# Patient Record
Sex: MALE | Race: BLACK OR AFRICAN AMERICAN | NOT HISPANIC OR LATINO | Employment: FULL TIME | URBAN - METROPOLITAN AREA
[De-identification: names, ages, dates, MRNs, and addresses within clinical notes are randomized per-mention and may not be internally consistent; named-entity substitution may affect disease eponyms.]

---

## 2017-07-18 RX ORDER — MONTELUKAST SODIUM 10 MG/1
10 TABLET ORAL NIGHTLY
Qty: 90 TABLET | Refills: 3 | Status: SHIPPED | OUTPATIENT
Start: 2017-07-18 | End: 2018-03-06

## 2017-07-18 NOTE — TELEPHONE ENCOUNTER
----- Message from Jeanette Nevarez sent at 7/18/2017  4:24 PM CDT -----  Would like a refill on sinus meds.pt uses.  [id number] - [place], [place] - [address]  [address]  [address] [facility] [address]  Phone: [phone] Fax: [phone]    Please call back at [phone]. thanks

## 2017-07-24 ENCOUNTER — TELEPHONE (OUTPATIENT)
Dept: INTERNAL MEDICINE | Facility: CLINIC | Age: 47
End: 2017-07-24

## 2017-07-24 NOTE — TELEPHONE ENCOUNTER
----- Message from Arron Martin sent at 7/24/2017  3:57 PM CDT -----  Contact: Pt  Pt wants to speak with nurse regarding  Antibiotic for his sinus infection. Please give pt a call at ..[phone] (mkil)

## 2017-07-25 ENCOUNTER — TELEPHONE (OUTPATIENT)
Dept: INTERNAL MEDICINE | Facility: CLINIC | Age: 47
End: 2017-07-25

## 2017-07-25 NOTE — TELEPHONE ENCOUNTER
----- Message from Damari Alex sent at 7/24/2017  4:21 PM CDT -----  Contact: pt  He's returning a missed call, please advise [phone] (home)

## 2017-07-31 ENCOUNTER — OFFICE VISIT (OUTPATIENT)
Dept: INTERNAL MEDICINE | Facility: CLINIC | Age: 47
End: 2017-07-31
Payer: COMMERCIAL

## 2017-07-31 VITALS
TEMPERATURE: 98 F | HEART RATE: 77 BPM | SYSTOLIC BLOOD PRESSURE: 146 MMHG | WEIGHT: 194 LBS | OXYGEN SATURATION: 97 % | BODY MASS INDEX: 25.6 KG/M2 | DIASTOLIC BLOOD PRESSURE: 97 MMHG

## 2017-07-31 DIAGNOSIS — R51.9 DAILY HEADACHE: Primary | ICD-10-CM

## 2017-07-31 DIAGNOSIS — R03.0 ELEVATED BLOOD PRESSURE READING: ICD-10-CM

## 2017-07-31 DIAGNOSIS — J30.9 CHRONIC ALLERGIC RHINITIS, UNSPECIFIED SEASONALITY, UNSPECIFIED TRIGGER: ICD-10-CM

## 2017-07-31 PROCEDURE — 99213 OFFICE O/P EST LOW 20 MIN: CPT | Mod: S$GLB,,, | Performed by: FAMILY MEDICINE

## 2017-07-31 PROCEDURE — 99999 PR PBB SHADOW E&M-EST. PATIENT-LVL III: CPT | Mod: PBBFAC,,, | Performed by: FAMILY MEDICINE

## 2017-07-31 RX ORDER — TRIAMCINOLONE ACETONIDE 55 UG/1
2 SPRAY, METERED NASAL DAILY
COMMUNITY
End: 2017-07-31 | Stop reason: SDUPTHER

## 2017-07-31 RX ORDER — METHYLPREDNISOLONE 4 MG/1
TABLET ORAL
Qty: 1 PACKAGE | Refills: 0 | Status: SHIPPED | OUTPATIENT
Start: 2017-07-31 | End: 2017-08-21

## 2017-07-31 RX ORDER — TRIAMCINOLONE ACETONIDE 55 UG/1
2 SPRAY, METERED NASAL DAILY
Qty: 16.9 G | Refills: 11 | Status: SHIPPED | OUTPATIENT
Start: 2017-07-31 | End: 2018-03-06 | Stop reason: ALTCHOICE

## 2017-07-31 NOTE — PROGRESS NOTES
"Subjective:       Patient ID: Alessandro Olivares Jr. is a 47 y.o. male.    Chief Complaint: congested; Headache; and Sinus Problem    Here with recurrent HA - starting up 10 days ago lasting 2-3 days, then off and on since then. Has mild HA now - thinks due to sinus. Using some OTC "sinus/HA pressure" med helps a little. Obtained off the shelf.  He had been on the nasacort and had stopped using it for 1-2 weeks. Restarted nasacort 10 days ago. He also was on singulair but stopped it 4 or more weeks ago.  States prior to 10 days ago having HAs 2 x week.    He gives hx of sensitivity to scents which will trigger HA. Has had HAs with N/V when very severe. Today his HA is mild.    His BP is elevated today - initial 146/97, my repeat 140/100. States BP always high with HAs - extremely high last August when he went to hospital for HA.      Headache    This is a recurrent problem. The current episode started 1 to 4 weeks ago. The problem occurs intermittently. The problem has been gradually improving. The pain is located in the bilateral, frontal and retro-orbital region. The quality of the pain is described as aching and stabbing. The pain is at a severity of 5/10 (today pain is 4-5/10 at worst 10 days ago it was "12/10"). Associated symptoms include coughing, nausea, phonophobia, photophobia, rhinorrhea and sinus pressure. Pertinent negatives include no blurred vision, ear pain or eye pain. Associated symptoms comments: Head congestion, rhinorrhea. His past medical history is significant for migraine headaches.   Sinus Problem   Associated symptoms include coughing, headaches and sinus pressure. Pertinent negatives include no ear pain.     Review of Systems   HENT: Positive for rhinorrhea and sinus pressure. Negative for ear pain.    Eyes: Positive for photophobia. Negative for blurred vision and pain.   Respiratory: Positive for cough.    Gastrointestinal: Positive for nausea.   Neurological: Positive for headaches.     "   Objective:      Physical Exam   Constitutional: He is oriented to person, place, and time. He appears well-developed and well-nourished.   HENT:   Head: Normocephalic and atraumatic.   Right Ear: Tympanic membrane, external ear and ear canal normal.   Left Ear: Tympanic membrane, external ear and ear canal normal.   Nose: Nose normal.   Mouth/Throat: Oropharynx is clear and moist. No oropharyngeal exudate.   Frontal and maxillary sinuses NTTP B   Eyes: Conjunctivae and EOM are normal.   Neck: Neck supple. No thyromegaly present.   Cardiovascular: Normal rate, regular rhythm and normal heart sounds.    Pulmonary/Chest: Effort normal and breath sounds normal.   Lymphadenopathy:     He has no cervical adenopathy.   Neurological: He is alert and oriented to person, place, and time.   Skin: Skin is warm and dry.   Psychiatric: He has a normal mood and affect. His behavior is normal.         Assessment/Plan:     1. Daily headache  methylPREDNISolone (MEDROL DOSEPACK) 4 mg tablet   2. Chronic allergic rhinitis, unspecified seasonality, unspecified trigger  methylPREDNISolone (MEDROL DOSEPACK) 4 mg tablet    triamcinolone (NASACORT) 55 mcg nasal inhaler   3. Elevated blood pressure reading      besides getting back on his nasal steroid and Singulair, he may restart propranolol if desired.  He took it for a couple months last year and stopped when the headaches resolved.  Is going to monitor his blood pressures.  Given a chart to record them and recheck in 4 weeks.

## 2017-08-07 ENCOUNTER — TELEPHONE (OUTPATIENT)
Dept: INTERNAL MEDICINE | Facility: CLINIC | Age: 47
End: 2017-08-07

## 2017-08-07 NOTE — TELEPHONE ENCOUNTER
----- Message from Gabriel García sent at 8/7/2017  2:48 PM CDT -----  Contact: pt  Pt is calling Nurse staff regarding pt blood type test results. Pt call back [phone] thanks

## 2017-08-07 NOTE — TELEPHONE ENCOUNTER
----- Message from Martine Matias sent at 8/7/2017  2:17 PM CDT -----  Contact: [phone]  Pt would like to consult with nurse about his blood type. Please call back at [phone]. Rd. [address]

## 2017-11-07 ENCOUNTER — OFFICE VISIT (OUTPATIENT)
Dept: INTERNAL MEDICINE | Facility: CLINIC | Age: 47
End: 2017-11-07
Payer: COMMERCIAL

## 2017-11-07 ENCOUNTER — APPOINTMENT (OUTPATIENT)
Dept: RADIOLOGY | Facility: HOSPITAL | Age: 47
End: 2017-11-07
Attending: FAMILY MEDICINE
Payer: COMMERCIAL

## 2017-11-07 VITALS
TEMPERATURE: 97 F | SYSTOLIC BLOOD PRESSURE: 134 MMHG | OXYGEN SATURATION: 98 % | BODY MASS INDEX: 25.61 KG/M2 | DIASTOLIC BLOOD PRESSURE: 92 MMHG | WEIGHT: 194.13 LBS | HEART RATE: 66 BPM

## 2017-11-07 DIAGNOSIS — M25.561 ACUTE PAIN OF RIGHT KNEE: Primary | ICD-10-CM

## 2017-11-07 DIAGNOSIS — M25.561 ACUTE PAIN OF RIGHT KNEE: ICD-10-CM

## 2017-11-07 PROCEDURE — 73562 X-RAY EXAM OF KNEE 3: CPT | Mod: 26,RT,, | Performed by: RADIOLOGY

## 2017-11-07 PROCEDURE — 73560 X-RAY EXAM OF KNEE 1 OR 2: CPT | Mod: 26,59,LT, | Performed by: RADIOLOGY

## 2017-11-07 PROCEDURE — 99999 PR PBB SHADOW E&M-EST. PATIENT-LVL III: CPT | Mod: PBBFAC,,, | Performed by: FAMILY MEDICINE

## 2017-11-07 PROCEDURE — 73560 X-RAY EXAM OF KNEE 1 OR 2: CPT | Mod: TC,PO,LT

## 2017-11-07 PROCEDURE — 99213 OFFICE O/P EST LOW 20 MIN: CPT | Mod: S$GLB,,, | Performed by: FAMILY MEDICINE

## 2017-11-07 NOTE — PROGRESS NOTES
Subjective:       Patient ID: Alessandro Olivares Jr. is a 47 y.o. male.    Chief Complaint: right knee pain    Several days ago he was getting up from a squatting position and felt a click in his right knee and subsequently developed pain and swelling.  The pain and  swelling progressed.  He reports  it is  improved with ice rest and ibuprofn.   He has a past history of clicking of the right  Knee. He has  not a previous swelling or pain.   He reports that his knee locked  with this recent episode but has not felt that in the past.      Review of Systems   Constitutional: Negative for chills and fever.   Musculoskeletal: Positive for arthralgias.        Right knee pain        Objective:      Physical Exam   Constitutional: He appears well-developed and well-nourished. No distress.   Musculoskeletal:   Right knee is warm and mildly swollen with no significant tenderness.  Full extension with no pain.  No rotational pain.  No click felt.       Office Visit on 11/22/2016   Component Date Value Ref Range Status    Sodium 11/22/2016 142  136 - 145 mmol/L Final    Potassium 11/22/2016 4.0  3.5 - 5.1 mmol/L Final    Chloride 11/22/2016 109  95 - 110 mmol/L Final    CO2 11/22/2016 20* 23 - 29 mmol/L Final    Glucose 11/22/2016 91  70 - 110 mg/dL Final    BUN, Bld 11/22/2016 19  6 - 20 mg/dL Final    Creatinine 11/22/2016 0.9  0.5 - 1.4 mg/dL Final    Calcium 11/22/2016 9.3  8.7 - 10.5 mg/dL Final    Anion Gap 11/22/2016 13  8 - 16 mmol/L Final    eGFR if African American 11/22/2016 >60.0  >60 mL/min/1.73 m^2 Final    eGFR if non African American 11/22/2016 >60.0  >60 mL/min/1.73 m^2 Final    WBC 11/22/2016 8.18  3.90 - 12.70 K/uL Final    RBC 11/22/2016 4.70  4.60 - 6.20 M/uL Final    Hemoglobin 11/22/2016 13.1* 14.0 - 18.0 g/dL Final    Hematocrit 11/22/2016 41.2  40.0 - 54.0 % Final    MCV 11/22/2016 88  82 - 98 fL Final    MCH 11/22/2016 27.9  27.0 - 31.0 pg Final    [facility] 31.8* 32.0 - 36.0 %  Final    RDW 11/22/2016 15.0* 11.5 - 14.5 % Final    Platelets 11/22/2016 257  150 - 350 K/uL Final    MPV 11/22/2016 10.7  9.2 - 12.9 fL Final    Gran # 11/22/2016 4.9  1.8 - 7.7 K/uL Final    Lymph # 11/22/2016 2.3  1.0 - 4.8 K/uL Final    Mono # 11/22/2016 0.7  0.3 - 1.0 K/uL Final    Eos # 11/22/2016 0.2  0.0 - 0.5 K/uL Final    Baso # 11/22/2016 0.03  0.00 - 0.20 K/uL Final    Gran% 11/22/2016 59.9  38.0 - 73.0 % Final    Lymph% 11/22/2016 28.1  18.0 - 48.0 % Final    Mono% 11/22/2016 8.9  4.0 - 15.0 % Final    Eosinophil% 11/22/2016 2.6  0.0 - 8.0 % Final    Basophil% 11/22/2016 0.4  0.0 - 1.9 % Final    Differential Method 11/22/2016 Automated   Final    Hemoglobin A1C 11/23/2016 5.7  4.5 - 6.2 % Final    Estimated Avg Glucose 11/23/2016 117  68 - 131 mg/dL Final     Assessment:       1. 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

## 2017-11-07 NOTE — LETTER
11/07/2017    To Whom It May Concern                [address]  [place] [address]  Phone: [phone]  Fax: [phone]   11/07/2017    Patient: Alessandro Olivares Jr.   YOB: 1970   Date of Visit: 11/7/2017       To Whom it May Concern:    Alessandro Olivares was seen in the clinic on 11/7/2017 by Dr Daniel Blackwood. He may return to work on 11/15/2017 depending on the reevaluation on 11/14/2017.    If you have any questions or concerns, please don't hesitate to call.    Sincerely,         Laina STEINBERG LPN

## 2017-12-26 ENCOUNTER — OFFICE VISIT (OUTPATIENT)
Dept: INTERNAL MEDICINE | Facility: CLINIC | Age: 47
End: 2017-12-26
Payer: COMMERCIAL

## 2017-12-26 VITALS
TEMPERATURE: 99 F | OXYGEN SATURATION: 99 % | DIASTOLIC BLOOD PRESSURE: 82 MMHG | WEIGHT: 195.31 LBS | BODY MASS INDEX: 25.88 KG/M2 | HEART RATE: 107 BPM | HEIGHT: 73 IN | SYSTOLIC BLOOD PRESSURE: 124 MMHG

## 2017-12-26 DIAGNOSIS — J00 CORYZA VIRUS: Primary | ICD-10-CM

## 2017-12-26 LAB
FLUAV AG SPEC QL IA: NEGATIVE
FLUBV AG SPEC QL IA: NEGATIVE
SPECIMEN SOURCE: NORMAL

## 2017-12-26 PROCEDURE — 87400 INFLUENZA A/B EACH AG IA: CPT | Mod: PO

## 2017-12-26 PROCEDURE — 99999 PR PBB SHADOW E&M-EST. PATIENT-LVL III: CPT | Mod: PBBFAC,,, | Performed by: PHYSICIAN ASSISTANT

## 2017-12-26 PROCEDURE — 99213 OFFICE O/P EST LOW 20 MIN: CPT | Mod: S$GLB,,, | Performed by: PHYSICIAN ASSISTANT

## 2017-12-26 NOTE — PATIENT INSTRUCTIONS
Understanding the Cold Virus  Colds are the most common illness that people get. Most adults get 2 or 3 colds per year, and most children get 5 to 7 colds per year. Colds may be caused by over 200 types of viruses. The most common of these are rhinoviruses (rhino refers to the nose).  What causes a cold virus?  All colds start with infection by a virus. You can be infected by more than one cold virus at a time. Infection with cold viruses happens when:  · You breathe in a virus from the air. This can happen when someone with a cold sneezes or coughs near you.  · You touch your eyes, nose, or mouth when your hand has a cold virus on it. This can happen if you touch an object that has the cold virus on it.  What are the symptoms of a cold virus?  Almost all colds involve a stuffy nose. Other common symptoms include:  · Runny nose  · Sneezing  · Sore throat  · Headache  · Cough  How is a cold treated?  Colds usually last 5 to 10 days. Treatment focuses on relieving symptoms. Treatments may include:  · Decongestant medicines. Several types of decongestants are available without prescription. These may help reduce stuffy or runny nose symptoms.  · Prescription or over-the-counter nasal sprays. These may help reduce nasal symptoms, including stuffiness.  · Prescription or over-the-counter pain medicines. These can help with headaches and sore throat.  · Self-care. This includes extra rest, using humidifiers, and drinking more fluids. These help you feel better while you are getting over a cold.  Antibiotics are not helpful for a cold. They do not make a cold shorter or relieve symptoms. Taking antibiotics when you dont need them can make them work less well when you need them for another illness.  Follow all directions for using medicines, especially when giving them to children. Contact your healthcare provider if you have any questions about using cold medicines safely.  Can a cold be prevented?  You can help  reduce the spread of cold viruses. This can help both you and others avoid getting colds. Follow these tips:  · Wash your hands well anytime you may have come into contact with cold viruses. Wash your hands for at least 20 seconds. When you cant wash with soap and water, use an alcohol-based hand .  · Dont touch your nose, eyes, or mouth, especially after touching something that may have a cold virus on it.  · Cover your mouth and nose when you cough or sneeze. Throw away tissues after using them.  · Disinfect things you touch often, such as phones and keyboards.    · Stay home when you have a cold.  What are the possible complications of a cold virus?  Colds usually go away by themselves. But its not unusual to get another type of infection while you have a cold. These can include:  · Sinus infection  · Lung infection, such as bronchitis or pneumonia  · Ear infection  If you have asthma or chronic bronchitis, a cold can make your condition worse.     When should I call my healthcare provider?  Call your healthcare provider right away if you have any of these:  · Fever of 100.4°F (38°C) or higher, or as directed  · Cough, chest pain, or shortness of breath that gets worse  · Symptoms dont get better or get worse after about 10 days  · Headache, sleepiness, or confusion that gets worse   Date Last Reviewed: 3/28/2016  © 2564-3814 [employer]. [address] [place] [address]. All rights reserved. This information is not intended as a substitute for professional medical care. Always follow your healthcare professional's instructions.        Adult Self-Care for Colds  Colds are caused by viruses. They can't be cured with antibiotics. However, you can ease symptoms and support your body's efforts to heal itself.  No matter which symptoms you have, be sure to:  · Drink plenty of fluids (water or clear soup)  · Stop smoking and drinking alcohol  · Get plenty of rest    Understand a  fever  · Take your temperature several times a day. If your fever is 100.4°F (38.0°C) for more than a day, call your healthcare provider.  · Relax, lie down. Go to bed if you want. Just get off your feet and rest. Also, drink plenty of fluids to avoid dehydration.  · Take acetaminophen or a nonsteroidal anti-inflammatory agent (NSAID), such as ibuprofen.  Treat a troubled nose kindly  · Breathe steam or heated humidified air to open blocked nasal passages.  a hot shower or use a vaporizer. Be careful not to get burned by the steam.  · Saline nasal sprays and decongestant tablets help open a stuffy nose. Antihistamines can also help, but they can cause side effects such as drowsiness and drying of the eyes, nose, and mouth.  Soothe a sore throat and cough  · Gargle every 2 hours with 1/4 teaspoon of salt dissolved in 1/2 cup of warm water. Suck on throat lozenges and cough drops to moisten your throat.  · Cough medicines are available but it is unclear how well they actually work.  · Take acetaminophen or an NSAID, such as ibuprofen, to ease throat pain  Ease digestive problems  · Put fluids back into your body. Take frequent sips of clear liquids such as water or broth. Avoid drinks that have a lot of sugar in them, such as juices and sodas. These can make diarrhea worse. Older children and adults can drink sports drinks.  · As your appetite returns, you can resume your normal diet. Ask your healthcare provider if there are any foods you should avoid.  When to seek medical care  When you first notice symptoms, ask your healthcare provider if antiviral medicines are appropriate. Antibiotics should not be taken for colds or flu. Also, call your healthcare provider if you have any of the following symptoms or if you aren't feeling better after 7 days:  · Shortness of breath  · Pain or pressure in the chest or belly (abdomen)  · Worsening symptoms, especially after a period of improvement  · Fever of 100.4°F   (38.0°C) or higher, or fever that doesn't go down with medicine  · Sudden dizziness or confusion  · Severe or continued vomiting  · Signs of dehydration, including extreme thirst, dark urine, infrequent urination, dry mouth  · Spotted, red, or very sore throat   Date Last Reviewed: 12/1/2016  © 2698-3485 [employer]. [address] [place] [address]. All rights reserved. This information is not intended as a substitute for professional medical care. Always follow your healthcare professional's instructions.        Viral Upper Respiratory Illness (Adult)  You have a viral upper respiratory illness (URI), which is another term for the common cold. This illness is contagious during the first few days. It is spread through the air by coughing and sneezing. It may also be spread by direct contact (touching the sick person and then touching your own eyes, nose, or mouth). Frequent handwashing will decrease risk of spread. Most viral illnesses go away within 7 to 10 days with rest and simple home remedies. Sometimes the illness may last for several weeks. Antibiotics will not kill a virus, and they are generally not prescribed for this condition.    Home care  · If symptoms are severe, rest at home for the first 2 to 3 days. When you resume activity, don't let yourself get too tired.  · Avoid being exposed to cigarette smoke (yours or others).  · You may use acetaminophen or ibuprofen to control pain and fever, unless another medicine was prescribed. (Note: If you have chronic liver or kidney disease, have ever had a stomach ulcer or gastrointestinal bleeding, or are taking blood-thinning medicines, talk with your healthcare provider before using these medicines.) Aspirin should never be given to anyone under 18 years of age who is ill with a viral infection or fever. It may cause severe liver or brain damage.  · Your appetite may be poor, so a light diet is fine. Avoid dehydration by drinking 6 to  8 glasses of fluids per day (water, soft drinks, juices, tea, or soup). Extra fluids will help loosen secretions in the nose and lungs.  · Over-the-counter cold medicines will not shorten the length of time youre sick, but they may be helpful for the following symptoms: cough, sore throat, and nasal and sinus congestion. (Note: Do not use decongestants if you have high blood pressure.)  Follow-up care  Follow up with your healthcare provider, or as advised.  When to seek medical advice  Call your healthcare provider right away if any of these occur:  · Cough with lots of colored sputum (mucus)  · Severe headache; face, neck, or ear pain  · Difficulty swallowing due to throat pain  · Fever of 100.4°F (38°C)  Call 911, or get immediate medical care  Call emergency services right away if any of these occur:  · Chest pain, shortness of breath, wheezing, or difficulty breathing  · Coughing up blood  · Inability to swallow due to throat pain  Date Last Reviewed: 9/13/2015 © 2000-2017 [employer]. [address] [place] [address]. All rights reserved. This information is not intended as a substitute for professional medical care. Always follow your healthcare professional's instructions.        The Flu (Influenza)     The virus that causes the flu spreads through the air in droplets when someone who has the flu coughs, sneezes, laughs, or talks.   The flu (influenza) is an infection that affects your respiratory tract. This tract is made up of your mouth, nose, and lungs, and the passages between them. Unlike a cold, the flu can make you very ill. And it can lead to pneumonia, a serious lung infection. The flu can have serious complications and even cause death.  Who is at risk for the flu?  Anyone can get the flu. But you are more likely to become infected if you:  · Have a weakened immune system  · Work in a healthcare setting where you may be exposed to flu germs  · Live or work with someone who  has the flu  · Havent had an annual flu shot  How does the flu spread?  The flu is caused by a virus. The virus spreads through the air in droplets when someone who has the flu coughs, sneezes, laughs, or talks. You can become infected when you inhale these viruses directly. You can also become infected when you touch a surface on which the droplets have landed and then transfer the germs to your eyes, nose, or mouth. Touching used tissues, or sharing utensils, drinking glasses, or a toothbrush from an infected person can expose you to flu viruses, too.  What are the symptoms of the flu?  Flu symptoms tend to come on quickly and may last a few days to a few weeks. They include:  · Fever usually higher than 100.4°F  (38°C) and chills  · Sore throat and headache  · Dry cough  · Runny nose  · Tiredness and weakness  · Muscle aches  Who is at risk for flu complications?  For some people, the flu can be very serious. The risk for complications is greater for:  · Children younger than age 5  · Adults ages 65 and older  · People with a chronic illness such as diabetes or heart, kidney, or lung disease  · People who live in a nursing home or long-term care facility   How is the flu treated?  The flu usually gets better after 7 days or so. In some cases, your healthcare provider may prescribe an antiviral medicine. This may help you get well a little sooner. For the medicine to help, you need to take it as soon as possible (ideally within 48 hours) after your symptoms start. If you develop pneumonia or other serious illness, you may need to stay in the hospital.  Easing flu symptoms  · Drink lots of fluids such as water, juice, and warm soup. A good rule is to drink enough so that you urinate your normal amount.  · Get plenty of rest.  · Ask your healthcare provider what to take for fever and pain.  · Call your provider if your fever is 100.4°F (38°C) or higher, or you become dizzy, lightheaded, or short of  breath.  Taking steps to protect others  · Wash your hands often, especially after coughing or sneezing. Or clean your hands with an alcohol-based hand  containing at least 60% alcohol.  · Cough or sneeze into a tissue. Then throw the tissue away and wash your hands. If you dont have a tissue, cough and sneeze into your elbow.  · Stay home until at least 24 hours after you no longer have a fever or chills. Be sure the fever isnt being hidden by fever-reducing medicine.  · Dont share food, utensils, drinking glasses, or a toothbrush with others.  · Ask your healthcare provider if others in your household should get antiviral medicine to help them avoid infection.  How can the flu be prevented?  · One of the best ways to avoid the flu is to get a flu vaccine each year. The virus that causes the flu changes from year to year. For that reason, healthcare providers recommend getting the flu vaccine each year, as soon as it's available in your area. The vaccine is given as a shot. Your healthcare provider can tell you which vaccine is right for you. A nasal spray is also available but is not recommended for the 3845-0266 flu season. The CDC says this is because the nasal spray did not seem to protect against the flu over the last several flu seasons. In the past, it was meant for people ages 2 to 49.  · Wash your hands often. Frequent handwashing is a proven way to help prevent infection.  · Carry an alcohol-based hand gel containing at least 60% alcohol. Use it when you can't use soap and water. Then wash your hands as soon as you can.  · Avoid touching your eyes, nose, and mouth.  · At home and work, clean phones, computer keyboards, and toys often with disinfectant wipes.  · If possible, avoid close contact with others who have the flu or symptoms of the flu.  Handwashing tips  Handwashing is one of the best ways to prevent many common infections. If you are caring for or visiting someone with the flu, wash  your hands each time you enter and leave the room. Follow these steps:  · Use warm water and plenty of soap. Rub your hands together well.  · Clean the whole hand, including under your nails, between your fingers, and up the wrists.  · Wash for at least 15 seconds.  · Rinse, letting the water run down your fingers, not up your wrists.  · Dry your hands well. Use a paper towel to turn off the faucet and open the door.  Using alcohol-based hand   Alcohol-based hand  are also a good choice. Use them when you can't use soap and water. Follow these steps:  · Squeeze about a tablespoon of gel into the palm of one hand.  · Rub your hands together briskly, cleaning the backs of your hands, the palms, between your fingers, and up the wrists.  · Rub until the gel is gone and your hands are completely dry.  Preventing the flu in healthcare settings  The flu is a special concern for people in hospitals and long-term care facilities. To help prevent the spread of flu, many hospitals and nursing homes take these steps:  · Healthcare providers wash their hands or use an alcohol-based hand  before and after treating each patient.  · People with the flu have private rooms and bathrooms or share a room with someone with the same infection.  · People who are at high risk for the flu but don't have it are encouraged to get the flu and pneumonia vaccines.  · All healthcare workers are encouraged or required to get flu shots.   Date Last Reviewed: 12/1/2016  © 4735-7713 [employer] [address] [place] [address], [place] [address]. All rights reserved. This information is not intended as a substitute for professional medical care. Always follow your healthcare professional's instructions.        Influenza (Adult)    Influenza is also called the flu. It is a viral illness that affects the air passages of your lungs. It is different from the common cold. The flu can easily be passed from one to person to  another. It may be spread through the air by coughing and sneezing. Or it can be spread by touching the sick person and then touching your own eyes, nose, or mouth.  The flu starts 1 to 3 days after you are exposed to the flu virus. It may last for 1 to 2 weeks but many people feel tired or fatigued for many weeks afterward. You usually dont need to take antibiotics unless you have a complication. This might be an ear or sinus infection or pneumonia.  Symptoms of the flu may be mild or severe. They can include extreme tiredness (wanting to stay in bed all day), chills, fevers, muscle aches, soreness with eye movement, headache, and a dry, hacking cough.  Home care  Follow these guidelines when caring for yourself at home:  · Avoid being around cigarette smoke, whether yours or other peoples.  · Acetaminophen or ibuprofen will help ease your fever, muscle aches, and headache. Dont give aspirin to anyone younger than 18 who has the flu. Aspirin can harm the liver.  · Nausea and loss of appetite are common with the flu. Eat light meals. Drink 6 to 8 glasses of liquids every day. Good choices are water, sport drinks, soft drinks without caffeine, juices, tea, and soup. Extra fluids will also help loosen secretions in your nose and lungs.  · Over-the-counter cold medicines will not make the flu go away faster. But the medicines may help with coughing, sore throat, and congestion in your nose and sinuses. Dont use a decongestant if you have high blood pressure.  · Stay home until your fever has been gone for at least 24 hours without using medicine to reduce fever.  Follow-up care  Follow up with your healthcare provider, or as advised, if you are not getting better over the next week.  If you are age 65 or older, talk with your provider about getting a pneumococcal vaccine every 5 years. You should also get this vaccine if you have chronic asthma or COPD. All adults should get a flu vaccine every fall. Ask your  provider about this.  When to seek medical advice  Call your healthcare provider right away if any of these occur:  · Cough with lots of colored mucus (sputum) or blood in your mucus  · Chest pain, shortness of breath, wheezing, or trouble breathing  · Severe headache, or face, neck, or ear pain  · New rash with fever  · Fever of 100.4°F (38°C) or higher, or as directed by your healthcare provider  · Confusion, behavior change, or seizure  · Severe weakness or dizziness  · You get a new fever or cough after getting better for a few days  Date Last Reviewed: 1/1/2017  © 1430-4338 [employer]. [address] [place] [address], [place] [address]. All rights reserved. This information is not intended as a substitute for professional medical care. Always follow your healthcare professional's instructions.

## 2017-12-26 NOTE — LETTER
December 26, 2017      [facility]  [address] [place] [address]  Phone: [phone]  Fax: [phone]       Patient: Alessandro Olivares   YOB: 1970  Date of Visit: 12/26/2017    To Whom It May Concern:    Tahir Olivares  was at [facility] on 12/26/2017. He may return to work/school on 12/28/2017 with no restrictions. If you have any questions or concerns, or if I can be of further assistance, please do not hesitate to contact me.    Sincerely,          Fallon Madrid PA-C

## 2017-12-26 NOTE — PROGRESS NOTES
"  Subjective:      Patient ID: Alessandro Olivares Jr. is a 47 y.o. male.    Chief Complaint: Fever; Sore Throat; Generalized Body Aches; and Cough    Fever    This is a new problem. Episode onset: 4 days. The problem has been gradually worsening. His temperature was unmeasured prior to arrival. Associated symptoms include abdominal pain (yesterday, has resolved), congestion, coughing (improved today), headaches, muscle aches, sleepiness and a sore throat. Pertinent negatives include no chest pain, diarrhea, ear pain, nausea, rash, urinary pain, vomiting or wheezing. He has tried NSAIDs for the symptoms. The treatment provided moderate relief.   Risk factors: sick contacts    Risk factors: no contaminated food, no contaminated water, no hx of cancer, no immunosuppression, no occupational exposure, no recent sickness and no recent travel        Review of Systems   Constitutional: Positive for activity change, appetite change, chills, diaphoresis, fatigue and fever. Negative for unexpected weight change.   HENT: Positive for congestion, postnasal drip, rhinorrhea and sore throat. Negative for ear pain.    Respiratory: Positive for cough (improved today). Negative for choking, chest tightness, shortness of breath, wheezing and stridor.    Cardiovascular: Negative for chest pain.   Gastrointestinal: Positive for abdominal pain (yesterday, has resolved). Negative for abdominal distention, anal bleeding, blood in stool, constipation, diarrhea, nausea, rectal pain and vomiting.   Genitourinary: Negative for dysuria.   Skin: Negative for rash.   Neurological: Positive for headaches.     Objective:   /82   Pulse 107   Temp 99.4 °F (37.4 °C) (Tympanic)   Ht 6' 1" (1.854 m)   Wt 88.6 kg (195 lb 5.2 oz)   SpO2 99%   BMI 25.77 kg/m²     Physical Exam   Constitutional: He is oriented to person, place, and time. He appears well-developed and well-nourished.   HENT:   Head: Normocephalic and atraumatic.   Right Ear: " Tympanic membrane, external ear and ear canal normal.   Left Ear: Tympanic membrane, external ear and ear canal normal.   Nose: Mucosal edema and rhinorrhea present. Right sinus exhibits no maxillary sinus tenderness and no frontal sinus tenderness. Left sinus exhibits no maxillary sinus tenderness and no frontal sinus tenderness.   Mouth/Throat: Uvula is midline and mucous membranes are normal. Posterior oropharyngeal erythema present. No oropharyngeal exudate. No tonsillar exudate.   Eyes: Conjunctivae and EOM are normal. Pupils are equal, round, and reactive to light.   Neck: Normal range of motion. Neck supple.   Cardiovascular: Normal rate, regular rhythm and normal heart sounds.  Exam reveals no gallop and no friction rub.    No murmur heard.  Pulmonary/Chest: Effort normal and breath sounds normal. No respiratory distress. He has no wheezes. He has no rales. He exhibits no tenderness.   Abdominal: Soft. He exhibits no distension. There is no tenderness.   Musculoskeletal: Normal range of motion.   Lymphadenopathy:     He has no cervical adenopathy.   Neurological: He is alert and oriented to person, place, and time.   Skin: Skin is warm and dry.   Psychiatric: He has a normal mood and affect. His behavior is normal. Judgment and thought content normal.   Vitals reviewed.      Assessment:     1. Coryza virus      Plan:   Coryza virus  -     Influenza antigen Nasopharyngeal Swab    -Educational handout on over-the-counter medications and at-home conservative care, pertinent to the patients diagnosis today, was handed to the patient and discussed in detail.  -alternate tylenol/motrin for fever and body aches. Increase fluids.     Return if symptoms worsen or fail to improve.

## 2018-03-06 ENCOUNTER — TELEPHONE (OUTPATIENT)
Dept: INTERNAL MEDICINE | Facility: CLINIC | Age: 48
End: 2018-03-06

## 2018-03-06 ENCOUNTER — OFFICE VISIT (OUTPATIENT)
Dept: INTERNAL MEDICINE | Facility: CLINIC | Age: 48
End: 2018-03-06
Payer: COMMERCIAL

## 2018-03-06 VITALS
WEIGHT: 200.5 LBS | OXYGEN SATURATION: 97 % | TEMPERATURE: 98 F | HEART RATE: 97 BPM | BODY MASS INDEX: 26.45 KG/M2 | DIASTOLIC BLOOD PRESSURE: 93 MMHG | SYSTOLIC BLOOD PRESSURE: 142 MMHG

## 2018-03-06 DIAGNOSIS — J33.9 NASAL POLYPS: Primary | ICD-10-CM

## 2018-03-06 DIAGNOSIS — J30.9 CHRONIC ALLERGIC RHINITIS, UNSPECIFIED SEASONALITY, UNSPECIFIED TRIGGER: ICD-10-CM

## 2018-03-06 PROBLEM — M25.561 ACUTE PAIN OF RIGHT KNEE: Status: RESOLVED | Noted: 2017-11-07 | Resolved: 2018-03-06

## 2018-03-06 PROCEDURE — 99999 PR PBB SHADOW E&M-EST. PATIENT-LVL III: CPT | Mod: PBBFAC,,, | Performed by: FAMILY MEDICINE

## 2018-03-06 PROCEDURE — 99213 OFFICE O/P EST LOW 20 MIN: CPT | Mod: S$GLB,,, | Performed by: FAMILY MEDICINE

## 2018-03-06 RX ORDER — MONTELUKAST SODIUM 10 MG/1
10 TABLET ORAL NIGHTLY
Qty: 30 TABLET | Refills: 0 | Status: SHIPPED | OUTPATIENT
Start: 2018-03-06 | End: 2018-08-11 | Stop reason: SDUPTHER

## 2018-03-06 RX ORDER — FLUTICASONE PROPIONATE 50 MCG
2 SPRAY, SUSPENSION (ML) NASAL DAILY
Qty: 1 BOTTLE | Refills: 5 | Status: SHIPPED | OUTPATIENT
Start: 2018-03-06 | End: 2020-11-05

## 2018-03-06 NOTE — TELEPHONE ENCOUNTER
----- Message from Jana Pandey sent at 3/6/2018  9:20 AM CST -----  Contact: Pt   Pt called and stated he needed to speak to the nurse. He stated that he needs a refill on two  medications that he takes for headaches due to sinus and allergies. He also stated that he does not know the names or dosage of the medicine.     [facility] [address] [place] [address] - [address] [place], [place] [address]E  [address] [place] [address]  [address] [facility] [address]  Phone: [phone] Fax: [phone]    He can be reached at [phone].  Thanks,  TF

## 2018-03-06 NOTE — TELEPHONE ENCOUNTER
Patient calling in regards to getting a refill on his medication for headaches. Patient was advised that there was singular in his medication list/ pt states that he needs a refill on a antibiotic that he had before. Patient was advised that Dr. Golden would need to see him in office to prescribe the antibiotic. Pt scheduled to see Dr. Reyes on today for 10:40 am. Pt verbalized understanding of the information given.

## 2018-03-06 NOTE — ASSESSMENT & PLAN NOTE
Advised correct techinque for nasal spray/flonase. Refill singular. RTC in 7 days for possible abx therapy if no improvement

## 2018-03-06 NOTE — PROGRESS NOTES
Subjective:       Patient ID: Alessandro Olivares Jr. is a 48 y.o. male.    Chief Complaint: Headache/sinus/allergies    Sinusitis   This is a new problem. The current episode started in the past 7 days. The problem has been waxing and waning since onset. There has been no fever. His pain is at a severity of 7/10. Associated symptoms include congestion and headaches. Pertinent negatives include no coughing, ear pain, shortness of breath, sneezing or sore throat. Past treatments include saline sprays and acetaminophen (nasacort). The treatment provided mild relief.     Denies high, persistent fevers >102°F; periorbital edema, inflammation, or erythema; cranial nerve palsies; abnormal extraocular movements; altered mental status; or meningeal signs.     Reports nausea on Sunday, all sx on right side with neck pain. Some blurred vision on Sunday    Review of Systems   Constitutional: Negative for fever.   HENT: Positive for congestion. Negative for ear pain, sneezing and sore throat.    Eyes: Positive for visual disturbance.   Respiratory: Negative for cough and shortness of breath.    Gastrointestinal: Positive for nausea.   Neurological: Positive for headaches.        Objective:   BP (!) 142/93   Pulse 97   Temp 98 °F (36.7 °C) (Tympanic)   Wt 90.9 kg (200 lb 8.1 oz)   SpO2 97%   BMI 26.45 kg/m²     Physical Exam   Constitutional: He is oriented to person, place, and time. He appears well-developed and well-nourished. No distress.   HENT:   Head: Normocephalic and atraumatic.   Right Ear: A middle ear effusion is present.   Left Ear: A middle ear effusion is present.   Nose: Mucosal edema (r>l) present. No rhinorrhea.   Mouth/Throat: Oropharynx is clear and moist. No oropharyngeal exudate, posterior oropharyngeal edema or posterior oropharyngeal erythema.   Eyes: EOM are normal. No scleral icterus.   Neck: Normal range of motion. Neck supple.   Cardiovascular: Normal rate, regular rhythm and normal heart sounds.     Pulmonary/Chest: Effort normal and breath sounds normal. He has no wheezes.   Abdominal: Soft. Bowel sounds are normal. There is no tenderness.   Musculoskeletal: Normal range of motion. He exhibits no edema or deformity.   Neurological: He is alert and oriented to person, place, and time.   Skin: Skin is warm and dry.   Psychiatric: He has a normal mood and affect.   Vitals reviewed.    Assessment:     1. Nasal polyps    2. Chronic allergic rhinitis, unspecified seasonality, unspecified trigger      Plan:     Problem List Items Addressed This Visit        ENT    Nasal polyps - Primary    Current Assessment & Plan     Was seen by ENT in past. If sx persist, consider return to ENT            Other    Chronic allergic rhinitis    Current Assessment & Plan     Advised correct techinque for nasal spray/flonase. Refill singular. RTC in 7 days for possible abx therapy if no improvement           Relevant Medications    fluticasone (FLONASE) 50 mcg/actuation nasal spray    montelukast (SINGULAIR) 10 mg tablet          Follow-up if symptoms worsen or fail to improve.

## 2018-08-11 DIAGNOSIS — J30.9 CHRONIC ALLERGIC RHINITIS: ICD-10-CM

## 2018-08-13 RX ORDER — MONTELUKAST SODIUM 10 MG/1
TABLET ORAL
Qty: 30 TABLET | Refills: 0 | Status: SHIPPED | OUTPATIENT
Start: 2018-08-13 | End: 2018-10-11 | Stop reason: SDUPTHER

## 2018-10-11 ENCOUNTER — OFFICE VISIT (OUTPATIENT)
Dept: INTERNAL MEDICINE | Facility: CLINIC | Age: 48
End: 2018-10-11
Payer: COMMERCIAL

## 2018-10-11 VITALS
TEMPERATURE: 98 F | OXYGEN SATURATION: 98 % | DIASTOLIC BLOOD PRESSURE: 89 MMHG | HEART RATE: 77 BPM | SYSTOLIC BLOOD PRESSURE: 126 MMHG | WEIGHT: 198.94 LBS | BODY MASS INDEX: 26.37 KG/M2 | HEIGHT: 73 IN

## 2018-10-11 DIAGNOSIS — J30.9 CHRONIC ALLERGIC RHINITIS: ICD-10-CM

## 2018-10-11 DIAGNOSIS — L02.416 ABSCESS OF LEG, LEFT: ICD-10-CM

## 2018-10-11 PROCEDURE — 99213 OFFICE O/P EST LOW 20 MIN: CPT | Mod: S$GLB,,, | Performed by: FAMILY MEDICINE

## 2018-10-11 PROCEDURE — 99999 PR PBB SHADOW E&M-EST. PATIENT-LVL III: CPT | Mod: PBBFAC,,, | Performed by: FAMILY MEDICINE

## 2018-10-11 PROCEDURE — 3008F BODY MASS INDEX DOCD: CPT | Mod: CPTII,S$GLB,, | Performed by: FAMILY MEDICINE

## 2018-10-11 RX ORDER — MONTELUKAST SODIUM 10 MG/1
TABLET ORAL
Qty: 30 TABLET | Refills: 0 | Status: SHIPPED | OUTPATIENT
Start: 2018-10-11 | End: 2018-10-22 | Stop reason: SDUPTHER

## 2018-10-11 RX ORDER — MONTELUKAST SODIUM 10 MG/1
TABLET ORAL
Qty: 30 TABLET | Refills: 0 | Status: CANCELLED | OUTPATIENT
Start: 2018-10-11

## 2018-10-11 NOTE — PATIENT INSTRUCTIONS
Abscess (Incision & Drainage)  An abscess is sometimes called a boil. It happens when bacteria get trapped under the skin and start to grow. Pus forms inside the abscess as the body responds to the bacteria. An abscess can happen with an insect bite, ingrown hair, blocked oil gland, pimple, cyst, or puncture wound.  Your healthcare provider has drained the pus from your abscess. If the abscess pocket was large, your healthcare provider may have put in gauze packing. Your provider will need to remove it on your next visit. He or she may also replace it at that time. You may not need antibiotics to treat a simple abscess, unless the infection is spreading into the skin around the wound (cellulitis).  The wound will take about 1 to 2 weeks to heal, depending on the size of the abscess. Healthy tissue will grow from the bottom and sides of the opening until it seals over.  Home care  These tips can help your wound heal:  · The wound may drain for the first 2 days. Cover the wound with a clean dry dressing. Change the dressing if it becomes soaked with blood or pus.  · If a gauze packing was placed inside the abscess pocket, you may be told to remove it yourself. You may do this in the shower. Once the packing is removed, you should wash the area in the shower, or clean the area as directed by your provider. Continue to do this until the skin opening has closed. Make sure you wash your hands after changing the packing or cleaning the wound.  · If you were prescribed antibiotics, take them as directed until they are all gone.  · You may use acetaminophen or ibuprofen to control pain, unless another pain medicine was prescribed. If you have liver disease or ever had a stomach ulcer, talk with your doctor before using these medicines.  Follow-up care  Follow up with your healthcare provider, or as advised. If a gauze packing was put in your wound, it should be removed in 1 to 2 days. Check your wound every day for any  signs that the infection is getting worse. The signs are listed below.  When to seek medical advice  Call your healthcare provider right away if any of these occur:  · Increasing redness or swelling  · Red streaks in the skin leading away from the wound  · Increasing local pain or swelling  · Continued pus draining from the wound 2 days after treatment  · Fever of 100.4ºF (38ºC) or higher, or as directed by your healthcare provider  · Boil returns when you are at home  Date Last Reviewed: 9/1/2016  © 3969-5939 [employer]. [address] [place] [address]. All rights reserved. This information is not intended as a substitute for professional medical care. Always follow your healthcare professional's instructions.        Fluticasone nasal spray  What is this medicine?  FLUTICASONE (floo TIK a sone) is a corticosteroid. This medicine is used to treat the symptoms of allergies like sneezing, itchy red eyes, and itchy, runny, or stuffy nose.  How should I use this medicine?  This medicine is for use in the nose. Follow the directions on your product or prescription label. This medicine works best if used at regular intervals. Do not use more often than directed. Make sure that you are using your nasal spray correctly. After 6 months of daily use without a prescription, talk to your doctor or health care professional before using it for a longer time. Ask your doctor or health care professional if you have any questions.  Talk to your pediatrician regarding the use of this medicine in children. Special care may be needed. This medicine has been used in children as young as 2 years. After two months of daily use without a prescription in a child, talk to your pediatrician before using it for a longer time.  What side effects may I notice from receiving this medicine?  Side effects that you should report to your doctor or health care professional as soon as possible:  · allergic reactions like skin  rash, itching or hives, swelling of the face, lips, or tongue  · changes in vision  · flu-like symptoms  · white patches or sores in the mouth or nose  Side effects that usually do not require medical attention (report to your doctor or health care professional if they continue or are bothersome):  · burning or irritation inside the nose or throat  · cough  · headache  · nosebleed  · unusual taste or smell  What may interact with this medicine?  · ketoconazole  · metyrapone  · some medicines for HIV  · vaccines  What if I miss a dose?  If you miss a dose, use it as soon as you remember. If it is almost time for your next dose, use only that dose and continue with your regular schedule. Do not use double or extra doses.  Where should I keep my medicine?  Keep out of the reach of children.  Store at room temperature between 15 and 30 degrees C (59 and 86 degrees F). Throw away any unused medicine after the expiration date.  What should I tell my health care provider before I take this medicine?  They need to know if you have any of these conditions:  · infection, like tuberculosis, herpes, or fungal infection  · recent surgery on nose or sinuses  · taking corticosteroid by mouth  · an unusual or allergic reaction to fluticasone, steroids, other medicines, foods, dyes, or preservatives  · pregnant or trying to get pregnant  · breast-feeding  What should I watch for while using this medicine?  Visit your doctor or health care professional for regular checks on your progress. Some symptoms may improve within 12 hours after starting use. Check with your doctor or health care professional if there is no improvement in your condition after 3 weeks of use.  Do not come in contact with people who have chickenpox or the measles while you are taking this medicine. If you do, call your doctor right away.  NOTE:This sheet is a summary. It may not cover all possible information. If you have questions about this medicine, talk to  your doctor, pharmacist, or health care provider. Copyright© 2017 [employer]

## 2018-10-11 NOTE — LETTER
October 11, 2018      [address]  [place] [address]  Phone: [phone]  Fax: [phone]       Patient: Alessandro Olivares   YOB: 1970  Date of Visit: 10/11/2018    To Whom It May Concern:    Tahir Olivares  was at [facility] on 10/11/2018. He may return to work on 10/15/2018 with no restrictions. If you have any questions or concerns, or if I can be of further assistance, please do not hesitate to contact me.    Sincerely,                Patrick Dyer MA

## 2018-10-11 NOTE — PROGRESS NOTES
"Subjective:       Patient ID: Alessandro Olivares Jr. is a 48 y.o. male.    Chief Complaint: Abscess (on the back of left leg for 1 week); Cough (since friday); and Sore Throat (since friday with fever)    HPI  Sore throat and cough since Friday and fever on Sunday.  States not using singular but using flonase. Coughing streaks of blood and blowing nose and blood. Tolerating po diet    Refused flu vaccine.    Abscess one week, draining. Less pain now that it has drained.   Review of Systems   Constitutional: Positive for fever.   HENT: Positive for congestion, nosebleeds and sore throat.    Eyes: Positive for discharge and itching.   Respiratory: Positive for cough.    Skin: Positive for wound.   Neurological: Positive for headaches (improved).        Objective:   /89 (BP Location: Left arm, Patient Position: Sitting, BP Method: Medium (Automatic))   Pulse 77   Temp 98.1 °F (36.7 °C) (Oral)   Ht 6' 1" (1.854 m)   Wt 90.2 kg (198 lb 15.4 oz)   SpO2 98%   BMI 26.25 kg/m²     Physical Exam   Constitutional: He is oriented to person, place, and time. He appears well-developed and well-nourished. No distress.   HENT:   Head: Normocephalic and atraumatic.   Right Ear: Tympanic membrane and ear canal normal.   Left Ear: A middle ear effusion is present.   Nose: Mucosal edema present.   Mouth/Throat: Oropharynx is clear and moist. No posterior oropharyngeal edema or posterior oropharyngeal erythema.   Eyes: EOM are normal. Left conjunctiva is injected. No scleral icterus.   Neck: Normal range of motion. Neck supple.   Cardiovascular: Normal rate, regular rhythm and normal heart sounds.   No murmur heard.  Pulmonary/Chest: Effort normal and breath sounds normal. He has no wheezes.   Abdominal: Soft. Bowel sounds are normal. There is no tenderness.   Musculoskeletal: Normal range of motion. He exhibits no edema or deformity.   Neurological: He is alert and oriented to person, place, and time.   Skin: Skin is warm " and dry.        Psychiatric: He has a normal mood and affect.   Vitals reviewed.    Assessment:     1. Abscess of leg, left    2. Chronic allergic rhinitis      Plan:     Problem List Items Addressed This Visit        ID    Abscess of leg, left    Current Assessment & Plan     Warm compresses. No abx at this time            Other    Chronic allergic rhinitis    Current Assessment & Plan     Cont singulair and flonase. Advised correct techinque for nasal spray/flonase                 Follow-up in about 1 week (around 10/18/2018).

## 2018-10-22 ENCOUNTER — OFFICE VISIT (OUTPATIENT)
Dept: INTERNAL MEDICINE | Facility: CLINIC | Age: 48
End: 2018-10-22
Payer: COMMERCIAL

## 2018-10-22 VITALS
WEIGHT: 196.13 LBS | HEART RATE: 68 BPM | SYSTOLIC BLOOD PRESSURE: 116 MMHG | TEMPERATURE: 97 F | BODY MASS INDEX: 25.99 KG/M2 | HEIGHT: 73 IN | OXYGEN SATURATION: 98 % | DIASTOLIC BLOOD PRESSURE: 84 MMHG

## 2018-10-22 DIAGNOSIS — J33.9 NASAL POLYPS: ICD-10-CM

## 2018-10-22 DIAGNOSIS — T14.8XXA MUSCLE STRAIN: ICD-10-CM

## 2018-10-22 DIAGNOSIS — J30.9 CHRONIC ALLERGIC RHINITIS: ICD-10-CM

## 2018-10-22 DIAGNOSIS — Z00.00 ANNUAL PHYSICAL EXAM: Primary | ICD-10-CM

## 2018-10-22 PROBLEM — L02.416 ABSCESS OF LEG, LEFT: Status: RESOLVED | Noted: 2018-10-11 | Resolved: 2018-10-22

## 2018-10-22 LAB
ALBUMIN SERPL BCP-MCNC: 3.9 G/DL
ALP SERPL-CCNC: 48 U/L
ALT SERPL W/O P-5'-P-CCNC: 10 U/L
ANION GAP SERPL CALC-SCNC: 11 MMOL/L
AST SERPL-CCNC: 15 U/L
BASOPHILS # BLD AUTO: 0.05 K/UL
BASOPHILS NFR BLD: 0.6 %
BILIRUB SERPL-MCNC: 0.4 MG/DL
BILIRUB UR QL STRIP: NEGATIVE
BUN SERPL-MCNC: 21 MG/DL
CALCIUM SERPL-MCNC: 9.7 MG/DL
CHLORIDE SERPL-SCNC: 106 MMOL/L
CHOLEST SERPL-MCNC: 194 MG/DL
CHOLEST/HDLC SERPL: 4.1 {RATIO}
CLARITY UR REFRACT.AUTO: CLEAR
CO2 SERPL-SCNC: 23 MMOL/L
COLOR UR AUTO: YELLOW
COMPLEXED PSA SERPL-MCNC: 0.44 NG/ML
CREAT SERPL-MCNC: 1 MG/DL
DIFFERENTIAL METHOD: ABNORMAL
EOSINOPHIL # BLD AUTO: 0.2 K/UL
EOSINOPHIL NFR BLD: 1.9 %
ERYTHROCYTE [DISTWIDTH] IN BLOOD BY AUTOMATED COUNT: 14.6 %
EST. GFR  (AFRICAN AMERICAN): >60 ML/MIN/1.73 M^2
EST. GFR  (NON AFRICAN AMERICAN): >60 ML/MIN/1.73 M^2
GLUCOSE SERPL-MCNC: 96 MG/DL
GLUCOSE UR QL STRIP: NEGATIVE
HCT VFR BLD AUTO: 39.2 %
HDLC SERPL-MCNC: 47 MG/DL
HDLC SERPL: 24.2 %
HGB BLD-MCNC: 12.9 G/DL
HGB UR QL STRIP: NEGATIVE
IMM GRANULOCYTES # BLD AUTO: 0.02 K/UL
IMM GRANULOCYTES NFR BLD AUTO: 0.3 %
KETONES UR QL STRIP: NEGATIVE
LDLC SERPL CALC-MCNC: 134.8 MG/DL
LEUKOCYTE ESTERASE UR QL STRIP: NEGATIVE
LYMPHOCYTES # BLD AUTO: 2.2 K/UL
LYMPHOCYTES NFR BLD: 28 %
MCH RBC QN AUTO: 29.2 PG
MCHC RBC AUTO-ENTMCNC: 32.9 G/DL
MCV RBC AUTO: 89 FL
MONOCYTES # BLD AUTO: 0.7 K/UL
MONOCYTES NFR BLD: 9.1 %
NEUTROPHILS # BLD AUTO: 4.7 K/UL
NEUTROPHILS NFR BLD: 60.1 %
NITRITE UR QL STRIP: NEGATIVE
NONHDLC SERPL-MCNC: 147 MG/DL
NRBC BLD-RTO: 0 /100 WBC
PH UR STRIP: 5 [PH] (ref 5–8)
PLATELET # BLD AUTO: 320 K/UL
PMV BLD AUTO: 10 FL
POTASSIUM SERPL-SCNC: 4.1 MMOL/L
PROT SERPL-MCNC: 7.5 G/DL
PROT UR QL STRIP: NEGATIVE
RBC # BLD AUTO: 4.42 M/UL
SODIUM SERPL-SCNC: 140 MMOL/L
SP GR UR STRIP: 1.02 (ref 1–1.03)
TRIGL SERPL-MCNC: 61 MG/DL
TSH SERPL DL<=0.005 MIU/L-ACNC: 1.11 UIU/ML
URN SPEC COLLECT METH UR: NORMAL
UROBILINOGEN UR STRIP-ACNC: NEGATIVE EU/DL
WBC # BLD AUTO: 7.78 K/UL

## 2018-10-22 PROCEDURE — 84153 ASSAY OF PSA TOTAL: CPT

## 2018-10-22 PROCEDURE — 84443 ASSAY THYROID STIM HORMONE: CPT

## 2018-10-22 PROCEDURE — 3008F BODY MASS INDEX DOCD: CPT | Mod: CPTII,S$GLB,, | Performed by: FAMILY MEDICINE

## 2018-10-22 PROCEDURE — 80061 LIPID PANEL: CPT

## 2018-10-22 PROCEDURE — 99214 OFFICE O/P EST MOD 30 MIN: CPT | Mod: S$GLB,,, | Performed by: FAMILY MEDICINE

## 2018-10-22 PROCEDURE — 85025 COMPLETE CBC W/AUTO DIFF WBC: CPT

## 2018-10-22 PROCEDURE — 80053 COMPREHEN METABOLIC PANEL: CPT

## 2018-10-22 PROCEDURE — 99999 PR PBB SHADOW E&M-EST. PATIENT-LVL III: CPT | Mod: PBBFAC,,, | Performed by: FAMILY MEDICINE

## 2018-10-22 PROCEDURE — 81003 URINALYSIS AUTO W/O SCOPE: CPT

## 2018-10-22 RX ORDER — MONTELUKAST SODIUM 10 MG/1
TABLET ORAL
Qty: 30 TABLET | Refills: 5 | Status: SHIPPED | OUTPATIENT
Start: 2018-10-22 | End: 2020-06-01

## 2018-10-22 NOTE — PROGRESS NOTES
Subjective:       Patient ID: Alessandro Olivares Jr. is a 48 y.o. male.    Chief Complaint: Annual Exam (pulled muscle in back on last night (upper))    Annual exam.  Family history of hypertension diabetes and coronary artery disease. Prescription medications are Singulair and Flonase for allergic rhinitis with nasal polyps.  He reports nasal congestion ear fullness and cough have improved with Flonase and Singulair.  He denies fever chills.  Denies any wheezing.  He has posterior upper back discomfort after lifting something yesterday.  Previous leg  infection has resolved.  He has no medication allergies.  He denies alcohol nicotine usage.      Review of Systems   Constitutional: Negative for activity change, appetite change, fatigue and unexpected weight change.   HENT: Positive for congestion. Negative for ear pain, hearing loss, sneezing, sore throat, tinnitus and trouble swallowing.    Eyes: Negative for pain, redness and visual disturbance.   Respiratory: Negative for cough, chest tightness, shortness of breath and wheezing.    Cardiovascular: Negative for chest pain, palpitations and leg swelling.   Gastrointestinal: Negative for abdominal distention, abdominal pain, blood in stool, constipation, diarrhea, nausea, rectal pain and vomiting.   Endocrine: Negative for polydipsia and polyuria.   Genitourinary: Negative for difficulty urinating, dysuria, frequency, hematuria and urgency.   Musculoskeletal: Negative for arthralgias, back pain, joint swelling, myalgias, neck pain and neck stiffness.   Skin: Negative for rash and wound.   Neurological: Negative for dizziness, tremors, syncope, light-headedness, numbness and headaches.   Hematological: Negative for adenopathy. Does not bruise/bleed easily.   Psychiatric/Behavioral: Negative for agitation, confusion, dysphoric mood and sleep disturbance. The patient is not nervous/anxious.        Objective:      Physical Exam   Constitutional: He is oriented to  person, place, and time. He appears well-developed and well-nourished.   HENT:   Right Ear: External ear normal.   Left Ear: External ear normal.   Mouth/Throat: Oropharynx is clear and moist.   Boggy  mucosa with polyps present   Eyes: Conjunctivae and EOM are normal. Pupils are equal, round, and reactive to light.   Neck: Normal range of motion. Neck supple. No JVD present. No thyromegaly present.   Cardiovascular: Normal rate, regular rhythm, normal heart sounds and intact distal pulses. Exam reveals no gallop and no friction rub.   No murmur heard.  Pulmonary/Chest: Effort normal and breath sounds normal. No respiratory distress. He has no wheezes. He has no rales.   Abdominal: Soft. Bowel sounds are normal. He exhibits no distension and no mass. There is no tenderness.   Musculoskeletal: Normal range of motion. He exhibits tenderness. He exhibits no edema.   Mild tenderness in the left scapular region with no deformity   Lymphadenopathy:     He has no cervical adenopathy.   Neurological: He is alert and oriented to person, place, and time. He has normal reflexes. He displays normal reflexes. No cranial nerve deficit. He exhibits normal muscle tone. Coordination normal.   Skin: Skin is warm and dry. No rash noted.   Psychiatric: He has a normal mood and affect. His behavior is normal. Judgment and thought content normal.       Office Visit on 12/26/2017   Component Date Value Ref Range Status    Influenza A Ag, EIA 12/26/2017 Negative  Negative Final    Influenza B Ag, EIA 12/26/2017 Negative  Negative Final    Flu A & B Source 12/26/2017 Nasopharyngeal Swab   Final     Assessment:       1. Annual physical exam    2. Chronic allergic rhinitis        Plan:   Continue Singulair and Flonase for nasal polyps.  Avoid aspirin.  Follow-up in 3 months if symptoms not resolved.  CBC urinalysis CMP lipids TSH PSA was ordered.  He Tylenol for upper back strain.  Call if not resolved.  Declined flu  immunization.      Annual physical exam  -     CBC auto differential  -     Urinalysis  -     Comprehensive metabolic panel  -     Lipid panel  -     TSH  -     PSA, Screening    Chronic allergic rhinitis  -     montelukast (SINGULAIR) 10 mg tablet; TAKE 1 TABLET(10 MG) BY MOUTH EVERY EVENING  Dispense: 30 tablet; Refill: 5

## 2018-10-23 DIAGNOSIS — D64.9 ANEMIA, UNSPECIFIED TYPE: Primary | ICD-10-CM

## 2018-10-24 ENCOUNTER — TELEPHONE (OUTPATIENT)
Dept: INTERNAL MEDICINE | Facility: CLINIC | Age: 48
End: 2018-10-24

## 2018-10-24 NOTE — TELEPHONE ENCOUNTER
----- Message from Markus Crawford sent at 10/24/2018  4:52 PM CDT -----  Contact: pt   Pt returning call. hospitals call back.         ..[phone] (home)

## 2018-10-24 NOTE — TELEPHONE ENCOUNTER
Returned the patient phone call. Informed of his test results. Patient verbally understood the information given.

## 2019-01-21 PROBLEM — Z00.00 ANNUAL PHYSICAL EXAM: Status: RESOLVED | Noted: 2018-10-22 | Resolved: 2019-01-21

## 2019-02-05 DIAGNOSIS — J30.9 CHRONIC ALLERGIC RHINITIS: ICD-10-CM

## 2019-02-06 RX ORDER — MONTELUKAST SODIUM 10 MG/1
TABLET ORAL
Qty: 30 TABLET | Refills: 0 | Status: SHIPPED | OUTPATIENT
Start: 2019-02-06 | End: 2020-06-01

## 2019-12-16 ENCOUNTER — OFFICE VISIT (OUTPATIENT)
Dept: INTERNAL MEDICINE | Facility: CLINIC | Age: 49
End: 2019-12-16
Payer: COMMERCIAL

## 2019-12-16 VITALS
HEART RATE: 70 BPM | DIASTOLIC BLOOD PRESSURE: 70 MMHG | OXYGEN SATURATION: 98 % | WEIGHT: 200.63 LBS | SYSTOLIC BLOOD PRESSURE: 130 MMHG | HEIGHT: 73 IN | BODY MASS INDEX: 26.59 KG/M2 | TEMPERATURE: 99 F

## 2019-12-16 DIAGNOSIS — J33.9 NASAL POLYPS: Primary | ICD-10-CM

## 2019-12-16 PROCEDURE — 3008F PR BODY MASS INDEX (BMI) DOCUMENTED: ICD-10-PCS | Mod: CPTII,S$GLB,, | Performed by: FAMILY MEDICINE

## 2019-12-16 PROCEDURE — 99999 PR PBB SHADOW E&M-EST. PATIENT-LVL III: CPT | Mod: PBBFAC,,, | Performed by: FAMILY MEDICINE

## 2019-12-16 PROCEDURE — 3008F BODY MASS INDEX DOCD: CPT | Mod: CPTII,S$GLB,, | Performed by: FAMILY MEDICINE

## 2019-12-16 PROCEDURE — 99213 OFFICE O/P EST LOW 20 MIN: CPT | Mod: S$GLB,,, | Performed by: FAMILY MEDICINE

## 2019-12-16 PROCEDURE — 99999 PR PBB SHADOW E&M-EST. PATIENT-LVL III: ICD-10-PCS | Mod: PBBFAC,,, | Performed by: FAMILY MEDICINE

## 2019-12-16 PROCEDURE — 99213 PR OFFICE/OUTPT VISIT, EST, LEVL III, 20-29 MIN: ICD-10-PCS | Mod: S$GLB,,, | Performed by: FAMILY MEDICINE

## 2019-12-16 NOTE — PROGRESS NOTES
Subjective:       Patient ID: Alessandro Olivares Jr. is a 49 y.o. male.    Chief Complaint: Nasal Congestion and Headache    Chronic nasal congestion with associated headaches.  He reports he has tried everything over-the-counter with no relief.  He denies fever chills.  He denies ear pressure or fullness.    Review of Systems   Constitutional: Negative for chills and fever.   HENT: Positive for congestion and sinus pressure.    Respiratory: Negative for cough and wheezing.    Cardiovascular: Negative for chest pain and palpitations.   Neurological: Positive for headaches. Negative for dizziness and light-headedness.       Objective:      Physical Exam   Constitutional: He appears well-developed and well-nourished. No distress.   HENT:   Right Ear: External ear normal.   Left Ear: External ear normal.   Mouth/Throat: Oropharynx is clear and moist.   Bilateral moderate-sized nasal polyps   Cardiovascular: Normal rate and regular rhythm.   No murmur heard.  Pulmonary/Chest: Effort normal and breath sounds normal.   Lymphadenopathy:     He has no cervical adenopathy.       Office Visit on 10/22/2018   Component Date Value Ref Range Status    WBC 10/22/2018 7.78  3.90 - 12.70 K/uL Final    RBC 10/22/2018 4.42* 4.60 - 6.20 M/uL Final    Hemoglobin 10/22/2018 12.9* 14.0 - 18.0 g/dL Final    Hematocrit 10/22/2018 39.2* 40.0 - 54.0 % Final    Mean Corpuscular Volume 10/22/2018 89  82 - 98 fL Final    Mean Corpuscular Hemoglobin 10/22/2018 29.2  27.0 - 31.0 pg Final    Mean Corpuscular Hemoglobin Conc 10/22/2018 32.9  32.0 - 36.0 g/dL Final    RDW 10/22/2018 14.6* 11.5 - 14.5 % Final    Platelets 10/22/2018 320  150 - 350 K/uL Final    MPV 10/22/2018 10.0  9.2 - 12.9 fL Final    Immature Granulocytes 10/22/2018 0.3  0.0 - 0.5 % Final    Gran # (ANC) 10/22/2018 4.7  1.8 - 7.7 K/uL Final    Immature Grans (Abs) 10/22/2018 0.02  0.00 - 0.04 K/uL Final    Lymph # 10/22/2018 2.2  1.0 - 4.8 K/uL Final    Mono #  10/22/2018 0.7  0.3 - 1.0 K/uL Final    Eos # 10/22/2018 0.2  0.0 - 0.5 K/uL Final    Baso # 10/22/2018 0.05  0.00 - 0.20 K/uL Final    nRBC 10/22/2018 0  0 /100 WBC Final    Gran% 10/22/2018 60.1  38.0 - 73.0 % Final    Lymph% 10/22/2018 28.0  18.0 - 48.0 % Final    Mono% 10/22/2018 9.1  4.0 - 15.0 % Final    Eosinophil% 10/22/2018 1.9  0.0 - 8.0 % Final    Basophil% 10/22/2018 0.6  0.0 - 1.9 % Final    Differential Method 10/22/2018 Automated   Final    Specimen UA 10/22/2018 Urine, Clean Catch   Final    Color, UA 10/22/2018 Yellow  Yellow, Straw, Audrey Final    Appearance, UA 10/22/2018 Clear  Clear Final    pH, UA 10/22/2018 5.0  5.0 - 8.0 Final    Specific Gravity, UA 10/22/2018 1.020  1.005 - 1.030 Final    Protein, UA 10/22/2018 Negative  Negative Final    Glucose, UA 10/22/2018 Negative  Negative Final    Ketones, UA 10/22/2018 Negative  Negative Final    Bilirubin (UA) 10/22/2018 Negative  Negative Final    Occult Blood UA 10/22/2018 Negative  Negative Final    Nitrite, UA 10/22/2018 Negative  Negative Final    Urobilinogen, UA 10/22/2018 Negative  <2.0 EU/dL Final    Leukocytes, UA 10/22/2018 Negative  Negative Final    Sodium 10/22/2018 140  136 - 145 mmol/L Final    Potassium 10/22/2018 4.1  3.5 - 5.1 mmol/L Final    Chloride 10/22/2018 106  95 - 110 mmol/L Final    CO2 10/22/2018 23  23 - 29 mmol/L Final    Glucose 10/22/2018 96  70 - 110 mg/dL Final    BUN, Bld 10/22/2018 21* 6 - 20 mg/dL Final    Creatinine 10/22/2018 1.0  0.5 - 1.4 mg/dL Final    Calcium 10/22/2018 9.7  8.7 - 10.5 mg/dL Final    Total Protein 10/22/2018 7.5  6.0 - 8.4 g/dL Final    Albumin 10/22/2018 3.9  3.5 - 5.2 g/dL Final    Total Bilirubin 10/22/2018 0.4  0.1 - 1.0 mg/dL Final    Alkaline Phosphatase 10/22/2018 48* 55 - 135 U/L Final    AST 10/22/2018 15  10 - 40 U/L Final    ALT 10/22/2018 10  10 - 44 U/L Final    Anion Gap 10/22/2018 11  8 - 16 mmol/L Final    eGFR if   10/22/2018 >60.0  >60 mL/min/1.73 m^2 Final    eGFR if non African American 10/22/2018 >60.0  >60 mL/min/1.73 m^2 Final    Cholesterol 10/22/2018 194  120 - 199 mg/dL Final    Triglycerides 10/22/2018 61  30 - 150 mg/dL Final    HDL 10/22/2018 47  40 - 75 mg/dL Final    LDL Cholesterol 10/22/2018 134.8  63.0 - 159.0 mg/dL Final    Hdl/Cholesterol Ratio 10/22/2018 24.2  20.0 - 50.0 % Final    Total Cholesterol/HDL Ratio 10/22/2018 4.1  2.0 - 5.0 Final    Non-HDL Cholesterol 10/22/2018 147  mg/dL Final    TSH 10/22/2018 1.108  0.400 - 4.000 uIU/mL Final    PSA, SCREEN 10/22/2018 0.44  0.00 - 4.00 ng/mL Final     Assessment:       1. Nasal polyps        Plan:     ENT referral    Nasal polyps  -     Ambulatory referral to ENT

## 2020-01-06 ENCOUNTER — TELEPHONE (OUTPATIENT)
Dept: NEUROLOGY | Facility: CLINIC | Age: 50
End: 2020-01-06

## 2020-01-06 ENCOUNTER — OFFICE VISIT (OUTPATIENT)
Dept: OTOLARYNGOLOGY | Facility: CLINIC | Age: 50
End: 2020-01-06
Payer: COMMERCIAL

## 2020-01-06 VITALS
TEMPERATURE: 97 F | SYSTOLIC BLOOD PRESSURE: 130 MMHG | WEIGHT: 197.56 LBS | HEART RATE: 66 BPM | BODY MASS INDEX: 26.06 KG/M2 | DIASTOLIC BLOOD PRESSURE: 81 MMHG

## 2020-01-06 DIAGNOSIS — R51.9 WORSENING HEADACHES: Primary | ICD-10-CM

## 2020-01-06 DIAGNOSIS — J30.89 NON-SEASONAL ALLERGIC RHINITIS, UNSPECIFIED TRIGGER: ICD-10-CM

## 2020-01-06 PROCEDURE — 99999 PR PBB SHADOW E&M-EST. PATIENT-LVL III: ICD-10-PCS | Mod: PBBFAC,,, | Performed by: ORTHOPAEDIC SURGERY

## 2020-01-06 PROCEDURE — 99203 PR OFFICE/OUTPT VISIT, NEW, LEVL III, 30-44 MIN: ICD-10-PCS | Mod: S$GLB,,, | Performed by: ORTHOPAEDIC SURGERY

## 2020-01-06 PROCEDURE — 99999 PR PBB SHADOW E&M-EST. PATIENT-LVL III: CPT | Mod: PBBFAC,,, | Performed by: ORTHOPAEDIC SURGERY

## 2020-01-06 PROCEDURE — 3008F PR BODY MASS INDEX (BMI) DOCUMENTED: ICD-10-PCS | Mod: CPTII,S$GLB,, | Performed by: ORTHOPAEDIC SURGERY

## 2020-01-06 PROCEDURE — 3008F BODY MASS INDEX DOCD: CPT | Mod: CPTII,S$GLB,, | Performed by: ORTHOPAEDIC SURGERY

## 2020-01-06 PROCEDURE — 99203 OFFICE O/P NEW LOW 30 MIN: CPT | Mod: S$GLB,,, | Performed by: ORTHOPAEDIC SURGERY

## 2020-01-06 NOTE — TELEPHONE ENCOUNTER
----- Message from Nikolai Blank LPN sent at 1/6/2020 12:28 PM CST -----  Hi,     Dr. Lewis with ENT has put referral in for patient to see neurology. I was unable to schedule. Would someone for your office please give patient a call to schedule. Thanks.

## 2020-01-06 NOTE — PROGRESS NOTES
Subjective:       Patient ID: Alessandro Olivares Jr. is a 49 y.o. male.    Chief Complaint: Other (Saw Dr. Blackwood about 3 weeks ago and was Dx with nasal polyps)    Patient is a very pleasant 49 year old gentleman here to see me today for evaluation of possible nasal polyps.  He saw his PCP recently, and there was concern for nasal polyposis.  Since that visit, he has been using different natural remedies including tea tree oil as well as cayenne pepper on his food and he has noted improvement in his symptoms. His nasal congestion and obstruction have improved.  He has only required Flonase and Singulair once over the last three weeks.  He was also having daily headaches at that time.  I saw him in 2016 with similar complaints, but no nasal polyps were seen at that time.  He does not smoke, and has no history of asthma.  His main concern today is his headaches.  He has been taking Excedrin migraine for the headache, last dose was yesterday.  He does have light and sound sensitivity when his headaches are most severe, with some nausea.    Review of Systems   Constitutional: Negative for fatigue, fever and unexpected weight change.   HENT: Positive for congestion. Negative for ear discharge, ear pain, facial swelling, hearing loss, nosebleeds, postnasal drip, rhinorrhea, sinus pressure, sneezing, sore throat, tinnitus, trouble swallowing and voice change.    Eyes: Negative for discharge, redness and itching.   Respiratory: Negative for cough, choking, shortness of breath and wheezing.    Cardiovascular: Negative for chest pain and palpitations.   Gastrointestinal: Negative for abdominal pain.        No reflux.   Musculoskeletal: Negative for neck pain.   Allergic/Immunologic: Positive for environmental allergies.   Neurological: Negative for dizziness, facial asymmetry, light-headedness and headaches.   Hematological: Negative for adenopathy. Does not bruise/bleed easily.   Psychiatric/Behavioral: Negative for  agitation, behavioral problems, confusion and decreased concentration.       Objective:      Physical Exam   Constitutional: He is oriented to person, place, and time. Vital signs are normal. He appears well-developed and well-nourished. No distress.   HENT:   Head: Normocephalic and atraumatic.   Right Ear: Hearing, tympanic membrane, external ear and ear canal normal.   Left Ear: Hearing, tympanic membrane, external ear and ear canal normal.   Nose: Nose normal. No mucosal edema, rhinorrhea, nasal deformity or septal deviation.   Mouth/Throat: Uvula is midline, oropharynx is clear and moist and mucous membranes are normal. No trismus in the jaw. Normal dentition. No uvula swelling. No oropharyngeal exudate or posterior oropharyngeal edema.   Patient with clearly visible middle turbinates, no polyps seen   Eyes: Pupils are equal, round, and reactive to light. Conjunctivae and EOM are normal. Right eye exhibits no chemosis. Left eye exhibits no chemosis. Right conjunctiva is not injected. Left conjunctiva is not injected. No scleral icterus.   Neck: Trachea normal and phonation normal. No tracheal tenderness present. No tracheal deviation present. No thyroid mass and no thyromegaly present.   Cardiovascular: Intact distal pulses.   Pulmonary/Chest: Effort normal. No accessory muscle usage or stridor. No respiratory distress.   Lymphadenopathy:        Head (right side): No submental, no submandibular, no preauricular and no posterior auricular adenopathy present.        Head (left side): No submental, no submandibular, no preauricular and no posterior auricular adenopathy present.     He has no cervical adenopathy.        Right cervical: No superficial cervical and no deep cervical adenopathy present.       Left cervical: No superficial cervical and no deep cervical adenopathy present.   Neurological: He is alert and oriented to person, place, and time. No cranial nerve deficit.   Skin: Skin is warm and dry. No rash  noted. No erythema.   Psychiatric: He has a normal mood and affect. His behavior is normal. Thought content normal.       Assessment:       1. Worsening headaches    2. Non-seasonal allergic rhinitis, unspecified trigger        Plan:       1.  Worsening headaches:  Discussed that his headaches may be more consistent with migraine (sensitivity to light and sound, frequency of headache, nausea, etc.).   Neurology referral placed, encouraged patient to keep a headache journal.  2.  AR:  Flonase and singulair as needed.  If those medications are no longer controlling his symptoms he should call and will then schedule him for allergy testing if needed.  Reassured patient that he does not have any visible polyps today, his middle turbinates are clearly visible.  RTC as needed.

## 2020-01-06 NOTE — LETTER
January 6, 2020      Daniel Blackwood MD  [address] [place] [address] [address] [place] [id number]           [facility]  [address] [place] [address]  [address] [place] [address]  Phone: [phone]  Fax: [phone]          Patient: Alessandro Olivares Jr.   MR Number: [id number]   YOB: 1970   Date of Visit: 1/6/2020       Dear Dr. Daniel Blackwood:    Thank you for referring Alessandro Olivares to me for evaluation. Attached you will find relevant portions of my assessment and plan of care.    If you have questions, please do not hesitate to call me. I look forward to following Alessandro Olivares along with you.    Sincerely,    Rosa Lewis MD    Enclosure  CC:  No Recipients    If you would like to receive this communication electronically, please contact [email or web address] or [phone] to request more information on [employer] access.    For providers and/or their staff who would like to refer a patient to [facility], please contact us through our one-stop-shop provider referral line, [facility] , at [phone].    If you feel you have received this communication in error or would no longer like to receive these types of communications, please e-mail [email or web address]

## 2020-03-26 ENCOUNTER — TELEPHONE (OUTPATIENT)
Dept: NEUROLOGY | Facility: CLINIC | Age: 50
End: 2020-03-26

## 2020-06-01 ENCOUNTER — OFFICE VISIT (OUTPATIENT)
Dept: INTERNAL MEDICINE | Facility: CLINIC | Age: 50
End: 2020-06-01
Payer: COMMERCIAL

## 2020-06-01 VITALS
HEIGHT: 73 IN | WEIGHT: 195.13 LBS | SYSTOLIC BLOOD PRESSURE: 126 MMHG | OXYGEN SATURATION: 98 % | HEART RATE: 78 BPM | TEMPERATURE: 98 F | BODY MASS INDEX: 25.86 KG/M2 | DIASTOLIC BLOOD PRESSURE: 90 MMHG

## 2020-06-01 DIAGNOSIS — G43.909 MIGRAINE WITHOUT STATUS MIGRAINOSUS, NOT INTRACTABLE, UNSPECIFIED MIGRAINE TYPE: ICD-10-CM

## 2020-06-01 DIAGNOSIS — J30.2 SEASONAL ALLERGIES: ICD-10-CM

## 2020-06-01 DIAGNOSIS — G44.52 NEW PERSISTENT DAILY HEADACHE: Primary | ICD-10-CM

## 2020-06-01 PROCEDURE — 99999 PR PBB SHADOW E&M-EST. PATIENT-LVL IV: CPT | Mod: PBBFAC,,, | Performed by: FAMILY MEDICINE

## 2020-06-01 PROCEDURE — 3008F BODY MASS INDEX DOCD: CPT | Mod: CPTII,S$GLB,, | Performed by: FAMILY MEDICINE

## 2020-06-01 PROCEDURE — 99213 PR OFFICE/OUTPT VISIT, EST, LEVL III, 20-29 MIN: ICD-10-PCS | Mod: S$GLB,,, | Performed by: FAMILY MEDICINE

## 2020-06-01 PROCEDURE — 99213 OFFICE O/P EST LOW 20 MIN: CPT | Mod: S$GLB,,, | Performed by: FAMILY MEDICINE

## 2020-06-01 PROCEDURE — 3008F PR BODY MASS INDEX (BMI) DOCUMENTED: ICD-10-PCS | Mod: CPTII,S$GLB,, | Performed by: FAMILY MEDICINE

## 2020-06-01 PROCEDURE — 99999 PR PBB SHADOW E&M-EST. PATIENT-LVL IV: ICD-10-PCS | Mod: PBBFAC,,, | Performed by: FAMILY MEDICINE

## 2020-06-01 RX ORDER — METHYLPREDNISOLONE 4 MG/1
TABLET ORAL
Qty: 1 PACKAGE | Refills: 0 | Status: SHIPPED | OUTPATIENT
Start: 2020-06-01 | End: 2020-08-17

## 2020-06-01 RX ORDER — PROPRANOLOL HYDROCHLORIDE 60 MG/1
60 CAPSULE, EXTENDED RELEASE ORAL DAILY
Qty: 30 CAPSULE | Refills: 2 | Status: SHIPPED | OUTPATIENT
Start: 2020-06-01 | End: 2020-11-05

## 2020-06-01 NOTE — PROGRESS NOTES
Subjective:       Patient ID: Alessandro Olivares Jr. is a 50 y.o. male.    Chief Complaint: Sinusitis    Here with c/o HA dialy for a week . It will be in different places.  Used sinus med last night. [place] fine this AM but then HA started - currently right frontal 5/10. It has been L teporal, mid frontal.    Headache    This is a new problem. The current episode started in the past 7 days. The pain is located in the frontal, temporal, occipital and parietal (either side) region. Associated symptoms include back pain, drainage, nausea, phonophobia and photophobia. Pertinent negatives include no coughing, fever, sore throat or vomiting. Associated symptoms comments: Sinus congestion. The symptoms are aggravated by bright light, emotional stress and hunger. He has tried NSAIDs, cold packs, darkened room and Excedrin for the symptoms. The treatment provided mild relief. His past medical history is significant for migraines in the family (mother had daily HAs for a while).     Review of Systems   Constitutional: Negative for fever.   HENT: Negative for sore throat.    Eyes: Positive for photophobia.   Respiratory: Negative for cough.    Gastrointestinal: Positive for nausea. Negative for vomiting.   Musculoskeletal: Positive for back pain.   Neurological: Positive for headaches.       Objective:      Physical Exam   Constitutional: He is oriented to person, place, and time. He appears well-developed and well-nourished.   HENT:   Head: Normocephalic and atraumatic.   Right Ear: Tympanic membrane, external ear and ear canal normal.   Left Ear: Tympanic membrane, external ear and ear canal normal.   Nose: Nose normal.   Mouth/Throat: Oropharynx is clear and moist. No oropharyngeal exudate.   Eyes: Conjunctivae and EOM are normal.   Neck: Neck supple. No thyromegaly present.   Cardiovascular: Normal rate, regular rhythm and normal heart sounds.   Pulmonary/Chest: Effort normal and breath sounds normal.   Lymphadenopathy:      He has no cervical adenopathy.   Neurological: He is alert and oriented to person, place, and time.   Skin: Skin is warm and dry.   Psychiatric: He has a normal mood and affect. His behavior is normal.         Assessment/Plan:     1. New persistent daily headache  propranoloL (INDERAL LA) 60 MG 24 hr capsule    methylPREDNISolone (MEDROL DOSEPACK) 4 mg tablet   2. Migraine without status migrainosus, not intractable, unspecified migraine type  propranoloL (INDERAL LA) 60 MG 24 hr capsule   3. Seasonal allergies  methylPREDNISolone (MEDROL DOSEPACK) 4 mg tablet     Prophylactic migraine medicine started.  Recheck 8 weeks.

## 2020-06-01 NOTE — PATIENT INSTRUCTIONS
"Schedule visit with neurologist please for further evaluation of your headaches.  Schedule recheck with me for 8 weeks.  Hold the flonase for now.  OK to take Excedrin Migraine Brand or generic equivalent - but more than 3 times a month may contribute to increased number of headaches.      Migraine Headache: Stages and Treatment    A migraine headache tends to progress in stages. Learning these stages can help you better understand what is happening. Then you can learn ways to reduce pain and relieve other symptoms. Methods for relieving your symptoms include self-care and medicines.  Migraine stages  Migraines tend to progress through 4 stages. Many people don't have all stages, and stages may differ with each headache:  · Prodrome. A few hours to a day or so before the headache, you may feel tired, (yawning many times), uneasy, or collins. You may also feel bloated or crave certain foods.  · Aura. Up to an hour before the headache starts, some migraine sufferers experience aura--flashing lights, blind spots, other vision problems, confusion, difficulty speaking, or other neurologic symptoms.  · Headache. Moderate to severe pain affects one side of the head and then can spread to both sides, often along with nausea. You may be highly sensitive to light, sound, and odors. Vomiting or diarrhea may also happen. This stage lasts 4 to 72 hours.  · Postdrome. After your headache ends, you may feel tired, achy, and "washed out." This may last for a day or so.  Self-care during a migraine  Here is what you can do:  · Use a cold compress. Wrap a thin cloth around a cold pack, a cold can of soda, or a bag of frozen vegetables. Apply this to your temple or other pain site.  · Drink fluids. If nausea makes it hard to drink, try sucking on ice.  · Rest. If possible, lie down. Try not to bend over, as this may increase your pain. Sometimes laying in a dark quiet room can help the migraine from being aggravated.    · Try caffeine. " Some people find that drinking fluids with caffeine, such as coffee or tea, helps to lessen migraine pain.  Using medicines  Work with your healthcare provider to find the right medicines for you. Medicines for migraine may relieve pain (analgesics), relieve nausea, or attack the migraine's root causes (migraine-specific medicines).  Rebound headache  Taking analgesics each day, or even several times a week, may lead to more frequent and severe headaches. These are called rebound headaches. If you think you're having rebound headaches, tell your healthcare provider. He or she can help you safely decrease your medicine. Rebound caffeine withdrawal headaches can also happen.    Date Last Reviewed: 10/9/2015  © 6979-9106 [employer]. [address] [place] [address], [place] [address]. All rights reserved. This information is not intended as a substitute for professional medical care. Always follow your healthcare professional's instructions.        Preventing Migraine Headaches: Medicines and Lifestyle Changes     Going to bed and getting up at the same time each day, including weekends, may help prevent migraines.     A migraine is a type of severe headache. Having a migraine can be very painful. But there are steps you can take to help prevent migraines.  Medicines to help prevent migraines  · Your healthcare provider may prescribe certain medicines to help prevent migraines. These medicines may need to be taken daily. Or they may only need to be taken at times when youre likely to have a migraine.  · Common medicines used to help prevent migraines include:  ¨ Triptans (serotonin receptor agonists)  ¨ Nonsteroidal anti-inflammatory drugs (available over-the-counter)  ¨ Beta-blockers  ¨ Anticonvulsants  ¨ Tricyclic antidepressants  ¨ Calcium channel blockers  ¨ Certain vitamins, minerals, and plant extracts  ¨ Botulinum toxin injection (Botox) for certain chronic migraines   ¨ CGRP (calcitonin gene-related  peptide) agnonists are being reviewed by the Food and Drug Administration (FDA)  Lifestyle changes for long-term prevention  Here are some suggestions:  · Exercise. Regular exercise can help prevent migraines and improve your health. (If exercise triggers your migraines, talk to your healthcare provider.)  · Keep regular habits. Dont skip or delay meals. Drink plenty of water. And go to bed and get up at about the same time each day. This includes weekends.  · Try alternative treatments. These are treatments that do not involve the use of medicines or surgery. They may help relieve symptoms and prevent migraines. Some treatment options include biofeedback and acupuncture. Ask your healthcare provider to tell you more about these treatments if you have questions.  · Limit caffeine. You may find that caffeine helps relieve pain during an attack. But too much caffeine can also trigger migraines. So, limit the amount of caffeine you consume.  Date Last Reviewed: 10/11/2015  © 7144-9838 [employer], "[employer]. [address] [place] [address], [place] [address]. All rights reserved. This information is not intended as a substitute for professional medical care. Always follow your healthcare professional's instructions.

## 2020-08-17 ENCOUNTER — LAB VISIT (OUTPATIENT)
Dept: LAB | Facility: HOSPITAL | Age: 50
End: 2020-08-17
Attending: FAMILY MEDICINE
Payer: COMMERCIAL

## 2020-08-17 ENCOUNTER — OFFICE VISIT (OUTPATIENT)
Dept: INTERNAL MEDICINE | Facility: CLINIC | Age: 50
End: 2020-08-17
Payer: COMMERCIAL

## 2020-08-17 VITALS
OXYGEN SATURATION: 98 % | TEMPERATURE: 98 F | SYSTOLIC BLOOD PRESSURE: 172 MMHG | BODY MASS INDEX: 25.93 KG/M2 | HEIGHT: 73 IN | RESPIRATION RATE: 18 BRPM | DIASTOLIC BLOOD PRESSURE: 94 MMHG | WEIGHT: 195.69 LBS | HEART RATE: 67 BPM

## 2020-08-17 DIAGNOSIS — I10 ESSENTIAL HYPERTENSION: ICD-10-CM

## 2020-08-17 DIAGNOSIS — Z13.220 ENCOUNTER FOR LIPID SCREENING FOR CARDIOVASCULAR DISEASE: ICD-10-CM

## 2020-08-17 DIAGNOSIS — Z13.6 ENCOUNTER FOR LIPID SCREENING FOR CARDIOVASCULAR DISEASE: ICD-10-CM

## 2020-08-17 DIAGNOSIS — Z11.59 NEED FOR HEPATITIS C SCREENING TEST: ICD-10-CM

## 2020-08-17 DIAGNOSIS — Z11.4 ENCOUNTER FOR SCREENING FOR HIV: ICD-10-CM

## 2020-08-17 DIAGNOSIS — I10 ESSENTIAL HYPERTENSION: Primary | ICD-10-CM

## 2020-08-17 PROCEDURE — 99999 PR PBB SHADOW E&M-EST. PATIENT-LVL III: CPT | Mod: PBBFAC,,, | Performed by: FAMILY MEDICINE

## 2020-08-17 PROCEDURE — 86803 HEPATITIS C AB TEST: CPT

## 2020-08-17 PROCEDURE — 86703 HIV-1/HIV-2 1 RESULT ANTBDY: CPT

## 2020-08-17 PROCEDURE — 99214 PR OFFICE/OUTPT VISIT, EST, LEVL IV, 30-39 MIN: ICD-10-PCS | Mod: S$GLB,,, | Performed by: FAMILY MEDICINE

## 2020-08-17 PROCEDURE — 85025 COMPLETE CBC W/AUTO DIFF WBC: CPT

## 2020-08-17 PROCEDURE — 80053 COMPREHEN METABOLIC PANEL: CPT

## 2020-08-17 PROCEDURE — 3008F BODY MASS INDEX DOCD: CPT | Mod: CPTII,S$GLB,, | Performed by: FAMILY MEDICINE

## 2020-08-17 PROCEDURE — 80061 LIPID PANEL: CPT

## 2020-08-17 PROCEDURE — 99214 OFFICE O/P EST MOD 30 MIN: CPT | Mod: S$GLB,,, | Performed by: FAMILY MEDICINE

## 2020-08-17 PROCEDURE — 3008F PR BODY MASS INDEX (BMI) DOCUMENTED: ICD-10-PCS | Mod: CPTII,S$GLB,, | Performed by: FAMILY MEDICINE

## 2020-08-17 PROCEDURE — 36415 COLL VENOUS BLD VENIPUNCTURE: CPT

## 2020-08-17 PROCEDURE — 99999 PR PBB SHADOW E&M-EST. PATIENT-LVL III: ICD-10-PCS | Mod: PBBFAC,,, | Performed by: FAMILY MEDICINE

## 2020-08-17 RX ORDER — INDAPAMIDE 1.25 MG/1
2.5 TABLET ORAL DAILY
Qty: 60 TABLET | Refills: 0 | Status: SHIPPED | OUTPATIENT
Start: 2020-08-17 | End: 2020-09-14 | Stop reason: ALTCHOICE

## 2020-08-17 NOTE — ASSESSMENT & PLAN NOTE
Elevated during work physical. fmh htn, maternal   Denies sx  Will initiate diuretic  Advised to take at night

## 2020-08-17 NOTE — PATIENT INSTRUCTIONS
Naproxen and naproxen sodium oral immediate-release tablets  What is this medicine?  NAPROXEN (na PROX en) is a non-steroidal anti-inflammatory drug (NSAID). It is used to reduce swelling and to treat pain. This medicine may be used for dental pain, headache, or painful monthly periods. It is also used for painful joint and muscular problems such as arthritis, tendinitis, bursitis, and gout.  How should I use this medicine?  Take this medicine by mouth with a glass of water. Follow the directions on the prescription label. Take it with food if your stomach gets upset. Try to not lie down for at least 10 minutes after you take it. Take your medicine at regular intervals. Do not take your medicine more often than directed. Long-term, continuous use may increase the risk of heart attack or stroke.  A special MedGuide will be given to you by the pharmacist with each prescription and refill. Be sure to read this information carefully each time.  Talk to your pediatrician regarding the use of this medicine in children. Special care may be needed.  What side effects may I notice from receiving this medicine?  Side effects that you should report to your doctor or health care professional as soon as possible:  · black or bloody stools, blood in the urine or vomit  · blurred vision  · chest pain  · difficulty breathing or wheezing  · nausea or vomiting  · severe stomach pain  · skin rash, skin redness, blistering or peeling skin, hives, or itching  · slurred speech or weakness on one side of the body  · swelling of eyelids, throat, lips  · unexplained weight gain or swelling  · unusually weak or tired  · yellowing of eyes or skin  Side effects that usually do not require medical attention (report to your doctor or health care professional if they continue or are bothersome):  · constipation  · headache  · heartburn  What may interact with this  medicine?  · alcohol  · aspirin  · cidofovir  · diuretics  · lithium  · methotrexate  · other drugs for inflammation like ketorolac or prednisone  · pemetrexed  · probenecid  · warfarin  What if I miss a dose?  If you miss a dose, take it as soon as you can. If it is almost time for your next dose, take only that dose. Do not take double or extra doses.  Where should I keep my medicine?  Keep out of the reach of children.  Store at room temperature between 15 and 30 degrees C (59 and 86 degrees F). Keep container tightly closed. Throw away any unused medicine after the expiration date.  What should I tell my health care provider before I take this medicine?  They need to know if you have any of these conditions:  · asthma  · cigarette smoker  · drink more than 3 alcohol containing drinks a day  · heart disease or circulation problems such as heart failure or leg edema (fluid retention)  · high blood pressure  · kidney disease  · liver disease  · stomach bleeding or ulcers  · an unusual or allergic reaction to naproxen, aspirin, other NSAIDs, other medicines, foods, dyes, or preservatives  · pregnant or trying to get pregnant  · breast-feeding  What should I watch for while using this medicine?  Tell your doctor or health care professional if your pain does not get better. Talk to your doctor before taking another medicine for pain. Do not treat yourself.  This medicine does not prevent heart attack or stroke. In fact, this medicine may increase the chance of a heart attack or stroke. The chance may increase with longer use of this medicine and in people who have heart disease. If you take aspirin to prevent heart attack or stroke, talk with your doctor or health care professional.  Do not take other medicines that contain aspirin, ibuprofen, or naproxen with this medicine. Side effects such as stomach upset, nausea, or ulcers may be more likely to occur. Many medicines available without a prescription should not be  taken with this medicine.  This medicine can cause ulcers and bleeding in the stomach and intestines at any time during treatment. Do not smoke cigarettes or drink alcohol. These increase irritation to your stomach and can make it more susceptible to damage from this medicine. Ulcers and bleeding can happen without warning symptoms and can cause death.  You may get drowsy or dizzy. Do not drive, use machinery, or do anything that needs mental alertness until you know how this medicine affects you. Do not stand or sit up quickly, especially if you are an older patient. This reduces the risk of dizzy or fainting spells.  This medicine can cause you to bleed more easily. Try to avoid damage to your teeth and gums when you brush or floss your teeth.  NOTE:This sheet is a summary. It may not cover all possible information. If you have questions about this medicine, talk to your doctor, pharmacist, or health care provider. Copyright© 2017 [employer]        Indapamide tablets  What is this medicine?  INDAPAMIDE (in DAP a mide) is a diuretic. It helps you make more urine and to lose salt and excess water from your body. This medicine is used to treat high blood pressure. It is also used to treat salt and water retention from heart failure.  How should I use this medicine?  Take this medicine by mouth with a glass of water. Follow the directions on the prescription label. Do not take your medicine more often than directed. Remember that you will need to pass more urine after taking this medicine. Do not take your medicine at a time of day that will cause you problems. Do not take at bedtime. Take your medicine at regular intervals. Do not take your medicine more often than directed. Do not stop taking except on your doctor's advice.  Talk to your pediatrician regarding the use of this medicine in children. Special care may be needed.  What side effects may I notice from receiving this medicine?  Side effects that you  should report to your doctor or health care professional as soon as possible:  · allergic reactions like skin rash, itching or hives, swelling of the face, lips, or tongue  · anxious or restless  · blurred vision  · dry mouth  · infection or flu like symptoms  · irregular heartbeat  · muscle cramps or spasm  · redness, blistering, peeling or loosening of the skin, including inside the mouth  · unusually weak or tired  · vomiting  Side effects that usually do not require medical attention (report to your doctor or health care professional if they continue or are bothersome):  · diarrhea  · headache  · loss of appetite  · nausea, stomach upset  · pain  What may interact with this medicine?  · heart medicines like digoxin  · lithium  · other medicines for high blood pressure  What if I miss a dose?  If you miss a dose, take it as soon as you can. If it is almost time for your next dose, take only that dose. Do not take double or extra doses.  Where should I keep my medicine?  Keep out of the reach of children.  Store at room temperature between 15 and 30 degrees C (59 and 86 degrees F). Keep container tightly closed. Throw away any unused medicine after the expiration date.  What should I tell my health care provider before I take this medicine?  They need to know if you have any of these conditions:  · diabetes  · gout  · kidney disease  · history of irregular heart beats  · liver disease  · pancreatitis  · small amount of urine or difficulty passing urine  · systemic lupus erythematosus  · an unusual or allergic reaction to indapamide, sulfa drugs, other medicines, foods, dyes, or preservatives  · pregnant or trying to get pregnant  · breast-feeding  What should I watch for while using this medicine?  Visit your doctor or health care professional for regular checks on your progress. Check your blood pressure as directed. Ask your doctor or health care professional what your blood pressure should be and when you  should contact him or her.  You may need to be on a special diet while taking this medicine. Ask your doctor.  Check with your doctor or health care professional if you get an attack of severe diarrhea, nausea and vomiting, or if you sweat a lot. The loss of too much body fluid can make it dangerous for you to take this medicine.  You may get drowsy or dizzy. Do not drive, use machinery, or do anything that needs mental alertness until you know how this medicine affects you. Do not stand or sit up quickly, especially if you are an older patient. This reduces the risk of dizzy or fainting spells. Alcohol may interfere with the effect of this medicine. Avoid alcoholic drinks.  This medicine may affect your blood sugar level. If you have diabetes, check with your doctor or health care professional before changing the dose of your diabetic medicine.  This medicine can make you more sensitive to the sun. Keep out of the sun. If you cannot avoid being in the sun, wear protective clothing and use sunscreen. Do not use sun lamps or tanning beds/booths.  NOTE:This sheet is a summary. It may not cover all possible information. If you have questions about this medicine, talk to your doctor, pharmacist, or health care provider. Copyright© 2017 [employer]        Low-Salt Choices  Eating salt (sodium) can make your body retain too much water. Excess water makes your heart work harder. Canned, packaged, and frozen foods are easy to prepare, but they are often high in sodium. Here are some ideas for low-salt foods you can easily prepare yourself.    For breakfast  · Fruit or 100% fruit juice  · Whole-wheat bread or an English muffin. Compare sodium content on labels.  · Low-fat milk or yogurt  · Unsalted eggs  · Shredded wheat  · Corn tortillas  · Unsalted steamed rice  · Regular (not instant) hot cereal, made without salt  Stay away from:  · Sausage, torres, and ham  · Flour tortillas  · Packaged muffins, pancakes, and  biscuits  · Instant hot cereals  · Cottage cheese  For lunch and dinner  · Fresh fish, chicken, turkey, or meat--baked, broiled, or roasted without salt  · Dry beans, cooked without salt  · Tofu, stir-fried without salt  · Unsalted fresh fruit and vegetables, or frozen or canned fruit and vegetables with no added salt  Stay away from:  · Lunch or deli meat that is cured or smoked  · Cheese  · Tomato juice and catsup  · Canned vegetables, soups, and fish not labeled as no-salt-added or reduced sodium  · Packaged gravies and sauces  · Olives, pickles, and relish  · Bottled salad dressings  For snacks and desserts  · Yogurt  · Unsalted, air popped popcorn  · Unsalted nuts or seeds  Stay away from:  · Pies and cakes  · Packaged dessert mixes  · Pizza  · Canned and packaged puddings  · Pretzels, chips, crackers, and nuts--unless the label says unsalted  Date Last Reviewed: 6/17/2015 © 2000-2017 [employer]. [address] [place] [address]. All rights reserved. This information is not intended as a substitute for professional medical care. Always follow your healthcare professional's instructions.        Step-by-Step  Checking Your Blood Pressure    Date Last Reviewed: 4/27/2016 © 2000-2017 [employer]. [address] [place] [address]. All rights reserved. This information is not intended as a substitute for professional medical care. Always follow your healthcare professional's instructions.

## 2020-08-17 NOTE — PROGRESS NOTES
"Subjective:       Patient ID: Alessandro Olivares Jr. is a 50 y.o. male.    Chief Complaint: Follow-up (b/p f/u)    HPI  Here for bp f/u  Elevated at last visit  Not taking anything  Physical at work and high  fmh htn  Maternal passed last year from CVA  No meds other than bb  Reports eating healthy and exercising    Reports aw DAILEY  Was suppose to see neuro for concerns migraine  Unable to get appt  Was started on propanolol  With relief of sx  Less frequent HA  Completed steroid dose pack  Does endorse taking nsaids for HA    Was previously seen by ent  Considering allergy testing  Review of Systems   Constitutional: Negative for fever.   Respiratory: Negative for cough and shortness of breath.    Cardiovascular: Negative for chest pain and leg swelling.   Gastrointestinal: Negative for diarrhea.   Neurological: Positive for headaches. Negative for facial asymmetry, speech difficulty and weakness.        Objective:   BP (!) 172/94 (BP Location: Right arm, Patient Position: Sitting, BP Method: Medium (Manual))   Pulse 67   Temp 98.4 °F (36.9 °C) (Tympanic)   Resp 18   Ht 6' 1" (1.854 m)   Wt 88.7 kg (195 lb 10.5 oz)   SpO2 98%   BMI 25.81 kg/m²     BP Readings from Last 3 Encounters:   08/17/20 (!) 172/94   06/01/20 (!) 126/90   01/06/20 130/81       Lab Results   Component Value Date    HGBA1C 5.7 11/22/2016       Physical Exam  Vitals signs reviewed.   Constitutional:       General: He is not in acute distress.     Appearance: He is well-developed.   HENT:      Head: Normocephalic and atraumatic.   Neck:      Musculoskeletal: Normal range of motion.   Cardiovascular:      Rate and Rhythm: Normal rate.   Pulmonary:      Effort: Pulmonary effort is normal. No respiratory distress.   Abdominal:      Palpations: Abdomen is soft.   Musculoskeletal: Normal range of motion.   Skin:     General: Skin is warm and dry.   Neurological:      Mental Status: He is alert and oriented to person, place, and time. "   Psychiatric:         Behavior: Behavior normal.       Assessment:     1. Essential hypertension    2. Encounter for lipid screening for cardiovascular disease    3. Encounter for screening for HIV    4. Need for hepatitis C screening test      Plan:     Problem List Items Addressed This Visit        Cardiac/Vascular    Essential hypertension - Primary    Current Assessment & Plan     Elevated during work physical. fmh htn, maternal   Denies sx  Will initiate diuretic  Advised to take at night         Relevant Medications    indapamide (LOZOL) 1.25 MG Tab    Other Relevant Orders    Comprehensive metabolic panel    CBC auto differential      Other Visit Diagnoses     Encounter for lipid screening for cardiovascular disease        Relevant Orders    Lipid Panel    Encounter for screening for HIV        Relevant Orders    HIV 1/2 Ag/Ab (4th Gen)    Need for hepatitis C screening test        Relevant Orders    Hepatitis C Antibody        Called in rx  Spoke to azael to confirm receipt   Received electronic transmission error when sending rx to pharm  Follow up in about 4 weeks (around 9/14/2020) for Follow Up, Hypertension, nonfasting.

## 2020-08-18 LAB
ALBUMIN SERPL BCP-MCNC: 4.2 G/DL (ref 3.5–5.2)
ALP SERPL-CCNC: 48 U/L (ref 55–135)
ALT SERPL W/O P-5'-P-CCNC: 11 U/L (ref 10–44)
ANION GAP SERPL CALC-SCNC: 10 MMOL/L (ref 8–16)
AST SERPL-CCNC: 18 U/L (ref 10–40)
BASOPHILS # BLD AUTO: 0.03 K/UL (ref 0–0.2)
BASOPHILS NFR BLD: 0.4 % (ref 0–1.9)
BILIRUB SERPL-MCNC: 0.4 MG/DL (ref 0.1–1)
BUN SERPL-MCNC: 16 MG/DL (ref 6–20)
CALCIUM SERPL-MCNC: 9.4 MG/DL (ref 8.7–10.5)
CHLORIDE SERPL-SCNC: 107 MMOL/L (ref 95–110)
CHOLEST SERPL-MCNC: 219 MG/DL (ref 120–199)
CHOLEST/HDLC SERPL: 4.4 {RATIO} (ref 2–5)
CO2 SERPL-SCNC: 27 MMOL/L (ref 23–29)
CREAT SERPL-MCNC: 1.2 MG/DL (ref 0.5–1.4)
DIFFERENTIAL METHOD: ABNORMAL
EOSINOPHIL # BLD AUTO: 0.2 K/UL (ref 0–0.5)
EOSINOPHIL NFR BLD: 2.5 % (ref 0–8)
ERYTHROCYTE [DISTWIDTH] IN BLOOD BY AUTOMATED COUNT: 15.9 % (ref 11.5–14.5)
EST. GFR  (AFRICAN AMERICAN): >60 ML/MIN/1.73 M^2
EST. GFR  (NON AFRICAN AMERICAN): >60 ML/MIN/1.73 M^2
GLUCOSE SERPL-MCNC: 74 MG/DL (ref 70–110)
HCT VFR BLD AUTO: 43.6 % (ref 40–54)
HCV AB SERPL QL IA: NEGATIVE
HDLC SERPL-MCNC: 50 MG/DL (ref 40–75)
HDLC SERPL: 22.8 % (ref 20–50)
HGB BLD-MCNC: 13.2 G/DL (ref 14–18)
HIV 1+2 AB+HIV1 P24 AG SERPL QL IA: NEGATIVE
IMM GRANULOCYTES # BLD AUTO: 0.02 K/UL (ref 0–0.04)
IMM GRANULOCYTES NFR BLD AUTO: 0.3 % (ref 0–0.5)
LDLC SERPL CALC-MCNC: 115.2 MG/DL (ref 63–159)
LYMPHOCYTES # BLD AUTO: 2.5 K/UL (ref 1–4.8)
LYMPHOCYTES NFR BLD: 33.2 % (ref 18–48)
MCH RBC QN AUTO: 28.6 PG (ref 27–31)
MCHC RBC AUTO-ENTMCNC: 30.3 G/DL (ref 32–36)
MCV RBC AUTO: 95 FL (ref 82–98)
MONOCYTES # BLD AUTO: 0.8 K/UL (ref 0.3–1)
MONOCYTES NFR BLD: 11.2 % (ref 4–15)
NEUTROPHILS # BLD AUTO: 3.9 K/UL (ref 1.8–7.7)
NEUTROPHILS NFR BLD: 52.4 % (ref 38–73)
NONHDLC SERPL-MCNC: 169 MG/DL
NRBC BLD-RTO: 0 /100 WBC
PLATELET # BLD AUTO: 235 K/UL (ref 150–350)
PMV BLD AUTO: 10.5 FL (ref 9.2–12.9)
POTASSIUM SERPL-SCNC: 4.3 MMOL/L (ref 3.5–5.1)
PROT SERPL-MCNC: 7.2 G/DL (ref 6–8.4)
RBC # BLD AUTO: 4.61 M/UL (ref 4.6–6.2)
SODIUM SERPL-SCNC: 144 MMOL/L (ref 136–145)
TRIGL SERPL-MCNC: 269 MG/DL (ref 30–150)
WBC # BLD AUTO: 7.48 K/UL (ref 3.9–12.7)

## 2020-08-19 NOTE — PROGRESS NOTES
abNormal labs, elevated lipid panel. Otherwise normal labs  rec initiating meds to reduce cv risk. Discuss at next visit. Continue diet and exercise

## 2020-08-25 ENCOUNTER — TELEPHONE (OUTPATIENT)
Dept: INTERNAL MEDICINE | Facility: CLINIC | Age: 50
End: 2020-08-25

## 2020-08-25 NOTE — TELEPHONE ENCOUNTER
----- Message from Jerome Reyes MD sent at 8/19/2020  8:18 AM CDT -----  abNormal labs, elevated lipid panel. Otherwise normal labs  rec initiating meds to reduce cv risk. Discuss at next visit. Continue diet and exercise

## 2020-09-14 ENCOUNTER — OFFICE VISIT (OUTPATIENT)
Dept: INTERNAL MEDICINE | Facility: CLINIC | Age: 50
End: 2020-09-14
Payer: COMMERCIAL

## 2020-09-14 VITALS
DIASTOLIC BLOOD PRESSURE: 82 MMHG | RESPIRATION RATE: 18 BRPM | TEMPERATURE: 98 F | WEIGHT: 193 LBS | HEART RATE: 67 BPM | BODY MASS INDEX: 25.58 KG/M2 | HEIGHT: 73 IN | SYSTOLIC BLOOD PRESSURE: 126 MMHG | OXYGEN SATURATION: 98 %

## 2020-09-14 DIAGNOSIS — G43.909 MIGRAINE WITHOUT STATUS MIGRAINOSUS, NOT INTRACTABLE, UNSPECIFIED MIGRAINE TYPE: ICD-10-CM

## 2020-09-14 DIAGNOSIS — J02.9 PHARYNGITIS, UNSPECIFIED ETIOLOGY: ICD-10-CM

## 2020-09-14 DIAGNOSIS — I10 ESSENTIAL HYPERTENSION: Primary | ICD-10-CM

## 2020-09-14 DIAGNOSIS — E78.2 ELEVATED CHOLESTEROL WITH ELEVATED TRIGLYCERIDES: ICD-10-CM

## 2020-09-14 PROCEDURE — 99214 PR OFFICE/OUTPT VISIT, EST, LEVL IV, 30-39 MIN: ICD-10-PCS | Mod: S$GLB,,, | Performed by: FAMILY MEDICINE

## 2020-09-14 PROCEDURE — 99214 OFFICE O/P EST MOD 30 MIN: CPT | Mod: S$GLB,,, | Performed by: FAMILY MEDICINE

## 2020-09-14 PROCEDURE — 99999 PR PBB SHADOW E&M-EST. PATIENT-LVL III: ICD-10-PCS | Mod: PBBFAC,,, | Performed by: FAMILY MEDICINE

## 2020-09-14 PROCEDURE — 3008F BODY MASS INDEX DOCD: CPT | Mod: CPTII,S$GLB,, | Performed by: FAMILY MEDICINE

## 2020-09-14 PROCEDURE — 99999 PR PBB SHADOW E&M-EST. PATIENT-LVL III: CPT | Mod: PBBFAC,,, | Performed by: FAMILY MEDICINE

## 2020-09-14 PROCEDURE — 3008F PR BODY MASS INDEX (BMI) DOCUMENTED: ICD-10-PCS | Mod: CPTII,S$GLB,, | Performed by: FAMILY MEDICINE

## 2020-09-14 RX ORDER — ATORVASTATIN CALCIUM 20 MG/1
20 TABLET, FILM COATED ORAL DAILY
Qty: 90 TABLET | Refills: 3 | Status: CANCELLED | OUTPATIENT
Start: 2020-09-14 | End: 2021-09-14

## 2020-09-14 RX ORDER — CHLORTHALIDONE 25 MG/1
25 TABLET ORAL DAILY
Qty: 30 TABLET | Refills: 0 | Status: SHIPPED | OUTPATIENT
Start: 2020-09-14 | End: 2021-02-08

## 2020-09-14 NOTE — PROGRESS NOTES
"Subjective:       Patient ID: Alessandro Olivares Jr. is a 50 y.o. male.    Chief Complaint: Follow-up (HTN)    HPI  Went to chiro and normotensive bp, 128/82  Reports noncompliance   When taking, notes anxiety in am  Noncompliant to propranolol for migraines  Only taking bb prn  Desires to try another antihtn    Desires cscope  Denies fmh crc ca  Denies blood in stool or unintentional weight loss  Concerns d/t wife's father passed at 51yo from crc ca  And recent  who played black panther  Review of Systems   Constitutional: Positive for activity change (increasing), appetite change (watching diet) and fatigue. Negative for fever.   HENT: Positive for sore throat.    Respiratory: Positive for cough.    Cardiovascular: Positive for palpitations (w meds).   Gastrointestinal: Positive for constipation. Negative for blood in stool.   Genitourinary: Positive for frequency (w meds).   Allergic/Immunologic: Positive for environmental allergies.   Psychiatric/Behavioral: Positive for sleep disturbance (getting up to go to bathroom). The patient is nervous/anxious (w meds).         Objective:   BP (!) 142/78 (BP Location: Right arm, Patient Position: Sitting, BP Method: Medium (Manual))   Pulse 67   Temp 97.8 °F (36.6 °C) (Tympanic)   Resp 18   Ht 6' 1" (1.854 m)   Wt 87.5 kg (193 lb 0.2 oz)   SpO2 98%   BMI 25.46 kg/m²     BP Readings from Last 3 Encounters:   09/14/20 (!) 142/78   08/17/20 (!) 172/94   06/01/20 (!) 126/90       Lab Results   Component Value Date    HGBA1C 5.7 11/22/2016       Physical Exam  Vitals signs reviewed.   Constitutional:       General: He is not in acute distress.     Appearance: Normal appearance. He is well-developed. He is not ill-appearing or toxic-appearing.   HENT:      Head: Normocephalic and atraumatic.      Right Ear: Hearing normal.      Left Ear: Hearing normal.   Neck:      Musculoskeletal: Normal range of motion.   Cardiovascular:      Rate and Rhythm: Normal rate. "   Pulmonary:      Effort: Pulmonary effort is normal. No respiratory distress.   Abdominal:      Palpations: Abdomen is soft.   Musculoskeletal: Normal range of motion.   Skin:     General: Skin is warm and dry.   Neurological:      Mental Status: He is alert and oriented to person, place, and time.   Psychiatric:         Behavior: Behavior normal.       Assessment:     1. Essential hypertension    2. Elevated cholesterol with elevated triglycerides    3. Migraine without status migrainosus, not intractable, unspecified migraine type    4. Pharyngitis, unspecified etiology      Plan:     Problem List Items Addressed This Visit        Neuro    Migraine without status migrainosus, not intractable    Current Assessment & Plan     Taking bb prn. Advised suppose to augment antihtn meds            Cardiac/Vascular    Essential hypertension - Primary    Current Assessment & Plan     Slightly elevated. Reports noncompliance d/t heart racing and anxiety. Desires for alt med         Relevant Medications    chlorthalidone (HYGROTEN) 25 MG Tab    Other Relevant Orders    Comprehensive metabolic panel    CBC auto differential    Elevated cholesterol with elevated triglycerides    Overview     The 10-year ASCVD risk score (Kyung ENNIS Jr., et al., 2013) is: 13.9%    Values used to calculate the score:      Age: 50 years      Sex: Male      Is Non- : Yes      Diabetic: No      Tobacco smoker: No      Systolic Blood Pressure: 172 mmHg      Is BP treated: Yes      HDL Cholesterol: 50 mg/dL      Total Cholesterol: 219 mg/dL           Current Assessment & Plan     Encourage lifestyle changes with diet and exercise    Repeat in 3 mo. If elev, initate statine         Relevant Orders    Lipid Panel      Other Visit Diagnoses     Pharyngitis, unspecified etiology          sx tx for pharyngitis     Follow up in about 4 weeks (around 10/12/2020) for Follow Up, nonfasting.

## 2020-09-14 NOTE — PATIENT INSTRUCTIONS
Chlorthalidone tablets  What is this medicine?  CHLORTHALIDONE (klor THAL i done) is a diuretic. It increases the amount of urine passed, which causes the body to lose salt and water. This medicine is used to treat high blood pressure and edema or water retention.  How should I use this medicine?  Take this medicine by mouth with a glass of water. Follow the directions on the prescription label. It is best to take your dose in the morning with food. Take your medicine at regular intervals. Do not take your medicine more often than directed. Do not stop taking except on your doctor's advice.  Talk to your pediatrician regarding the use of this medicine in children. Special care may be needed.  What side effects may I notice from receiving this medicine?  Side effects that you should report to your doctor or health care professional as soon as possible:  · allergic reactions like skin rash, itching or hives, swelling of the face, lips, or tongue  · dark urine  · dry mouth  · excess thirst  · fast, irregular heart rate  · fever, chills  · muscle pain, cramps, or spasm  · nausea, vomiting  · redness, blistering, peeling or loosening of the skin, including inside the mouth  · tingling, pain or numbness in the hands or feet  · unusually weak or tired  · yellowing of the eyes or skin  Side effects that usually do not require medical attention (report to your doctor or health care professional if they continue or are bothersome):  · diarrhea or constipation  · headache  · impotence  · loss of appetite  · stomach upset  What may interact with this medicine?  · barbiturate medicines for sleep or seizure control  · digoxin  · lithium  · medicines for diabetes  · norepinephrine  · other medicines for high blood pressure  · some pain medicines  · steroid hormones like prednisone, cortisone, hydrocortisone, corticotropin  · tubocurarine  What if I miss a dose?  If you miss a dose, take it as soon as you can. If it is almost  time for your next dose, take only that dose. Do not take double or extra doses.  Where should I keep my medicine?  Keep out of the reach of children.  Store at room temperature between 15 and 30 degrees C (59 and 86 degrees F). Keep container tightly closed. Throw away any unused medicine after the expiration date.  What should I tell my health care provider before I take this medicine?  They need to know if you have any of these conditions:  · asthma  · diabetes  · gout  · kidney disease  · liver disease  · parathyroid disease  · systemic lupus erythematosus (SLE)  · taking cortisone, digoxin, lithium carbonate, or drugs for diabetes  · an unusual or allergic reaction to chlorthalidone, sulfa drugs, other medicines, foods, dyes, or preservatives  · pregnant or trying to get pregnant  · breast-feeding  What should I watch for while using this medicine?  Visit your doctor or health care professional for regular check ups. Check your blood pressure as directed. Ask your doctor or health care professional what your blood pressure should be and when you should contact him or her.  You may need to be on a special diet while taking this medicine. Ask your doctor.  You may get drowsy or dizzy. Do not drive, use machinery, or do anything that needs mental alertness until you know how this medicine affects you. Do not stand or sit up quickly, especially if you are an older patient. This reduces the risk of dizzy or fainting spells. Alcohol may interfere with the effect of this medicine. Avoid alcoholic drinks.  This medicine may affect your blood sugar level. If you have diabetes, check with your doctor or health care professional before changing the dose of your diabetic medicine.  This medicine can make you more sensitive to the sun. Keep out of the sun. If you cannot avoid being in the sun, wear protective clothing and use sunscreen. Do not use sun lamps or tanning beds/booths.  NOTE:This sheet is a summary. It may not  cover all possible information. If you have questions about this medicine, talk to your doctor, pharmacist, or health care provider. Copyright© 2017 [employer]        Self-Care for Sore Throats    Sore throats happen for many reasons, such as colds, allergies, and infections caused by viruses or bacteria. In any case, your throat becomes red and sore. Your goal for self-care is to reduce your discomfort while giving your throat a chance to heal.  Moisten and soothe your throat  Tips include the following:  · Try a sip of water first thing after waking up.  · Keep your throat moist by drinking 6 or more glasses of clear liquids every day.  · Run a cool-air humidifier in your room overnight.  · Avoid cigarette smoke.   · Suck on throat lozenges, cough drops, hard candy, ice chips, or frozen fruit-juice bars. Use the sugar-free versions if your diet or medical condition requires them.  Gargle to ease irritation  Gargling every hour or 2 can ease irritation. Try gargling with 1 of these solutions:  · 1/4 teaspoon of salt in 1/2 cup of warm water  · An over-the-counter anesthetic gargle  Use medicine for more relief  Over-the-counter medicine can reduce sore throat symptoms. Ask your pharmacist if you have questions about which medicine to use:  · Ease pain with anesthetic sprays. Aspirin or an aspirin substitute also helps. Remember, never give aspirin to anyone 18 or younger, or if you are already taking blood thinners.   · For sore throats caused by allergies, try antihistamines to block the allergic reaction.  · Remember: unless a sore throat is caused by a bacterial infection, antibiotics wont help you.  Prevent future sore throats  Prevention tips include the following:  · Stop smoking or reduce contact with secondhand smoke. Smoke irritates the tender throat lining.  · Limit contact with pets and with allergy-causing substances, such as pollen and mold.  · When youre around someone with a sore throat or cold,  wash your hands often to keep viruses or bacteria from spreading.  · Dont strain your vocal cords.  Call your healthcare provider  Contact your healthcare provider if you have:  · A temperature over 101°F (38.3°C)  · White spots on the throat  · Great difficulty swallowing  · Trouble breathing  · A skin rash  · Recent exposure to someone else with strep bacteria  · Severe hoarseness and swollen glands in the neck or jaw   Date Last Reviewed: 8/1/2016  © 9941-8228 [employer]. [address] [place] [address], [place] [address]. All rights reserved. This information is not intended as a substitute for professional medical care. Always follow your healthcare professional's instructions.

## 2020-09-28 ENCOUNTER — TELEPHONE (OUTPATIENT)
Dept: INTERNAL MEDICINE | Facility: CLINIC | Age: 50
End: 2020-09-28

## 2020-09-28 NOTE — TELEPHONE ENCOUNTER
----- Message from Comfort Carey sent at 9/28/2020 11:19 AM CDT -----  Regarding: return call  .Type:  Patient Returning Call    Who Called: pt  Who Left Message for Patient:   Does the patient know what this is regarding?: lab results   Would the patient rather a call back or a response via [facility]?  Call back   Best Call Back Number: [phone] (home)   Additional Information:

## 2020-11-05 ENCOUNTER — OFFICE VISIT (OUTPATIENT)
Dept: INTERNAL MEDICINE | Facility: CLINIC | Age: 50
End: 2020-11-05
Payer: COMMERCIAL

## 2020-11-05 ENCOUNTER — TELEPHONE (OUTPATIENT)
Dept: INTERNAL MEDICINE | Facility: CLINIC | Age: 50
End: 2020-11-05

## 2020-11-05 ENCOUNTER — HOSPITAL ENCOUNTER (EMERGENCY)
Facility: HOSPITAL | Age: 50
Discharge: HOME | End: 2020-11-05
Attending: EMERGENCY MEDICINE
Payer: COMMERCIAL

## 2020-11-05 VITALS
OXYGEN SATURATION: 98 % | WEIGHT: 192.88 LBS | BODY MASS INDEX: 25.56 KG/M2 | SYSTOLIC BLOOD PRESSURE: 145 MMHG | TEMPERATURE: 98 F | HEIGHT: 73 IN | RESPIRATION RATE: 14 BRPM | HEART RATE: 67 BPM | DIASTOLIC BLOOD PRESSURE: 94 MMHG

## 2020-11-05 VITALS
OXYGEN SATURATION: 98 % | BODY MASS INDEX: 25.9 KG/M2 | TEMPERATURE: 98 F | DIASTOLIC BLOOD PRESSURE: 98 MMHG | HEIGHT: 73 IN | SYSTOLIC BLOOD PRESSURE: 130 MMHG | WEIGHT: 195.44 LBS | HEART RATE: 77 BPM

## 2020-11-05 DIAGNOSIS — Z87.19 HISTORY OF GI BLEED: ICD-10-CM

## 2020-11-05 DIAGNOSIS — R11.2 NON-INTRACTABLE VOMITING WITH NAUSEA, UNSPECIFIED VOMITING TYPE: ICD-10-CM

## 2020-11-05 DIAGNOSIS — I10 ESSENTIAL HYPERTENSION: ICD-10-CM

## 2020-11-05 DIAGNOSIS — R11.2 NON-INTRACTABLE VOMITING WITH NAUSEA, UNSPECIFIED VOMITING TYPE: Primary | ICD-10-CM

## 2020-11-05 DIAGNOSIS — R42 VERTIGO: ICD-10-CM

## 2020-11-05 DIAGNOSIS — J33.9 NASAL POLYPS: ICD-10-CM

## 2020-11-05 DIAGNOSIS — J30.9 CHRONIC ALLERGIC RHINITIS: ICD-10-CM

## 2020-11-05 DIAGNOSIS — K92.0 HEMATEMESIS WITH NAUSEA: Primary | ICD-10-CM

## 2020-11-05 LAB
ALBUMIN SERPL BCP-MCNC: 4.4 G/DL (ref 3.5–5.2)
ALP SERPL-CCNC: 43 U/L (ref 55–135)
ALT SERPL W/O P-5'-P-CCNC: 13 U/L (ref 10–44)
ANION GAP SERPL CALC-SCNC: 6 MMOL/L (ref 8–16)
APTT BLDCRRT: 23.5 SEC (ref 21–32)
AST SERPL-CCNC: 15 U/L (ref 10–40)
BASOPHILS # BLD AUTO: 0.04 K/UL (ref 0–0.2)
BASOPHILS NFR BLD: 0.3 % (ref 0–1.9)
BILIRUB SERPL-MCNC: 0.4 MG/DL (ref 0.1–1)
BUN SERPL-MCNC: 20 MG/DL (ref 6–20)
CALCIUM SERPL-MCNC: 9.4 MG/DL (ref 8.7–10.5)
CHLORIDE SERPL-SCNC: 106 MMOL/L (ref 95–110)
CO2 SERPL-SCNC: 27 MMOL/L (ref 23–29)
CREAT SERPL-MCNC: 0.9 MG/DL (ref 0.5–1.4)
DIFFERENTIAL METHOD: ABNORMAL
EOSINOPHIL # BLD AUTO: 0.1 K/UL (ref 0–0.5)
EOSINOPHIL NFR BLD: 0.8 % (ref 0–8)
ERYTHROCYTE [DISTWIDTH] IN BLOOD BY AUTOMATED COUNT: 15.1 % (ref 11.5–14.5)
EST. GFR  (AFRICAN AMERICAN): >60 ML/MIN/1.73 M^2
EST. GFR  (NON AFRICAN AMERICAN): >60 ML/MIN/1.73 M^2
ETHANOL SERPL-MCNC: <10 MG/DL
GLUCOSE SERPL-MCNC: 84 MG/DL (ref 70–110)
HCT VFR BLD AUTO: 42.8 % (ref 40–54)
HGB BLD-MCNC: 13.6 G/DL (ref 14–18)
IMM GRANULOCYTES # BLD AUTO: 0.05 K/UL (ref 0–0.04)
IMM GRANULOCYTES NFR BLD AUTO: 0.4 % (ref 0–0.5)
INR PPP: 1 (ref 0.8–1.2)
LIPASE SERPL-CCNC: 14 U/L (ref 4–60)
LYMPHOCYTES # BLD AUTO: 1.5 K/UL (ref 1–4.8)
LYMPHOCYTES NFR BLD: 11.4 % (ref 18–48)
MCH RBC QN AUTO: 28.8 PG (ref 27–31)
MCHC RBC AUTO-ENTMCNC: 31.8 G/DL (ref 32–36)
MCV RBC AUTO: 91 FL (ref 82–98)
MONOCYTES # BLD AUTO: 0.9 K/UL (ref 0.3–1)
MONOCYTES NFR BLD: 6.9 % (ref 4–15)
NEUTROPHILS # BLD AUTO: 10.6 K/UL (ref 1.8–7.7)
NEUTROPHILS NFR BLD: 80.2 % (ref 38–73)
NRBC BLD-RTO: 0 /100 WBC
PLATELET # BLD AUTO: 260 K/UL (ref 150–350)
PMV BLD AUTO: 9.3 FL (ref 9.2–12.9)
POTASSIUM SERPL-SCNC: 3.8 MMOL/L (ref 3.5–5.1)
PROT SERPL-MCNC: 7.6 G/DL (ref 6–8.4)
PROTHROMBIN TIME: 10.6 SEC (ref 9–12.5)
RBC # BLD AUTO: 4.73 M/UL (ref 4.6–6.2)
SODIUM SERPL-SCNC: 139 MMOL/L (ref 136–145)
WBC # BLD AUTO: 13.25 K/UL (ref 3.9–12.7)

## 2020-11-05 PROCEDURE — 25000003 PHARM REV CODE 250: Performed by: REGISTERED NURSE

## 2020-11-05 PROCEDURE — 3008F BODY MASS INDEX DOCD: CPT | Mod: CPTII,S$GLB,, | Performed by: FAMILY MEDICINE

## 2020-11-05 PROCEDURE — 99214 OFFICE O/P EST MOD 30 MIN: CPT | Mod: S$GLB,,, | Performed by: FAMILY MEDICINE

## 2020-11-05 PROCEDURE — 80320 DRUG SCREEN QUANTALCOHOLS: CPT

## 2020-11-05 PROCEDURE — 96361 HYDRATE IV INFUSION ADD-ON: CPT

## 2020-11-05 PROCEDURE — 99214 PR OFFICE/OUTPT VISIT, EST, LEVL IV, 30-39 MIN: ICD-10-PCS | Mod: S$GLB,,, | Performed by: FAMILY MEDICINE

## 2020-11-05 PROCEDURE — 83690 ASSAY OF LIPASE: CPT

## 2020-11-05 PROCEDURE — 36415 COLL VENOUS BLD VENIPUNCTURE: CPT

## 2020-11-05 PROCEDURE — 85610 PROTHROMBIN TIME: CPT

## 2020-11-05 PROCEDURE — 80053 COMPREHEN METABOLIC PANEL: CPT

## 2020-11-05 PROCEDURE — 63600175 PHARM REV CODE 636 W HCPCS: Performed by: EMERGENCY MEDICINE

## 2020-11-05 PROCEDURE — 96375 TX/PRO/DX INJ NEW DRUG ADDON: CPT

## 2020-11-05 PROCEDURE — 3008F PR BODY MASS INDEX (BMI) DOCUMENTED: ICD-10-PCS | Mod: CPTII,S$GLB,, | Performed by: FAMILY MEDICINE

## 2020-11-05 PROCEDURE — 99999 PR PBB SHADOW E&M-EST. PATIENT-LVL III: CPT | Mod: PBBFAC,,, | Performed by: FAMILY MEDICINE

## 2020-11-05 PROCEDURE — 85025 COMPLETE CBC W/AUTO DIFF WBC: CPT

## 2020-11-05 PROCEDURE — 99999 PR PBB SHADOW E&M-EST. PATIENT-LVL III: ICD-10-PCS | Mod: PBBFAC,,, | Performed by: FAMILY MEDICINE

## 2020-11-05 PROCEDURE — 99284 EMERGENCY DEPT VISIT MOD MDM: CPT | Mod: 25

## 2020-11-05 PROCEDURE — 63600175 PHARM REV CODE 636 W HCPCS: Performed by: REGISTERED NURSE

## 2020-11-05 PROCEDURE — C9113 INJ PANTOPRAZOLE SODIUM, VIA: HCPCS | Performed by: EMERGENCY MEDICINE

## 2020-11-05 PROCEDURE — 96374 THER/PROPH/DIAG INJ IV PUSH: CPT

## 2020-11-05 PROCEDURE — 85730 THROMBOPLASTIN TIME PARTIAL: CPT

## 2020-11-05 RX ORDER — ONDANSETRON 8 MG/1
8 TABLET, ORALLY DISINTEGRATING ORAL EVERY 6 HOURS PRN
Qty: 6 TABLET | Refills: 0 | Status: SHIPPED | OUTPATIENT
Start: 2020-11-05 | End: 2020-11-19

## 2020-11-05 RX ORDER — MECLIZINE HYDROCHLORIDE 25 MG/1
25 TABLET ORAL 3 TIMES DAILY PRN
Qty: 30 TABLET | Refills: 0 | Status: SHIPPED | OUTPATIENT
Start: 2020-11-05 | End: 2021-02-08

## 2020-11-05 RX ORDER — PANTOPRAZOLE SODIUM 40 MG/10ML
40 INJECTION, POWDER, LYOPHILIZED, FOR SOLUTION INTRAVENOUS
Status: COMPLETED | OUTPATIENT
Start: 2020-11-05 | End: 2020-11-05

## 2020-11-05 RX ORDER — ONDANSETRON 2 MG/ML
4 INJECTION INTRAMUSCULAR; INTRAVENOUS
Status: COMPLETED | OUTPATIENT
Start: 2020-11-05 | End: 2020-11-05

## 2020-11-05 RX ORDER — LANSOPRAZOLE 15 MG/1
15 TABLET, ORALLY DISINTEGRATING, DELAYED RELEASE ORAL DAILY
Qty: 30 TABLET | Refills: 11 | Status: SHIPPED | OUTPATIENT
Start: 2020-11-05 | End: 2021-02-08

## 2020-11-05 RX ORDER — ONDANSETRON 4 MG/1
4 TABLET, FILM COATED ORAL EVERY 6 HOURS
Qty: 12 TABLET | Refills: 0 | Status: SHIPPED | OUTPATIENT
Start: 2020-11-05 | End: 2021-02-08

## 2020-11-05 RX ORDER — SUCRALFATE 1 G/1
1 TABLET ORAL 4 TIMES DAILY
Qty: 120 TABLET | Refills: 0 | Status: SHIPPED | OUTPATIENT
Start: 2020-11-05 | End: 2020-12-05

## 2020-11-05 RX ORDER — PANTOPRAZOLE SODIUM 40 MG/1
40 TABLET, DELAYED RELEASE ORAL DAILY
Qty: 30 TABLET | Refills: 1 | Status: ON HOLD | OUTPATIENT
Start: 2020-11-05 | End: 2020-11-20

## 2020-11-05 RX ADMIN — ONDANSETRON 4 MG: 2 INJECTION INTRAMUSCULAR; INTRAVENOUS at 05:11

## 2020-11-05 RX ADMIN — PANTOPRAZOLE SODIUM 40 MG: 40 INJECTION, POWDER, LYOPHILIZED, FOR SOLUTION INTRAVENOUS at 05:11

## 2020-11-05 RX ADMIN — SODIUM CHLORIDE 1000 ML: 0.9 INJECTION, SOLUTION INTRAVENOUS at 05:11

## 2020-11-05 NOTE — PATIENT INSTRUCTIONS
Meclizine tablets or capsules  What is this medicine?  MECLIZINE (YULIANA sosa) is an antihistamine. It is used to prevent nausea, vomiting, or dizziness caused by motion sickness. It is also used to prevent and treat vertigo (extreme dizziness or a feeling that you or your surroundings are tilting or spinning around).  How should I use this medicine?  Take this medicine by mouth with a glass of water. Follow the directions on the prescription label. If you are using this medicine to prevent motion sickness, take the dose at least 1 hour before travel. If it upsets your stomach, take it with food or milk. Take your doses at regular intervals. Do not take your medicine more often than directed.  Talk to your pediatrician regarding the use of this medicine in children. Special care may be needed.  What side effects may I notice from receiving this medicine?  Side effects that you should report to your doctor or health care professional as soon as possible:  · feeling faint or lightheaded, falls  · fast, irregular heartbeat  Side effects that usually do not require medical attention (report these to your doctor or health care professional if they continue or are bothersome):  · constipation  · headache  · trouble passing urine or change in the amount of urine  · trouble sleeping  · upset stomach  What may interact with this medicine?  Do not take this medicine with any of the following medications:  · MAOIs like Carbex, Eldepryl, Marplan, Nardil, and Parnate  This medicine may also interact with the following medications:  · alcohol  · antihistamines for allergy, cough and cold  · certain medicines for anxiety or sleep  · certain medicines for depression, like amitriptyline, fluoxetine, sertraline  · certain medicines for seizures like phenobarbital, primidone  · general anesthetics like halothane, isoflurane, methoxyflurane, propofol  · local anesthetics like lidocaine, pramoxine, tetracaine  · medicines that relax  muscles for surgery  · narcotic medicines for pain  · phenothiazines like chlorpromazine, mesoridazine, prochlorperazine, thioridazine  What if I miss a dose?  If you miss a dose, take it as soon as you can. If it is almost time for your next dose, take only that dose. Do not take double or extra doses.  Where should I keep my medicine?  Keep out of the reach of children.  Store at room temperature between 15 and 30 degrees C (59 and 86 degrees F). Keep container tightly closed. Throw away any unused medicine after the expiration date.  What should I tell my health care provider before I take this medicine?  They need to know if you have any of these conditions:  · glaucoma  · lung or breathing disease, like asthma  · problems urinating  · prostate disease  · stomach or intestine problems  · an unusual or allergic reaction to meclizine, other medicines, foods, dyes, or preservatives  · pregnant or trying to get pregnant  · breast-feeding  What should I watch for while using this medicine?  Tell your doctor or healthcare professional if your symptoms do not start to get better or if they get worse.  You may get drowsy or dizzy. Do not drive, use machinery, or do anything that needs mental alertness until you know how this medicine affects you. Do not stand or sit up quickly, especially if you are an older patient. This reduces the risk of dizzy or fainting spells. Alcohol may interfere with the effect of this medicine. Avoid alcoholic drinks.  Your mouth may get dry. Chewing sugarless gum or sucking hard candy, and drinking plenty of water may help. Contact your doctor if the problem does not go away or is severe.  This medicine may cause dry eyes and blurred vision. If you wear contact lenses you may feel some discomfort. Lubricating drops may help. See your eye doctor if the problem does not go away or is severe.  NOTE:This sheet is a summary. It may not cover all possible information. If you have questions about  this medicine, talk to your doctor, pharmacist, or health care provider. Copyright© 2017 [employer]        This maneuver should be carried out three times a day. Repeat this daily until you are free from positional vertigo for 24 hours.    Benign Paroxysmal Positional Vertigo     Your health care provider may move your head in certain ways to treat your BPPV.     Benign paroxysmal positional vertigo (BPPV) is a problem with the inner ear. The inner ear contains the vestibular system. This system is what helps you keep your balance. BPPV causes a feeling of spinning. It is a common problem of the vestibular system.  Understanding the vestibular system  The vestibular system of the ear is made up of very tiny parts. They include the utricle, saccule, and semicircular canals. The utricle is a tiny organ that contains calcium crystals. In some people, the crystals can move into the semicircular canals. When this happens, the system no longer works as it should. This causes BPPV. Benign means it is not life-threatening. Paroxysmal means it happens suddenly. Positional means that it happens when you move your head. Vertigo is a feeling of spinning.  What causes BPPV?  Causes include injury to your head or neck. Other problems with the vestibular system may cause BPPV. In many people, the cause of BPPV is not known.  Symptoms of BPPV  You many have repeated feelings of spinning (vertigo). The vertigo usually lasts less than 1 minute. Some movements, suchas rolling over in bed, can bring on vertigo.  Diagnosing BPPV  Your primary health care provider may diagnose and treat your BPPV. Or you may see an ear, nose, and throat doctor (otolaryngologist). In some cases, you may see a nervous system doctor (neurologist).  The health care provider will ask about your symptoms and your medical history. He or she will examine you. You may have hearing and balance tests. As part of the exam, your health care provider may have you  move your head and body in certain ways. If you have BPPV, the movements can bring on vertigo. Your provider will also look for abnormal movements of your eyes. You may have other tests to check your vestibular or nervous systems.  Treatment for BPPV  Your health care provider may try to move the calcium crystals. This is done by having you move your head and neck in certain ways. This treatment is safe and often works well. You may also be told to do these movements at home. You may still have vertigo for a few weeks. Your health care provider will recheck your symptoms, usually in about a month. Special physical therapy may also be part of treatment. In rare cases surgery may be needed for BPPV that does not go away.     When to call the health care provider  Call your health care provider right away if you have any of these:  · Symptoms that do not go away with treatment  · Symptoms that get worse  · New symptoms   Date Last Reviewed: 3/19/2015  © 7942-4987 [employer]. [address] [place] [address], [place] [address]. All rights reserved. This information is not intended as a substitute for professional medical care. Always follow your healthcare professional's instructions.

## 2020-11-05 NOTE — TELEPHONE ENCOUNTER
Spoke with pt regarding apt and informed him that the doctor will still see him if he's no more than 30 minutes late.

## 2020-11-05 NOTE — TELEPHONE ENCOUNTER
----- Message from Alanis Hurst sent at 11/5/2020 10:39 AM CST -----  Contact: Pt  Pt called to adv the he will be 20 mins late for appt, and he can be reached at [phone] (home)     Thanks    Alanis Hurst

## 2020-11-05 NOTE — ED PROVIDER NOTES
11/5/2020, 3:38 PM   History obtained from the patient      History of Present Illness: Alessandro Olivares Jr. is a 50 y.o. male patient with PMHx of GI ulcer who presents to the Emergency Department for bloody emesis which onset 2 days ago.    3:39 PM: Pt was placed back in [facility]. I explained to pt that due to lack of available rooms pt was seen by myself to begin the workup. Pt understands they will be seen and dispositioned by the next available physician. Pt voices understanding and agrees with plan. Pt also understands the longer than normal wait time. I am removing myself from the care of pt and pt will now be assigned to the next available physician.     Jaden Dee Jr., [facility]    SCRIBE #1 NOTE: I, Diana Ramirez, am scribing for, and in the presence of, Patience Payne MD. I have scribed the entire note.       History     Chief Complaint   Patient presents with    Vomiting     Pt. c/o hematemesis - sent from Dr. Reyes's office for further work-up. Denies pain. C/o feeling dizzy/light-headed. S/O 2d ago, worse today.     Review of patient's allergies indicates:   Allergen Reactions    Pollen extracts          History of Present Illness     HPI    11/5/2020, 4:41 PM  History obtained from the patient      History of Present Illness: Alessandro Olivares Jr. is a 50 y.o. male patient who presents to the Emergency Department for evaluation of emesis x 2 days. Pt was sent to the ED by Dr. Reyes's office for further work-up. Pt reports that he noticed some blood in his vomit after 1 episode today. Symptoms are episodic and moderate in severity. No mitigating or exacerbating factors reported. Associated sxs include dizziness, light-headedness, 1x episode of hematemesis, nausea, and constipation x 2 days. Patient denies any fever, chills, diarrhea, dysuria, hematuria, flank pain, abd pain, blood in stool, and all other sxs at this time. No prior Tx reported. No further complaints or concerns at this time.        Arrival mode: Personal transportation      PCP: Radha Golden MD      Past Medical History:  Past Medical History:   Diagnosis Date    Allergy     Bleeding gastric ulcer     GERD (gastroesophageal reflux disease)     Hypertension     Migraine headache        Past Surgical History:  Past Surgical History:   Procedure Laterality Date    NASAL DILATION      TONSILLECTOMY           Family History:  Family History   Problem Relation Age of Onset    Diabetes Mother     Heart attack Mother     Hypertension Father        Social History:   Social History     Tobacco Use    Smoking status: Never Smoker    Smokeless tobacco: Never Used   Substance and Sexual Activity    Alcohol use: No    Drug use: No    Sexual activity: Yes     Partners: Female        Review of Systems     Review of Systems   Constitutional: Negative for chills and fever.   HENT: Negative for congestion and sore throat.    Respiratory: Negative for cough and shortness of breath.    Cardiovascular: Negative for chest pain.   Gastrointestinal: Positive for constipation, nausea and vomiting. Negative for abdominal pain, blood in stool and diarrhea.        (+) 1x episode of hematemesis   Genitourinary: Negative for dysuria, flank pain and hematuria.   Musculoskeletal: Negative for back pain.   Skin: Negative for rash.   Neurological: Positive for dizziness and light-headedness. Negative for weakness.   Hematological: Does not bruise/bleed easily.   All other systems reviewed and are negative.       Physical Exam     Initial Vitals [11/05/20 1536]   BP Pulse Resp Temp SpO2   (!) 133/94 70 16 98.3 °F (36.8 °C) 100 %      MAP       --          Physical Exam  Nursing Notes and Vital Signs Reviewed.  Constitutional: Well-developed and well-nourished.   Head: Atraumatic. Normocephalic.  Eyes: EOM intact. No scleral icterus.  ENT: Mucous membranes are moist. Oropharynx is clear and symmetric.    Neck: Supple. Full ROM. No  "lymphadenopathy.  Cardiovascular: Regular rate. Regular rhythm. No murmurs, rubs, or gallops. Distal pulses are 2+ and symmetric.  Pulmonary/Chest: No respiratory distress. Clear to auscultation bilaterally. No wheezing or rales.  Abdominal: Soft and non-distended.  There is no tenderness.  No rebound, guarding, or rigidity.  Genitourinary: No CVA tenderness  Musculoskeletal: Moves all extremities. No obvious deformities. No calf tenderness.  Skin: Warm and dry.  Neurological:  Alert, awake, and appropriate.  Normal speech.  No acute focal neurological deficits are appreciated.  Psychiatric: Normal affect. Good eye contact. Appropriate in content.     ED Course   Procedures  ED Vital Signs:  Vitals:    11/05/20 1536 11/05/20 1620 11/05/20 1731 11/05/20 1917   BP: (!) 133/94  (!) 158/91 (!) 145/94   Pulse: 70 64 61 67   Resp: 16  16 14   Temp: 98.3 °F (36.8 °C)   98 °F (36.7 °C)   TempSrc: Oral   Oral   SpO2: 100%  100% 98%   Weight: 87.5 kg (192 lb 14.4 oz)      Height: 6' 1" (1.854 m)          Abnormal Lab Results:  Labs Reviewed   CBC W/ AUTO DIFFERENTIAL - Abnormal; Notable for the following components:       Result Value    WBC 13.25 (*)     Hemoglobin 13.6 (*)     MCHC 31.8 (*)     RDW 15.1 (*)     Gran # (ANC) 10.6 (*)     Immature Grans (Abs) 0.05 (*)     Gran % 80.2 (*)     Lymph % 11.4 (*)     All other components within normal limits   COMPREHENSIVE METABOLIC PANEL - Abnormal; Notable for the following components:    Alkaline Phosphatase 43 (*)     Anion Gap 6 (*)     All other components within normal limits   LIPASE   ALCOHOL,MEDICAL (ETHANOL)   PROTIME-INR   APTT   LIPASE   ALCOHOL,MEDICAL (ETHANOL)        All Lab Results:  Results for orders placed or performed during the hospital encounter of 11/05/20   CBC auto differential   Result Value Ref Range    WBC 13.25 (H) 3.90 - 12.70 K/uL    RBC 4.73 4.60 - 6.20 M/uL    Hemoglobin 13.6 (L) 14.0 - 18.0 g/dL    Hematocrit 42.8 40.0 - 54.0 %    MCV 91 82 - 98 " fL    MCH 28.8 27.0 - 31.0 pg    MCHC 31.8 (L) 32.0 - 36.0 g/dL    RDW 15.1 (H) 11.5 - 14.5 %    Platelets 260 150 - 350 K/uL    MPV 9.3 9.2 - 12.9 fL    Immature Granulocytes 0.4 0.0 - 0.5 %    Gran # (ANC) 10.6 (H) 1.8 - 7.7 K/uL    Immature Grans (Abs) 0.05 (H) 0.00 - 0.04 K/uL    Lymph # 1.5 1.0 - 4.8 K/uL    Mono # 0.9 0.3 - 1.0 K/uL    Eos # 0.1 0.0 - 0.5 K/uL    Baso # 0.04 0.00 - 0.20 K/uL    nRBC 0 0 /100 WBC    Gran % 80.2 (H) 38.0 - 73.0 %    Lymph % 11.4 (L) 18.0 - 48.0 %    Mono % 6.9 4.0 - 15.0 %    Eosinophil % 0.8 0.0 - 8.0 %    Basophil % 0.3 0.0 - 1.9 %    Differential Method Automated    Comprehensive metabolic panel   Result Value Ref Range    Sodium 139 136 - 145 mmol/L    Potassium 3.8 3.5 - 5.1 mmol/L    Chloride 106 95 - 110 mmol/L    CO2 27 23 - 29 mmol/L    Glucose 84 70 - 110 mg/dL    BUN 20 6 - 20 mg/dL    Creatinine 0.9 0.5 - 1.4 mg/dL    Calcium 9.4 8.7 - 10.5 mg/dL    Total Protein 7.6 6.0 - 8.4 g/dL    Albumin 4.4 3.5 - 5.2 g/dL    Total Bilirubin 0.4 0.1 - 1.0 mg/dL    Alkaline Phosphatase 43 (L) 55 - 135 U/L    AST 15 10 - 40 U/L    ALT 13 10 - 44 U/L    Anion Gap 6 (L) 8 - 16 mmol/L    eGFR if African American >60 >60 mL/min/1.73 m^2    eGFR if non African American >60 >60 mL/min/1.73 m^2   Protime-INR   Result Value Ref Range    Prothrombin Time 10.6 9.0 - 12.5 sec    INR 1.0 0.8 - 1.2   APTT   Result Value Ref Range    aPTT 23.5 21.0 - 32.0 sec   Lipase   Result Value Ref Range    Lipase 14 4 - 60 U/L   Ethanol   Result Value Ref Range    Alcohol, Serum <10 <10 mg/dL           The Emergency Provider reviewed the vital signs and test results, which are outlined above.     ED Discussion       6:40 PM: Reassessed pt at this time. Pt has not vomited in th ER. Pt has stable vital signs and lab results that show nothing to suggest active GI bleed. Pt states his condition has improved at this time. Discussed with pt all pertinent ED information and results. Discussed pt dx and plan of  tx. Gave pt all f/u and return to the ED instructions. All questions and concerns were addressed at this time. Pt expresses understanding of information and instructions, and is comfortable with plan to discharge. Pt is stable for discharge.    I discussed with patient and/or family/caretaker that evaluation in the ED does not suggest any emergent or life threatening medical conditions requiring immediate intervention beyond what was provided in the ED, and I believe patient is safe for discharge.  Regardless, an unremarkable evaluation in the ED does not preclude the development or presence of a serious of life threatening condition. As such, patient was instructed to return immediately for any worsening or change in current symptoms.       MDM        Medical Decision Making:   Clinical Tests:   Lab Tests: Ordered and Reviewed           ED Medication(s):  Medications   ondansetron injection 4 mg (4 mg Intravenous Given 11/5/20 1706)   sodium chloride 0.9% bolus 1,000 mL (0 mLs Intravenous Stopped 11/5/20 1846)   pantoprazole injection 40 mg (40 mg Intravenous Given 11/5/20 1706)       New Prescriptions    ONDANSETRON (ZOFRAN) 4 MG TABLET    Take 1 tablet (4 mg total) by mouth every 6 (six) hours.    PANTOPRAZOLE (PROTONIX) 40 MG TABLET    Take 1 tablet (40 mg total) by mouth once daily.       Follow-up Information     [facility]. Schedule an appointment as soon as possible for a visit in 2 days.    Specialty: Gastroenterology  Why: Return to the Emergency Room, If symptoms worsen  Contact information:  [address] [facility] [address]  [phone]                     Scribe Attestation:   Scribe #1: I performed the above scribed service and the documentation accurately describes the services I performed. I attest to the accuracy of the note.     Attending:   Physician Attestation Statement for Scribe #1: I, Patience Payne MD, personally performed the services described  in this documentation, as scribed by Diana Ramirez, in my presence, and it is both accurate and complete.           Clinical Impression       ICD-10-CM ICD-9-CM   1. Non-intractable vomiting with nausea, unspecified vomiting type  R11.2 787.01       Disposition:   Disposition: Discharged  Condition: Stable         Patience Payne MD  11/05/20 1924

## 2020-11-05 NOTE — PROGRESS NOTES
"Subjective:       Patient ID: Alessandro Olivares Jr. is a 50 y.o. male.    Chief Complaint: Dizziness (Pt states that symptoms have been ongoing for 2 days.States that it gets worse upon standing.) and light headed    HPI  Here for dizziness  Denies hearing loss  Onset 2 days  Recent changes to bp meds, chlorthalidione  Worse upon standing  Denies syncope    Reports similar sx in past with GI bleed  No longer taking zantac or pepcid   30 years ago  [facility]   With bleeding ulcer  A/w eating spicy foods  A/w hemoptysis     Reports noncompliance to chlorthalidone  Reports home bp readings have been 120-130/80-90s    Has been taking excedrin plus for HA, daily  Review of Systems   Constitutional: Positive for appetite change (increased). Negative for fever.   HENT: Positive for congestion and sinus pressure.    Respiratory: Positive for cough (hemoptysis). Negative for shortness of breath.    Cardiovascular: Negative for chest pain.   Gastrointestinal: Positive for abdominal pain, constipation, nausea and vomiting.   Neurological: Positive for dizziness and light-headedness. Negative for syncope, facial asymmetry, speech difficulty and weakness.        Objective:   BP (!) 130/98 (BP Location: Left arm, Patient Position: Sitting, BP Method: Large (Manual))   Pulse 77   Temp 97.9 °F (36.6 °C) (Temporal)   Ht 6' 1" (1.854 m)   Wt 88.6 kg (195 lb 7 oz)   SpO2 98%   BMI 25.78 kg/m²     BP Readings from Last 3 Encounters:   11/05/20 (!) 130/98   09/14/20 126/82   08/17/20 (!) 172/94       Lab Results   Component Value Date    HGBA1C 5.7 11/22/2016       Physical Exam  Vitals signs reviewed.   Constitutional:       General: He is not in acute distress.     Appearance: Normal appearance. He is well-developed. He is ill-appearing. He is not toxic-appearing.   HENT:      Head: Normocephalic and atraumatic.      Right Ear: Hearing normal.      Left Ear: Hearing normal.   Neck:      Musculoskeletal: Normal range of " motion.   Cardiovascular:      Rate and Rhythm: Normal rate.   Pulmonary:      Effort: Pulmonary effort is normal. No respiratory distress.   Abdominal:      Palpations: Abdomen is soft.      Comments: Actively vomiting. Used hemoccult with gastric sample and pos    Musculoskeletal: Normal range of motion.   Skin:     General: Skin is warm and dry.   Neurological:      General: No focal deficit present.      Mental Status: He is alert and oriented to person, place, and time.      Sensory: No sensory deficit.      Motor: No weakness.      Coordination: Coordination normal.      Comments: Neg pronator drift  Unilateral nystagmus, fast beating component in left direction  Neg for bidirectional or changing nystagmus   Test of skew normal with no vertical correction after uncovering eye  Head thrust with catch up saccade     epley manuver with worsening dizziness     Psychiatric:         Behavior: Behavior normal.       Assessment:     1. Hematemesis with nausea    2. History of GI bleed    3. Vertigo    4. Chronic allergic rhinitis    5. Essential hypertension    6. Nasal polyps    7. Non-intractable vomiting with nausea, unspecified vomiting type      Plan:     Problem List Items Addressed This Visit        ENT    Nasal polyps       Cardiac/Vascular    Essential hypertension    Current Assessment & Plan     Elevated bp.not taking chlorathalidone  Reports lifestyle changes            Other    Chronic allergic rhinitis    Current Assessment & Plan     Chronic Afrin daily  Offered technique to wean off  Advised correct techinque for nasal spray/flonase             Other Visit Diagnoses     Hematemesis with nausea    -  Primary    History of GI bleed        Relevant Medications    lansoprazole (PREVACID SOLUTAB) 15 MG disintegrating tablet    sucralfate (CARAFATE) 1 gram tablet    Vertigo        Relevant Medications    meclizine (ANTIVERT) 25 mg tablet    Non-intractable vomiting with nausea, unspecified vomiting type         Relevant Medications    ondansetron (ZOFRAN-ODT) 8 MG TbDL      actively vomiting, concerns for hematemesis and h/o GIB with similar sx as this. Advised to go to ED for further evaluation    Ddx bppv and vestibular neuronitis, cva, gib  Follow up in about 4 weeks (around 12/3/2020) for nonfasting, Follow Up, Hypertension.

## 2020-11-05 NOTE — Clinical Note
"Alessandro"Rosenda Olivares was seen and treated in our emergency department on 11/5/2020.     COVID-19 is present in our communities across the state. There is limited testing for COVID at this time, so not all patients can be tested. In this situation, your employee meets the following criteria:    Alessandro Olivares Jr. has not been tested for COVID-19. He can return to work once they are asymptomatic for 72 hours without the use of fever reducing medications (Tylenol, Motrin, etc). Infection control policies of the employer should be followed, as well as good hand hygiene.      If you have any questions or concerns, or if I can be of further assistance, please do not hesitate to contact me.    Sincerely,             Patience Payne MD"

## 2020-11-05 NOTE — ASSESSMENT & PLAN NOTE
Chronic Afrin daily  Offered technique to wean off  Advised correct techinque for nasal spray/flonase

## 2020-11-06 ENCOUNTER — OFFICE VISIT (OUTPATIENT)
Dept: GASTROENTEROLOGY | Facility: CLINIC | Age: 50
End: 2020-11-06
Payer: COMMERCIAL

## 2020-11-06 VITALS
WEIGHT: 195.75 LBS | DIASTOLIC BLOOD PRESSURE: 70 MMHG | BODY MASS INDEX: 25.94 KG/M2 | SYSTOLIC BLOOD PRESSURE: 140 MMHG | HEIGHT: 73 IN | HEART RATE: 70 BPM

## 2020-11-06 DIAGNOSIS — R63.0 ANOREXIA: ICD-10-CM

## 2020-11-06 DIAGNOSIS — Z87.11 HISTORY OF PEPTIC ULCER DISEASE: ICD-10-CM

## 2020-11-06 DIAGNOSIS — Z12.11 COLON CANCER SCREENING: ICD-10-CM

## 2020-11-06 DIAGNOSIS — R11.2 NON-INTRACTABLE VOMITING WITH NAUSEA, UNSPECIFIED VOMITING TYPE: Primary | ICD-10-CM

## 2020-11-06 DIAGNOSIS — K59.09 OTHER CONSTIPATION: ICD-10-CM

## 2020-11-06 DIAGNOSIS — R19.4 CHANGE IN BOWEL HABITS: ICD-10-CM

## 2020-11-06 PROCEDURE — 3008F BODY MASS INDEX DOCD: CPT | Mod: CPTII,S$GLB,, | Performed by: INTERNAL MEDICINE

## 2020-11-06 PROCEDURE — 99999 PR PBB SHADOW E&M-EST. PATIENT-LVL III: ICD-10-PCS | Mod: PBBFAC,,, | Performed by: INTERNAL MEDICINE

## 2020-11-06 PROCEDURE — 99999 PR PBB SHADOW E&M-EST. PATIENT-LVL III: CPT | Mod: PBBFAC,,, | Performed by: INTERNAL MEDICINE

## 2020-11-06 PROCEDURE — 3008F PR BODY MASS INDEX (BMI) DOCUMENTED: ICD-10-PCS | Mod: CPTII,S$GLB,, | Performed by: INTERNAL MEDICINE

## 2020-11-06 PROCEDURE — 99204 OFFICE O/P NEW MOD 45 MIN: CPT | Mod: S$GLB,,, | Performed by: INTERNAL MEDICINE

## 2020-11-06 PROCEDURE — 99204 PR OFFICE/OUTPT VISIT, NEW, LEVL IV, 45-59 MIN: ICD-10-PCS | Mod: S$GLB,,, | Performed by: INTERNAL MEDICINE

## 2020-11-06 RX ORDER — SODIUM, POTASSIUM,MAG SULFATES 17.5-3.13G
SOLUTION, RECONSTITUTED, ORAL ORAL
Qty: 254 ML | Refills: 0 | Status: SHIPPED | OUTPATIENT
Start: 2020-11-06 | End: 2021-02-08 | Stop reason: ALTCHOICE

## 2020-11-06 NOTE — PROGRESS NOTES
Subjective:       Patient ID: Alessandro Olivares Jr. is a 50 y.o. male.    Chief Complaint: Other (ulcer)    The patient is here with complaint of Dizziness and anorexia for the past few days. He was seen by his PCP on yesterday, and in the office had an episode of N/V. The emesis was of food he had eaten for lunch and there was suspected of being some blood. He had no associated abdominal pain. He admits to taking Aleve and salicylates for headaches 3-4 times a week over the past 2 years or so. There has been no BRBPR or melena. There has been no weight loss. There is no aversion to the sight or smell of food. There is early satiety. He has had PUD in the past but this was over 20 years ago. Recent note of constipation. There is no FH of GI disturbances that has been defined. He has not had colonoscopy to this point. Increased gas.     The patient was seen in the emergency room when yesterday, and labs done showed him to be mildly anemic with a hemoglobin of 13.6 g. review of labs going back 2 years she was that his anemia is chronic, but his hemoglobin is up from 13.2 g 2 months ago.  He has normocytic indices.  His white count was also mildly increased.  Zofran and Protonix were recommended for the patient but he has not picked up either medicine to this point.    Review of Systems   Constitutional: Positive for chills and fatigue. Negative for activity change, appetite change, diaphoresis, fever and unexpected weight change.   HENT: Positive for postnasal drip. Negative for congestion, ear discharge, facial swelling, hearing loss, nosebleeds, sinus pressure, sneezing, tinnitus, trouble swallowing and voice change.         Loss of smell   Eyes: Negative for photophobia, redness and visual disturbance.   Respiratory: Negative for cough, chest tightness, shortness of breath and wheezing.    Cardiovascular: Negative for chest pain and palpitations.   Gastrointestinal: Positive for constipation, nausea and vomiting.  Negative for abdominal distention, abdominal pain, blood in stool, diarrhea and rectal pain.   Genitourinary: Negative for difficulty urinating, discharge, dysuria, flank pain, frequency, hematuria, scrotal swelling, testicular pain and urgency.   Musculoskeletal: Negative for arthralgias, back pain, gait problem, joint swelling, myalgias and neck stiffness.   Skin: Negative for color change, pallor, rash and wound.   Neurological: Positive for dizziness and headaches. Negative for tremors, seizures, syncope, facial asymmetry, speech difficulty, weakness, light-headedness and numbness.   Hematological: Negative for adenopathy. Does not bruise/bleed easily.   Psychiatric/Behavioral: Negative for agitation, confusion, hallucinations, sleep disturbance and suicidal ideas.       Objective:      Physical Exam  Vitals signs reviewed.   Constitutional:       General: He is not in acute distress.     Appearance: He is well-developed. He is not diaphoretic.   HENT:      Head: Normocephalic and atraumatic.      Nose: Nose normal.      Mouth/Throat:      Pharynx: No oropharyngeal exudate.   Eyes:      General: No scleral icterus.     Conjunctiva/sclera: Conjunctivae normal.      Pupils: Pupils are equal, round, and reactive to light.   Neck:      Musculoskeletal: Normal range of motion and neck supple.      Thyroid: No thyromegaly.   Cardiovascular:      Rate and Rhythm: Normal rate and regular rhythm.      Heart sounds: No murmur. No friction rub. No gallop.    Pulmonary:      Effort: Pulmonary effort is normal. No respiratory distress.      Breath sounds: Normal breath sounds. No wheezing or rales.   Abdominal:      General: Bowel sounds are normal. There is no distension.      Palpations: Abdomen is soft. There is no mass.      Tenderness: There is abdominal tenderness. There is no guarding or rebound.      Comments: Adalgisa-umbilical to epigastric abdominal tenderness   Musculoskeletal:         General: No tenderness.    Lymphadenopathy:      Cervical: No cervical adenopathy.   Skin:     General: Skin is warm.      Findings: No rash.   Neurological:      Mental Status: He is alert and oriented to person, place, and time.      Motor: No abnormal muscle tone.      Coordination: Coordination normal.   Psychiatric:         Behavior: Behavior normal.         Thought Content: Thought content normal.         Judgment: Judgment normal.         Assessment:   Non-intractable vomiting with nausea, unspecified vomiting type  -     Case request GI: EGD (ESOPHAGOGASTRODUODENOSCOPY), COLONOSCOPY    Colon cancer screening  -     Case request GI: EGD (ESOPHAGOGASTRODUODENOSCOPY), COLONOSCOPY  -     sodium,potassium,mag sulfates (SUPREP BOWEL PREP KIT) 17.5-3.13-1.6 gram SolR; As directed for colonoscopy  Dispense: 254 mL; Refill: 0    Anorexia  -     Case request GI: EGD (ESOPHAGOGASTRODUODENOSCOPY), COLONOSCOPY    History of peptic ulcer disease  -     Case request GI: EGD (ESOPHAGOGASTRODUODENOSCOPY), COLONOSCOPY    Other constipation  -     Case request GI: EGD (ESOPHAGOGASTRODUODENOSCOPY), COLONOSCOPY  -     sodium,potassium,mag sulfates (SUPREP BOWEL PREP KIT) 17.5-3.13-1.6 gram SolR; As directed for colonoscopy  Dispense: 254 mL; Refill: 0     Change in bowel habits      Plan:   EGD and colonoscopy  Fill recommended medication  Further recommendations to follow results of endoscopy

## 2020-11-09 ENCOUNTER — TELEPHONE (OUTPATIENT)
Dept: GASTROENTEROLOGY | Facility: CLINIC | Age: 50
End: 2020-11-09

## 2020-11-09 ENCOUNTER — TELEPHONE (OUTPATIENT)
Dept: ENDOSCOPY | Facility: HOSPITAL | Age: 50
End: 2020-11-09

## 2020-11-09 NOTE — TELEPHONE ENCOUNTER
----- Message from Fanny Michel sent at 11/9/2020  8:35 AM CST -----  Contact: Ashley - Wife  Request a call concerning the pt 12/07/2020 appt, no additional info given and can be reached at [phone]///[email or web address]

## 2020-11-09 NOTE — TELEPHONE ENCOUNTER
Location Screening:    If answers yes to any of the following, schedule at [facility] ONLY. If No, OK for either location.    1. Is there a diagnosis of heart failure, severe heart valve disease (aortic stenosis) or mechanical valve? no  a. Is the Left Ventricle Ejection Fraction <30% ? no    2. Does the pt have pulmonary hypertension? no   a. Is pulmonary arterial pressure gradient >50mmHg? no   b. Is there evidence of right ventricular dysfunction? no    3. Does the pt have achalasia? no    4. Any history of negative reaction to anesthesia? no   a. Myasthenia gravis? no   b. Malignant hyperthermia? no   c. Other? not applicable    5. Is procedure for esophageal banding? no      COVID Screening    1. Have you had a fever in the last 7 days or have you used fever reducing medicines for a fever in the last 7 days?  no    2. Are you experiencing shortness of breath, cough, muscle aches, loss of taste or loss of smell?  no    If answered yes to questions 1 and 2, the patient must seek medical attention with their PCP.  Do not schedule their procedure.     3. Are you residing with anyone who has tested positive for Covid?  no    If answered yes to question 3, recommend 14 day self-quarantine from the date of relative's positive test and place special needs note in the depot.  Wait to schedule.     ENDO screening    1. Have you been admitted for cardiac, kidney or pulmonary causes to the hospital in the past 3 months? no   If yes, schedule an appointment with PCP before scheduling endoscopic procedure.     2. Have you had a stent placed in the last 12 months? no   If yes, for a screening visit, cancel and message the ordering provider.  The patient will need a new order when the time is appropriate.     3. Have you had a stroke or heart attack in the past 6 months? no   If yes, cancel and refer patient to ordering provider for clearance, also message ordering provider to inform.     4. Have you had any chest pain in the past  "3 months? no   If yes, Have you been evaluated by your PCP and/or cardiologist and it was determined to not be heart related? not applicable   If No, Pt needs to be seen by PCP or Cardiologist .  Pt can be scheduled once clearance obtained by either of those providers.     5. Do you take prescription weight loss medications?  no   If yes, must stop for 2 weeks prior to procedure.     6. Have you been diagnosed with diverticulitis within the past 3 months? no   If yes, must have been seen by GI within the last 3 months, if not schedule with [facility].    If pt has been seen by GI, schedule procedure 8-12 weeks post antibiotic treatment.     7. Are you on Dialysis? no  If yes, schedule procedure for the day AFTER dialysis.  Appt time should be 9am or later, patient arrival time is 2 hours prior.  Nulytely or miralax prep for all patients with kidney disease.     8. Are you diabetic?  no   If yes, schedule morning appt. Advise pt to hold all diabetic meds day of procedure.     9. If pt is older than 80 years of age and HAS NOT been seen by GI or PCP within the last 6 months, needs appt with GI [facility].   If pt has been seen by the GI provider or PCP within the past 6  months AND meets criteria, schedule procedure AND send message to the endoscopist.     10. Is patient on a "high risk" medication (blood thinner/antiplatelet agent)?  no   If yes, has cardiac clearance been obtained within the last 60 days? No   If no, a new clearance needs to be obtained.     Final Questions:    1.I have reviewed the last colonoscopy for recommendations regarding next procedure bowel prep.  yes  2. I have reviewed medications and allergies.  yes  3. I have verified the pharmacy information and appropriate prep sent if needed. yes  4. Prep instructions have been mailed or sent to portal per patient request. yes        All schedulers will have ability to reach out to the ordering GI provider to clarify any issues.     "

## 2020-11-17 ENCOUNTER — LAB VISIT (OUTPATIENT)
Dept: OTOLARYNGOLOGY | Facility: CLINIC | Age: 50
End: 2020-11-17
Payer: COMMERCIAL

## 2020-11-17 DIAGNOSIS — Z12.11 COLON CANCER SCREENING: ICD-10-CM

## 2020-11-17 PROCEDURE — U0003 INFECTIOUS AGENT DETECTION BY NUCLEIC ACID (DNA OR RNA); SEVERE ACUTE RESPIRATORY SYNDROME CORONAVIRUS 2 (SARS-COV-2) (CORONAVIRUS DISEASE [COVID-19]), AMPLIFIED PROBE TECHNIQUE, MAKING USE OF HIGH THROUGHPUT TECHNOLOGIES AS DESCRIBED BY CMS-2020-01-R: HCPCS

## 2020-11-19 LAB — SARS-COV-2 RNA RESP QL NAA+PROBE: NOT DETECTED

## 2020-11-20 ENCOUNTER — ANESTHESIA (OUTPATIENT)
Dept: ENDOSCOPY | Facility: HOSPITAL | Age: 50
End: 2020-11-20
Payer: COMMERCIAL

## 2020-11-20 ENCOUNTER — ANESTHESIA EVENT (OUTPATIENT)
Dept: ENDOSCOPY | Facility: HOSPITAL | Age: 50
End: 2020-11-20
Payer: COMMERCIAL

## 2020-11-20 ENCOUNTER — HOSPITAL ENCOUNTER (OUTPATIENT)
Facility: HOSPITAL | Age: 50
Discharge: HOME | End: 2020-11-20
Attending: INTERNAL MEDICINE | Admitting: INTERNAL MEDICINE
Payer: COMMERCIAL

## 2020-11-20 VITALS
TEMPERATURE: 98 F | WEIGHT: 191.56 LBS | BODY MASS INDEX: 25.39 KG/M2 | OXYGEN SATURATION: 100 % | RESPIRATION RATE: 17 BRPM | DIASTOLIC BLOOD PRESSURE: 84 MMHG | HEART RATE: 82 BPM | SYSTOLIC BLOOD PRESSURE: 121 MMHG | HEIGHT: 73 IN

## 2020-11-20 DIAGNOSIS — R11.2 NAUSEA AND VOMITING, INTRACTABILITY OF VOMITING NOT SPECIFIED, UNSPECIFIED VOMITING TYPE: Primary | ICD-10-CM

## 2020-11-20 DIAGNOSIS — K29.80 DUODENITIS: Primary | ICD-10-CM

## 2020-11-20 PROCEDURE — 37000009 HC ANESTHESIA EA ADD 15 MINS: Performed by: INTERNAL MEDICINE

## 2020-11-20 PROCEDURE — 88342 IMHCHEM/IMCYTCHM 1ST ANTB: CPT | Mod: 26,,, | Performed by: PATHOLOGY

## 2020-11-20 PROCEDURE — G0121 COLON CA SCRN NOT HI RSK IND: HCPCS | Performed by: INTERNAL MEDICINE

## 2020-11-20 PROCEDURE — 88342 IMHCHEM/IMCYTCHM 1ST ANTB: CPT | Performed by: PATHOLOGY

## 2020-11-20 PROCEDURE — 37000008 HC ANESTHESIA 1ST 15 MINUTES: Performed by: INTERNAL MEDICINE

## 2020-11-20 PROCEDURE — 43239 EGD BIOPSY SINGLE/MULTIPLE: CPT | Mod: 51,,, | Performed by: INTERNAL MEDICINE

## 2020-11-20 PROCEDURE — G0121 COLON CA SCRN NOT HI RSK IND: ICD-10-PCS | Mod: ,,, | Performed by: INTERNAL MEDICINE

## 2020-11-20 PROCEDURE — 63600175 PHARM REV CODE 636 W HCPCS: Performed by: NURSE ANESTHETIST, CERTIFIED REGISTERED

## 2020-11-20 PROCEDURE — 88305 TISSUE EXAM BY PATHOLOGIST: CPT | Mod: 26,,, | Performed by: PATHOLOGY

## 2020-11-20 PROCEDURE — 27201012 HC FORCEPS, HOT/COLD, DISP: Performed by: INTERNAL MEDICINE

## 2020-11-20 PROCEDURE — 88342 CHG IMMUNOCYTOCHEMISTRY: ICD-10-PCS | Mod: 26,,, | Performed by: PATHOLOGY

## 2020-11-20 PROCEDURE — 43239 PR EGD, FLEX, W/BIOPSY, SGL/MULTI: ICD-10-PCS | Mod: 51,,, | Performed by: INTERNAL MEDICINE

## 2020-11-20 PROCEDURE — 25000003 PHARM REV CODE 250: Performed by: NURSE ANESTHETIST, CERTIFIED REGISTERED

## 2020-11-20 PROCEDURE — 88305 TISSUE EXAM BY PATHOLOGIST: ICD-10-PCS | Mod: 26,,, | Performed by: PATHOLOGY

## 2020-11-20 PROCEDURE — G0121 COLON CA SCRN NOT HI RSK IND: HCPCS | Mod: ,,, | Performed by: INTERNAL MEDICINE

## 2020-11-20 PROCEDURE — 88305 TISSUE EXAM BY PATHOLOGIST: CPT | Mod: 59 | Performed by: PATHOLOGY

## 2020-11-20 PROCEDURE — 43239 EGD BIOPSY SINGLE/MULTIPLE: CPT | Performed by: INTERNAL MEDICINE

## 2020-11-20 RX ORDER — LIDOCAINE HCL/PF 100 MG/5ML
SYRINGE (ML) INTRAVENOUS
Status: DISCONTINUED | OUTPATIENT
Start: 2020-11-20 | End: 2020-11-20

## 2020-11-20 RX ORDER — PANTOPRAZOLE SODIUM 40 MG/1
40 TABLET, DELAYED RELEASE ORAL 2 TIMES DAILY
Qty: 60 TABLET | Refills: 3 | Status: SHIPPED | OUTPATIENT
Start: 2020-11-20 | End: 2021-02-08

## 2020-11-20 RX ORDER — SODIUM CHLORIDE, SODIUM LACTATE, POTASSIUM CHLORIDE, CALCIUM CHLORIDE 600; 310; 30; 20 MG/100ML; MG/100ML; MG/100ML; MG/100ML
INJECTION, SOLUTION INTRAVENOUS CONTINUOUS PRN
Status: DISCONTINUED | OUTPATIENT
Start: 2020-11-20 | End: 2020-11-20

## 2020-11-20 RX ORDER — PROPOFOL 10 MG/ML
INJECTION, EMULSION INTRAVENOUS
Status: DISCONTINUED | OUTPATIENT
Start: 2020-11-20 | End: 2020-11-20

## 2020-11-20 RX ADMIN — PROPOFOL 30 MG: 10 INJECTION, EMULSION INTRAVENOUS at 08:11

## 2020-11-20 RX ADMIN — GLYCOPYRROLATE 0.2 MG: 0.2 INJECTION, SOLUTION INTRAMUSCULAR; INTRAVITREAL at 08:11

## 2020-11-20 RX ADMIN — PROPOFOL 30 MG: 10 INJECTION, EMULSION INTRAVENOUS at 09:11

## 2020-11-20 RX ADMIN — LIDOCAINE HYDROCHLORIDE 50 MG: 20 INJECTION, SOLUTION INTRAVENOUS at 08:11

## 2020-11-20 RX ADMIN — SODIUM CHLORIDE, SODIUM LACTATE, POTASSIUM CHLORIDE, AND CALCIUM CHLORIDE: 600; 310; 30; 20 INJECTION, SOLUTION INTRAVENOUS at 08:11

## 2020-11-20 RX ADMIN — PROPOFOL 100 MG: 10 INJECTION, EMULSION INTRAVENOUS at 08:11

## 2020-11-20 RX ADMIN — PROPOFOL 40 MG: 10 INJECTION, EMULSION INTRAVENOUS at 08:11

## 2020-11-20 NOTE — [employer] PATIENT INSTRUCTIONS
Discharge Summary/Instructions after an Endoscopic Procedure  Patient Name: Alessandro Olivares  Patient MRN: [id number]  Patient YOB: 1970  Friday, November 20, 2020 Tameka Burris MD  RESTRICTIONS:  During your procedure today, you received medications for sedation.  These   medications may affect your judgment, balance and coordination.  Therefore,   for 24 hours, you have the following restrictions:   - DO NOT drive a car, operate machinery, make legal/financial decisions,   sign important papers or drink alcohol.    ACTIVITY:  Today: no heavy lifting, straining or running due to procedural   sedation/anesthesia.  The following day: return to full activity including work.  DIET:  Eat and drink normally unless instructed otherwise.     TREATMENT FOR COMMON SIDE EFFECTS:  - Mild abdominal pain, nausea, belching, bloating or excessive gas:  rest,   eat lightly and use a heating pad.  - Sore Throat: treat with throat lozenges and/or gargle with warm salt   water.  - Because air was used during the procedure, expelling large amounts of air   from your rectum or belching is normal.  - If a bowel prep was taken, you may not have a bowel movement for 1-3 days.    This is normal.  SYMPTOMS TO WATCH FOR AND REPORT TO YOUR PHYSICIAN:  1. Abdominal pain or bloating, other than gas cramps.  2. Chest pain.  3. Back pain.  4. Signs of infection such as: chills or fever occurring within 24 hours   after the procedure.  5. Rectal bleeding, which would show as bright red, maroon, or black stools.   (A tablespoon of blood from the rectum is not serious, especially if   hemorrhoids are present.)  6. Vomiting.  7. Weakness or dizziness.  GO DIRECTLY TO THE NEAREST EMERGENCY ROOM IF YOU HAVE ANY OF THE FOLLOWING:      Difficulty breathing              Chills and/or fever over 101 F   Persistent vomiting and/or vomiting blood   Severe abdominal pain   Severe chest pain   Black, tarry stools   Bleeding- more than one  tablespoon   Any other symptom or condition that you feel may need urgent attention  Your doctor recommends these additional instructions:  If any biopsies were taken, your doctors clinic will contact you in 1 to 2   weeks with any results.  - Patient has a contact number available for emergencies.  The signs and   symptoms of potential delayed complications were discussed with the   patient.  Return to normal activities tomorrow.  Written discharge   instructions were provided to the patient.   - Discharge patient to home (via wheelchair).   - Resume previous diet today.   - Continue present medications.   - Repeat colonoscopy in 10 years for screening purposes.  For questions, problems or results please call your physician Tameka Burris MD at Work:  [phone]  If you have any questions about the above instructions, call the GI   department at [phone] or call the endoscopy unit at [phone]   from 7am until 3 pm.  [facility] [place], EMERGENCY ROOM PHONE NUMBER:   [phone]  IF A COMPLICATION OR EMERGENCY SITUATION ARISES AND YOU ARE UNABLE TO REACH   YOUR PHYSICIAN - GO DIRECTLY TO THE EMERGENCY ROOM.  I have read or have had read to me these discharge instructions for my   procedure and have received a written copy.  I understand these   instructions and will follow-up with my physician if I have any questions.     __________________________________       _____________________________________  Nurse Signature                                          Patient/Designated   Responsible Party Signature  MD Tameka Vick MD  11/20/2020 9:08:20 AM  This report has been verified and signed electronically.  [employer]

## 2020-11-20 NOTE — ANESTHESIA PREPROCEDURE EVALUATION
11/20/2020  Alessandro Olivares Jr. is a 50 y.o., male.    Anesthesia Evaluation    I have reviewed the Patient Summary Reports.    I have reviewed the Nursing Notes. I have reviewed the NPO Status.   I have reviewed the Medications.     Review of Systems  Anesthesia Hx:  No problems with previous Anesthesia  Denies Family Hx of Anesthesia complications.   Denies Personal Hx of Anesthesia complications.   Social:  Non-Smoker    Hematology/Oncology:  Hematology Normal   Oncology Normal     EENT/Dental:EENT/Dental Normal   Cardiovascular:   Hypertension    Pulmonary:  Pulmonary Normal    Renal/:  Renal/ Normal     Hepatic/GI:   PUD, GERD    Musculoskeletal:  Musculoskeletal Normal    Neurological:   Headaches    Endocrine:  Endocrine Normal    Dermatological:  Skin Normal    Psych:  Psychiatric Normal           Physical Exam  General:  Well nourished    Airway/Jaw/Neck:  Airway Findings: Mouth Opening: Normal Tongue: Normal  General Airway Assessment: Adult, Average  Mallampati: II  TM Distance: Normal, at least 6 cm      Dental:  Dental Findings: In tact   Chest/Lungs:  Chest/Lungs Findings: Clear to auscultation, Normal Respiratory Rate     Heart/Vascular:  Heart Findings: Rate: Normal  Rhythm: Regular Rhythm        Mental Status:  Mental Status Findings:  Cooperative, Alert and Oriented         Anesthesia Plan  Type of Anesthesia, risks & benefits discussed:  Anesthesia Type:  MAC  Patient's Preference:   Intra-op Monitoring Plan: standard ASA monitors  Intra-op Monitoring Plan Comments:   Post Op Pain Control Plan:   Post Op Pain Control Plan Comments:   Induction:   IV  Beta Blocker:  Patient is not currently on a Beta-Blocker (No further documentation required).       Informed Consent: Patient understands risks and agrees with Anesthesia plan.  Questions answered. Anesthesia consent signed with  patient.  ASA Score: 2     Day of Surgery Review of History & Physical: I have interviewed and examined the patient. I have reviewed the patient's H&P dated:  There are no significant changes.          Ready For Surgery From Anesthesia Perspective.

## 2020-11-20 NOTE — ANESTHESIA RELEASE NOTE
"Anesthesia Release from PACU Note    Patient: Alessandro Olivares Jr.    Procedure(s) Performed: Procedure(s) (LRB):  EGD (ESOPHAGOGASTRODUODENOSCOPY) (N/A)  COLONOSCOPY (N/A)    Anesthesia type: MAC    Post pain: Adequate analgesia    Post assessment: no apparent anesthetic complications, tolerated procedure well and no evidence of recall    Last Vitals:   Visit Vitals  BP (!) 136/96 (BP Location: Left arm)   Pulse 62   Temp 36.9 °C (98.4 °F) (Temporal)   Resp 18   Ht 6' 1" (1.854 m)   Wt 86.9 kg (191 lb 9.3 oz)   SpO2 96%   BMI 25.28 kg/m²       Post vital signs: stable    Level of consciousness: responds to stimulation    Nausea/Vomiting: no nausea/no vomiting    Complications: none    Airway Patency: patent    Respiratory: unassisted    Cardiovascular: stable and blood pressure at baseline    Hydration: euvolemic       "

## 2020-11-20 NOTE — PROVATION PATIENT INSTRUCTIONS
Discharge Summary/Instructions after an Endoscopic Procedure  Patient Name: Alessandro Olivares  Patient MRN: [id number]  Patient YOB: 1970  Friday, November 20, 2020 Tameka Burris MD  RESTRICTIONS:  During your procedure today, you received medications for sedation.  These   medications may affect your judgment, balance and coordination.  Therefore,   for 24 hours, you have the following restrictions:   - DO NOT drive a car, operate machinery, make legal/financial decisions,   sign important papers or drink alcohol.    ACTIVITY:  Today: no heavy lifting, straining or running due to procedural   sedation/anesthesia.  The following day: return to full activity including work.  DIET:  Eat and drink normally unless instructed otherwise.     TREATMENT FOR COMMON SIDE EFFECTS:  - Mild abdominal pain, nausea, belching, bloating or excessive gas:  rest,   eat lightly and use a heating pad.  - Sore Throat: treat with throat lozenges and/or gargle with warm salt   water.  - Because air was used during the procedure, expelling large amounts of air   from your rectum or belching is normal.  - If a bowel prep was taken, you may not have a bowel movement for 1-3 days.    This is normal.  SYMPTOMS TO WATCH FOR AND REPORT TO YOUR PHYSICIAN:  1. Abdominal pain or bloating, other than gas cramps.  2. Chest pain.  3. Back pain.  4. Signs of infection such as: chills or fever occurring within 24 hours   after the procedure.  5. Rectal bleeding, which would show as bright red, maroon, or black stools.   (A tablespoon of blood from the rectum is not serious, especially if   hemorrhoids are present.)  6. Vomiting.  7. Weakness or dizziness.  GO DIRECTLY TO THE NEAREST EMERGENCY ROOM IF YOU HAVE ANY OF THE FOLLOWING:      Difficulty breathing              Chills and/or fever over 101 F   Persistent vomiting and/or vomiting blood   Severe abdominal pain   Severe chest pain   Black, tarry stools   Bleeding- more than one  tablespoon   Any other symptom or condition that you feel may need urgent attention  Your doctor recommends these additional instructions:  If any biopsies were taken, your doctors clinic will contact you in 1 to 2   weeks with any results.  - Patient has a contact number available for emergencies.  The signs and   symptoms of potential delayed complications were discussed with the   patient.  Return to normal activities tomorrow.  Written discharge   instructions were provided to the patient.   - Discharge patient to home (via wheelchair).   - Resume previous diet today.   - Continue present medications.   - Use Protonix (pantoprazole) 40 mg PO BID today.   - Repeat upper endoscopy in 2 months to check healing and attempt to   traverse pylorus.  If stenotic, then can dilate at that time.  For questions, problems or results please call your physician Tameka Burris MD at Work:  [phone]  If you have any questions about the above instructions, call the GI   department at [phone] or call the endoscopy unit at [phone]   from 7am until 3 pm.  [facility] [place] [facility], EMERGENCY ROOM PHONE NUMBER:   [phone]  IF A COMPLICATION OR EMERGENCY SITUATION ARISES AND YOU ARE UNABLE TO REACH   YOUR PHYSICIAN - GO DIRECTLY TO THE EMERGENCY ROOM.  I have read or have had read to me these discharge instructions for my   procedure and have received a written copy.  I understand these   instructions and will follow-up with my physician if I have any questions.     __________________________________       _____________________________________  Nurse Signature                                          Patient/Designated   Responsible Party Signature  MD Tameka Vick MD  11/20/2020 9:18:22 AM  This report has been verified and signed electronically.  [employer]

## 2020-11-20 NOTE — ANESTHESIA POSTPROCEDURE EVALUATION
Anesthesia Post Evaluation    Patient: Alessandro Olivares Jr.    Procedure(s) Performed: Procedure(s) (LRB):  EGD (ESOPHAGOGASTRODUODENOSCOPY) (N/A)  COLONOSCOPY (N/A)    Final Anesthesia Type: MAC    Patient location during evaluation: PACU  Patient participation: Yes- Able to Participate  Level of consciousness: awake and alert and awake  Post-procedure vital signs: reviewed and stable  Pain management: adequate  Airway patency: patent    PONV status at discharge: No PONV  Anesthetic complications: no      Cardiovascular status: blood pressure returned to baseline  Respiratory status: unassisted, spontaneous ventilation and room air  Hydration status: euvolemic  Follow-up not needed.          Vitals Value Taken Time   /96 11/20/20 0811   Temp 36.9 °C (98.4 °F) 11/20/20 0811   Pulse 62 11/20/20 0811   Resp 18 11/20/20 0811   SpO2 96 % 11/20/20 0811         No case tracking events are documented in the log.      Pain/Alon Score: No data recorded

## 2020-11-20 NOTE — TRANSFER OF CARE
"Anesthesia Transfer of Care Note    Patient: Alessandro Olivares Jr.    Procedure(s) Performed: Procedure(s) (LRB):  EGD (ESOPHAGOGASTRODUODENOSCOPY) (N/A)  COLONOSCOPY (N/A)    Patient location: PACU    Anesthesia Type: MAC    Transport from OR: Transported from OR on room air with adequate spontaneous ventilation    Post pain: adequate analgesia    Post assessment: no apparent anesthetic complications    Post vital signs: stable    Level of consciousness: responds to stimulation    Nausea/Vomiting: no nausea/vomiting    Complications: none    Transfer of care protocol was followed      Last vitals:   Visit Vitals  BP (!) 136/96 (BP Location: Left arm)   Pulse 62   Temp 36.9 °C (98.4 °F) (Temporal)   Resp 18   Ht 6' 1" (1.854 m)   Wt 86.9 kg (191 lb 9.3 oz)   SpO2 96%   BMI 25.28 kg/m²     "

## 2020-11-20 NOTE — H&P
PRE PROCEDURE H&P    Patient Name: Alessandro Olivares Jr.  MRN: [id number]  : 1970  Date of Procedure:  2020  Referring Physician: Frederick Roper III, *  Primary Physician: Radha Golden MD  Procedure Physician: Tameka Burris MD       Planned Procedure: Colonoscopy and EGD  Diagnosis: screening for colon cancer  Nausea, vomiting  Chief Complaint: Same as above    HPI: Patient is an 50 y.o. male is here for the above.     Last colonoscopy: no prior   Family history: negative   Anticoagulation: none     Past Medical History:   Past Medical History:   Diagnosis Date    Allergy     Bleeding gastric ulcer     GERD (gastroesophageal reflux disease)     Hypertension     Migraine headache         Past Surgical History:  Past Surgical History:   Procedure Laterality Date    NASAL DILATION      TONSILLECTOMY          Home Medications:  Prior to Admission medications    Medication Sig Start Date End Date Taking? Authorizing Provider   meclizine (ANTIVERT) 25 mg tablet Take 1 tablet (25 mg total) by mouth 3 (three) times daily as needed for Dizziness. 20  Yes Jerome Reyes MD   chlorthalidone (HYGROTEN) 25 MG Tab Take 1 tablet (25 mg total) by mouth once daily. 9/14/20 10/14/20  Jerome Reyes MD   lansoprazole (PREVACID SOLUTAB) 15 MG disintegrating tablet Take 1 tablet (15 mg total) by mouth once daily. 20  Jerome Reyes MD   ondansetron (ZOFRAN) 4 MG tablet Take 1 tablet (4 mg total) by mouth every 6 (six) hours. 20   Patience Payne MD   ondansetron (ZOFRAN-ODT) 8 MG TbDL Take 1 tablet (8 mg total) by mouth every 6 (six) hours as needed. 20  Jerome Reyes MD   pantoprazole (PROTONIX) 40 MG tablet Take 1 tablet (40 mg total) by mouth once daily. 20  Patience Payne MD   sodium,potassium,mag sulfates (SUPREP BOWEL PREP KIT) 17.5-3.13-1.6 gram SolR As directed for colonoscopy 20   Frederick Roper III, MD   sucralfate (CARAFATE) 1 gram tablet Take 1  "tablet (1 g total) by mouth 4 (four) times daily. 11/5/20 12/5/20  Jerome Reyes MD        Allergies:  Review of patient's allergies indicates:   Allergen Reactions    Pollen extracts         Social History:   Social History     Socioeconomic History    Marital status:      Spouse name: Not on file    Number of children: Not on file    Years of education: Not on file    Highest education level: Not on file   Occupational History    Not on file   Social Needs    Financial resource strain: Not on file    Food insecurity     Worry: Not on file     Inability: Not on file    Transportation needs     Medical: Not on file     Non-medical: Not on file   Tobacco Use    Smoking status: Never Smoker    Smokeless tobacco: Never Used   Substance and Sexual Activity    Alcohol use: No    Drug use: No    Sexual activity: Not on file   Lifestyle    Physical activity     Days per week: Not on file     Minutes per session: Not on file    Stress: Not on file   Relationships    Social connections     Talks on phone: Not on file     Gets together: Not on file     Attends [employer] service: Not on file     Active member of club or organization: Not on file     Attends meetings of clubs or organizations: Not on file     Relationship status: Not on file   Other Topics Concern    Not on file   Social History Narrative    Not on file       Family History:  Family History   Problem Relation Age of Onset    Diabetes Mother     Heart attack Mother     Hypertension Father        ROS: No acute cardiac events, no acute respiratory complaints.     Physical Exam (all patients):    BP (!) 136/96 (BP Location: Left arm)   Pulse 62   Temp 98.4 °F (36.9 °C) (Temporal)   Resp 18   Ht 6' 1" (1.854 m)   Wt 86.9 kg (191 lb 9.3 oz)   SpO2 96%   BMI 25.28 kg/m²   Lungs: Clear to auscultation bilaterally, respirations unlabored  Heart: Regular rate and rhythm, S1 and S2 normal, no obvious murmurs  Abdomen:         Soft, " non-tender, bowel sounds normal, no masses, no organomegaly    Lab Results   Component Value Date    WBC 13.25 (H) 11/05/2020    MCV 91 11/05/2020    RDW 15.1 (H) 11/05/2020     11/05/2020    INR 1.0 11/05/2020    GLU 84 11/05/2020    HGBA1C 5.7 11/22/2016    BUN 20 11/05/2020     11/05/2020    K 3.8 11/05/2020     11/05/2020        SEDATION PLAN: per anesthesia      History reviewed, vital signs satisfactory, cardiopulmonary status satisfactory, sedation options, risks and plans have been discussed with the patient  All their questions were answered and the patient agrees to the sedation procedures as planned and the patient is deemed an appropriate candidate for the sedation as planned.    Procedure explained to patient, informed consent obtained and placed in chart.    Tameka Burris  11/20/2020  8:27 AM

## 2020-11-20 NOTE — DISCHARGE INSTRUCTIONS
Duodenitis    The duodenum is the first part of the small intestine, just past the stomach. Duodenitis is inflammation of the lining of the duodenum. This sheet tells you more about the condition.  Causes of duodenitis  The most common cause of duodenitis is infection by Helicobacter pylori (H. pylori) bacteria. Another common cause is long-term use of NSAIDs (such as aspirin and ibuprofen). Celiac disease, an allergy to gluten, causes a particular type of inflammation in the duodenum along with other changes. Less commonly, duodenitis happens along with another health problem, such as Crohn's disease. Drinking alcohol, smoking, or taking certain medicines also may make duodenitis more likely to happen.   Symptoms of duodenitis  The condition may cause no symptoms. If symptoms do happen, they can include:  · Burning, cramping, or hunger-like pain in your stomach  · Gas or a bloated feeling  · Nausea and vomiting  · Feeling full soon after starting a meal  Diagnosing duodenitis  Here is what will be done to diagnose duodenitis:  · If duodenitis is suspected, an upper endoscopy with biopsy will be done to confirm it. During this test, a thin, flexible tube with a light and camera on the end (endoscope) is used. The scope is moved down your throat to the stomach and into the duodenum. The scope sends images of the duodenum to a video screen. Small samples (biopsy) of the lining of the duodenum may be taken. These samples may be sent to a lab for testing for H. pylori.  · To check for H. pylori or other pathogens, a blood, stool, gastric biopsy, or breath test may be done. Samples of blood or stool are taken and tested in a lab. For a breath test, you swallow a harmless compound. If H. pylori bacteria are present, extra carbon dioxide gas can then be detected in your breath.  · To check for celiac disease, blood tests may be done. If positive, an upper endoscopy with biopsy is usually done to confirm the  diagnosis.   · In rare cases, an upper gastrointestinal (GI) series is done. This gives more information about the digestive tract. This procedure takes X-rays of the upper GI tract from the mouth to the small intestine.  Treating duodenitis  Duodenitis is treated using one or more of the following:  · Antibiotic medicines to kill H. pylori  · Medicines to reduce the amount of acid the stomach makes  · Stopping NSAIDs such as aspirin and ibuprofen. However, if you take aspirin for a medical condition, such as heart disease or stroke, do not stop until you check with your prescribing healthcare provider. If you take NSAIDs for arthritis or pain, check with your healthcare provider about alternatives.  · Adopting a gluten-free diet if celiac disease is the cause.  · Avoiding alcohol  · Stopping smoking  Your healthcare provider can tell you more about what treatments are needed.  Recovery and follow-up  With treatment, most cases of duodenitis clear up completely. In rare cases, duodenitis can be an ongoing (chronic) problem or can develop into a duodenal ulcer. If your symptoms do not improve or if they go away and come back, let your healthcare provider know. In such cases, regular healthcare provider visits and treatments are needed to manage your condition.   When to call the healthcare provider  Call your healthcare provider right away if you have any of the following:  · Fever of 100.4°F (38.0°C) or higher, or as advised by your healthcare provider  · Nausea or vomiting (vomit may be bloody or look like coffee grounds)  · Dark, tarry, or bloody stools  · Sudden or severe stomach pain  · Pain that does not improve with treatment  · Rapid weight loss   Date Last Reviewed: 7/1/2016  © 5955-4706 [employer]. [address] [place] [address], [place] [address]. All rights reserved. This information is not intended as a substitute for professional medical care. Always follow your healthcare professional's  instructions.        Gastritis (Adult)    Gastritis is inflammation and irritation of the stomach lining. It can be present for a short time (acute) or be long lasting (chronic). Gastritis is often caused by infection with bacteria called H pylori. More than a third of people in the [place] have this bacteria in their bodies. In many cases, H pylori causes no problems or symptoms. In some people, though, the infection irritates the stomach lining and causes gastritis. Other causes of stomach irritation include drinking alcohol or taking pain-relieving medicines called NSAIDs (such as aspirin or ibuprofen).   Symptoms of gastritis can include:  · Abdominal pain or bloating  · Loss of appetite  · Nausea or vomiting  · Vomiting blood or having black stools  · Feeling more tired than usual  An inflamed and irritated stomach lining is more likely to develop a sore called an ulcer. To help prevent this, gastritis should be treated.  Home care  If needed, medicines may be prescribed. If you have H pylori infection, treating it will likely relieve your symptoms. Other changes can help reduce stomach irritation and help it heal.  · If you have been prescribed medicines for H pylori infection, take them as directed. Take all of the medicine until it is finished or your healthcare provider tells you to stop, even if you feel better.  · Your healthcare provider may recommend avoiding NSAIDs. If you take daily aspirin for your heart or other medical reasons, do not stop without talking to your healthcare provider first.  · Avoid drinking alcohol.  · Stop smoking. Smoking can irritate the stomach and delay healing. As much as possible, stay away from second hand smoke.  Follow-up care  Follow up with your healthcare provider, or as advised by our staff. Testing may be needed to check for inflammation or an ulcer.  When to seek medical advice  Call your healthcare provider for any of the following:  · Stomach pain that gets worse or  moves to the lower right abdomen (appendix area)  · Chest pain that appears or gets worse, or spreads to the back, neck, shoulder, or arm  · Frequent vomiting (cant keep down liquids)  · Blood in the stool or vomit (red or black in color)  · Feeling weak or dizzy  · Fever of 100.4ºF (38ºC) or higher, or as directed by your healthcare provider  Date Last Reviewed: 6/22/2015  © 8273-3177 [employer]. [address] [place] [address]. All rights reserved. This information is not intended as a substitute for professional medical care. Always follow your healthcare professional's instructions.

## 2020-11-23 ENCOUNTER — TELEPHONE (OUTPATIENT)
Dept: ENDOSCOPY | Facility: HOSPITAL | Age: 50
End: 2020-11-23

## 2020-11-29 LAB
FINAL PATHOLOGIC DIAGNOSIS: NORMAL
GROSS: NORMAL
Lab: NORMAL

## 2020-12-02 DIAGNOSIS — A04.8 H. PYLORI INFECTION: Primary | ICD-10-CM

## 2020-12-02 RX ORDER — METRONIDAZOLE 250 MG/1
250 TABLET ORAL 4 TIMES DAILY
Qty: 56 TABLET | Refills: 0 | Status: SHIPPED | OUTPATIENT
Start: 2020-12-02 | End: 2020-12-16

## 2020-12-02 RX ORDER — PANTOPRAZOLE SODIUM 40 MG/1
40 TABLET, DELAYED RELEASE ORAL 2 TIMES DAILY
Qty: 28 TABLET | Refills: 0 | Status: SHIPPED | OUTPATIENT
Start: 2020-12-02 | End: 2020-12-16

## 2020-12-02 RX ORDER — TETRACYCLINE HYDROCHLORIDE 500 MG/1
500 CAPSULE ORAL 4 TIMES DAILY
Qty: 56 CAPSULE | Refills: 0 | Status: SHIPPED | OUTPATIENT
Start: 2020-12-02 | End: 2020-12-16

## 2020-12-12 ENCOUNTER — PATIENT MESSAGE (OUTPATIENT)
Dept: INTERNAL MEDICINE | Facility: CLINIC | Age: 50
End: 2020-12-12

## 2021-01-25 ENCOUNTER — OFFICE VISIT (OUTPATIENT)
Dept: OPHTHALMOLOGY | Facility: CLINIC | Age: 51
End: 2021-01-25
Payer: COMMERCIAL

## 2021-01-25 DIAGNOSIS — I10 ESSENTIAL HYPERTENSION: Primary | ICD-10-CM

## 2021-01-25 DIAGNOSIS — H52.7 REFRACTIVE ERRORS: ICD-10-CM

## 2021-01-25 PROCEDURE — 92004 COMPRE OPH EXAM NEW PT 1/>: CPT | Mod: S$GLB,,, | Performed by: OPTOMETRIST

## 2021-01-25 PROCEDURE — 92004 PR EYE EXAM, NEW PATIENT,COMPREHESV: ICD-10-PCS | Mod: S$GLB,,, | Performed by: OPTOMETRIST

## 2021-01-25 PROCEDURE — 99999 PR PBB SHADOW E&M-EST. PATIENT-LVL II: CPT | Mod: PBBFAC,,, | Performed by: OPTOMETRIST

## 2021-01-25 PROCEDURE — 99999 PR PBB SHADOW E&M-EST. PATIENT-LVL II: ICD-10-PCS | Mod: PBBFAC,,, | Performed by: OPTOMETRIST

## 2021-01-25 PROCEDURE — 92015 PR REFRACTION: ICD-10-PCS | Mod: S$GLB,,, | Performed by: OPTOMETRIST

## 2021-01-25 PROCEDURE — 92015 DETERMINE REFRACTIVE STATE: CPT | Mod: S$GLB,,, | Performed by: OPTOMETRIST

## 2021-02-04 ENCOUNTER — OFFICE VISIT (OUTPATIENT)
Dept: INTERNAL MEDICINE | Facility: CLINIC | Age: 51
End: 2021-02-04
Payer: COMMERCIAL

## 2021-02-04 ENCOUNTER — PATIENT MESSAGE (OUTPATIENT)
Dept: INTERNAL MEDICINE | Facility: CLINIC | Age: 51
End: 2021-02-04

## 2021-02-04 VITALS
BODY MASS INDEX: 25.65 KG/M2 | TEMPERATURE: 99 F | HEART RATE: 94 BPM | HEIGHT: 73 IN | WEIGHT: 193.56 LBS | DIASTOLIC BLOOD PRESSURE: 80 MMHG | OXYGEN SATURATION: 96 % | SYSTOLIC BLOOD PRESSURE: 122 MMHG

## 2021-02-04 DIAGNOSIS — M54.31 SCIATIC NERVE PAIN, RIGHT: Primary | ICD-10-CM

## 2021-02-04 PROCEDURE — 96372 PR INJECTION,THERAP/PROPH/DIAG2ST, IM OR SUBCUT: ICD-10-PCS | Mod: S$GLB,,, | Performed by: PHYSICIAN ASSISTANT

## 2021-02-04 PROCEDURE — 1125F PR PAIN SEVERITY QUANTIFIED, PAIN PRESENT: ICD-10-PCS | Mod: S$GLB,,, | Performed by: PHYSICIAN ASSISTANT

## 2021-02-04 PROCEDURE — 3074F SYST BP LT 130 MM HG: CPT | Mod: CPTII,S$GLB,, | Performed by: PHYSICIAN ASSISTANT

## 2021-02-04 PROCEDURE — 99999 PR PBB SHADOW E&M-EST. PATIENT-LVL IV: CPT | Mod: PBBFAC,,, | Performed by: PHYSICIAN ASSISTANT

## 2021-02-04 PROCEDURE — 3079F PR MOST RECENT DIASTOLIC BLOOD PRESSURE 80-89 MM HG: ICD-10-PCS | Mod: CPTII,S$GLB,, | Performed by: PHYSICIAN ASSISTANT

## 2021-02-04 PROCEDURE — 1125F AMNT PAIN NOTED PAIN PRSNT: CPT | Mod: S$GLB,,, | Performed by: PHYSICIAN ASSISTANT

## 2021-02-04 PROCEDURE — 99214 OFFICE O/P EST MOD 30 MIN: CPT | Mod: 25,S$GLB,, | Performed by: PHYSICIAN ASSISTANT

## 2021-02-04 PROCEDURE — 3079F DIAST BP 80-89 MM HG: CPT | Mod: CPTII,S$GLB,, | Performed by: PHYSICIAN ASSISTANT

## 2021-02-04 PROCEDURE — 99214 PR OFFICE/OUTPT VISIT, EST, LEVL IV, 30-39 MIN: ICD-10-PCS | Mod: 25,S$GLB,, | Performed by: PHYSICIAN ASSISTANT

## 2021-02-04 PROCEDURE — 3008F BODY MASS INDEX DOCD: CPT | Mod: CPTII,S$GLB,, | Performed by: PHYSICIAN ASSISTANT

## 2021-02-04 PROCEDURE — 3008F PR BODY MASS INDEX (BMI) DOCUMENTED: ICD-10-PCS | Mod: CPTII,S$GLB,, | Performed by: PHYSICIAN ASSISTANT

## 2021-02-04 PROCEDURE — 96372 THER/PROPH/DIAG INJ SC/IM: CPT | Mod: S$GLB,,, | Performed by: PHYSICIAN ASSISTANT

## 2021-02-04 PROCEDURE — 3074F PR MOST RECENT SYSTOLIC BLOOD PRESSURE < 130 MM HG: ICD-10-PCS | Mod: CPTII,S$GLB,, | Performed by: PHYSICIAN ASSISTANT

## 2021-02-04 PROCEDURE — 99999 PR PBB SHADOW E&M-EST. PATIENT-LVL IV: ICD-10-PCS | Mod: PBBFAC,,, | Performed by: PHYSICIAN ASSISTANT

## 2021-02-04 RX ORDER — METHYLPREDNISOLONE ACETATE 40 MG/ML
80 INJECTION, SUSPENSION INTRA-ARTICULAR; INTRALESIONAL; INTRAMUSCULAR; SOFT TISSUE ONCE
Status: COMPLETED | OUTPATIENT
Start: 2021-02-04 | End: 2021-02-04

## 2021-02-04 RX ORDER — METHYLPREDNISOLONE ACETATE 80 MG/ML
80 INJECTION, SUSPENSION INTRA-ARTICULAR; INTRALESIONAL; INTRAMUSCULAR; SOFT TISSUE ONCE
Status: DISCONTINUED | OUTPATIENT
Start: 2021-02-04 | End: 2021-02-04

## 2021-02-04 RX ORDER — METHOCARBAMOL 750 MG/1
750 TABLET, FILM COATED ORAL 3 TIMES DAILY
Qty: 20 TABLET | Refills: 0 | Status: SHIPPED | OUTPATIENT
Start: 2021-02-04 | End: 2021-02-11

## 2021-02-04 RX ORDER — NAPROXEN 500 MG/1
500 TABLET ORAL 2 TIMES DAILY PRN
Qty: 14 TABLET | Refills: 0 | Status: SHIPPED | OUTPATIENT
Start: 2021-02-04 | End: 2021-02-11

## 2021-02-04 RX ADMIN — METHYLPREDNISOLONE ACETATE 80 MG: 40 INJECTION, SUSPENSION INTRA-ARTICULAR; INTRALESIONAL; INTRAMUSCULAR; SOFT TISSUE at 12:02

## 2021-02-08 ENCOUNTER — OFFICE VISIT (OUTPATIENT)
Dept: INTERNAL MEDICINE | Facility: CLINIC | Age: 51
End: 2021-02-08
Payer: COMMERCIAL

## 2021-02-08 ENCOUNTER — HOSPITAL ENCOUNTER (OUTPATIENT)
Dept: RADIOLOGY | Facility: HOSPITAL | Age: 51
Discharge: HOME | End: 2021-02-08
Attending: PHYSICIAN ASSISTANT
Payer: COMMERCIAL

## 2021-02-08 VITALS
TEMPERATURE: 99 F | OXYGEN SATURATION: 97 % | SYSTOLIC BLOOD PRESSURE: 158 MMHG | DIASTOLIC BLOOD PRESSURE: 92 MMHG | BODY MASS INDEX: 26.35 KG/M2 | WEIGHT: 199.75 LBS | HEART RATE: 68 BPM

## 2021-02-08 DIAGNOSIS — I10 ESSENTIAL HYPERTENSION: Primary | ICD-10-CM

## 2021-02-08 DIAGNOSIS — M54.9 DORSALGIA, UNSPECIFIED: ICD-10-CM

## 2021-02-08 DIAGNOSIS — M54.31 SCIATICA OF RIGHT SIDE: ICD-10-CM

## 2021-02-08 PROCEDURE — 3080F PR MOST RECENT DIASTOLIC BLOOD PRESSURE >= 90 MM HG: ICD-10-PCS | Mod: CPTII,S$GLB,, | Performed by: PHYSICIAN ASSISTANT

## 2021-02-08 PROCEDURE — 3077F PR MOST RECENT SYSTOLIC BLOOD PRESSURE >= 140 MM HG: ICD-10-PCS | Mod: CPTII,S$GLB,, | Performed by: PHYSICIAN ASSISTANT

## 2021-02-08 PROCEDURE — 3080F DIAST BP >= 90 MM HG: CPT | Mod: CPTII,S$GLB,, | Performed by: PHYSICIAN ASSISTANT

## 2021-02-08 PROCEDURE — 1125F PR PAIN SEVERITY QUANTIFIED, PAIN PRESENT: ICD-10-PCS | Mod: S$GLB,,, | Performed by: PHYSICIAN ASSISTANT

## 2021-02-08 PROCEDURE — 99999 PR PBB SHADOW E&M-EST. PATIENT-LVL IV: CPT | Mod: PBBFAC,,, | Performed by: PHYSICIAN ASSISTANT

## 2021-02-08 PROCEDURE — 3008F BODY MASS INDEX DOCD: CPT | Mod: CPTII,S$GLB,, | Performed by: PHYSICIAN ASSISTANT

## 2021-02-08 PROCEDURE — 1125F AMNT PAIN NOTED PAIN PRSNT: CPT | Mod: S$GLB,,, | Performed by: PHYSICIAN ASSISTANT

## 2021-02-08 PROCEDURE — 99214 PR OFFICE/OUTPT VISIT, EST, LEVL IV, 30-39 MIN: ICD-10-PCS | Mod: S$GLB,,, | Performed by: PHYSICIAN ASSISTANT

## 2021-02-08 PROCEDURE — 72110 X-RAY EXAM L-2 SPINE 4/>VWS: CPT | Mod: TC

## 2021-02-08 PROCEDURE — 72110 X-RAY EXAM L-2 SPINE 4/>VWS: CPT | Mod: 26,,, | Performed by: RADIOLOGY

## 2021-02-08 PROCEDURE — 3008F PR BODY MASS INDEX (BMI) DOCUMENTED: ICD-10-PCS | Mod: CPTII,S$GLB,, | Performed by: PHYSICIAN ASSISTANT

## 2021-02-08 PROCEDURE — 99999 PR PBB SHADOW E&M-EST. PATIENT-LVL IV: ICD-10-PCS | Mod: PBBFAC,,, | Performed by: PHYSICIAN ASSISTANT

## 2021-02-08 PROCEDURE — 99214 OFFICE O/P EST MOD 30 MIN: CPT | Mod: S$GLB,,, | Performed by: PHYSICIAN ASSISTANT

## 2021-02-08 PROCEDURE — 72110 XR LUMBAR SPINE COMPLETE 5 VIEW: ICD-10-PCS | Mod: 26,,, | Performed by: RADIOLOGY

## 2021-02-08 PROCEDURE — 3077F SYST BP >= 140 MM HG: CPT | Mod: CPTII,S$GLB,, | Performed by: PHYSICIAN ASSISTANT

## 2021-02-08 RX ORDER — TETRACYCLINE HYDROCHLORIDE 500 MG/1
CAPSULE ORAL
COMMUNITY
Start: 2020-12-28 | End: 2021-02-08 | Stop reason: ALTCHOICE

## 2021-02-08 RX ORDER — MELOXICAM 7.5 MG/1
TABLET ORAL
Qty: 30 TABLET | Refills: 0 | Status: SHIPPED | OUTPATIENT
Start: 2021-02-08 | End: 2021-05-03

## 2021-02-08 RX ORDER — METRONIDAZOLE 250 MG/1
TABLET ORAL
COMMUNITY
Start: 2020-12-28 | End: 2021-02-08 | Stop reason: ALTCHOICE

## 2021-02-09 ENCOUNTER — CLINICAL SUPPORT (OUTPATIENT)
Dept: REHABILITATION | Facility: HOSPITAL | Age: 51
End: 2021-02-09
Payer: COMMERCIAL

## 2021-02-09 ENCOUNTER — OFFICE VISIT (OUTPATIENT)
Dept: PAIN MEDICINE | Facility: CLINIC | Age: 51
End: 2021-02-09
Payer: COMMERCIAL

## 2021-02-09 VITALS
WEIGHT: 194.25 LBS | HEART RATE: 71 BPM | BODY MASS INDEX: 25.74 KG/M2 | DIASTOLIC BLOOD PRESSURE: 83 MMHG | HEIGHT: 73 IN | SYSTOLIC BLOOD PRESSURE: 131 MMHG

## 2021-02-09 DIAGNOSIS — M54.9 DORSALGIA, UNSPECIFIED: ICD-10-CM

## 2021-02-09 DIAGNOSIS — M62.81 PROXIMAL MUSCLE WEAKNESS: ICD-10-CM

## 2021-02-09 DIAGNOSIS — R26.9 GAIT ABNORMALITY: ICD-10-CM

## 2021-02-09 DIAGNOSIS — M54.16 LUMBAR RADICULOPATHY: Primary | ICD-10-CM

## 2021-02-09 DIAGNOSIS — M53.86 DECREASED RANGE OF MOTION OF LUMBAR SPINE: ICD-10-CM

## 2021-02-09 DIAGNOSIS — R29.898 RIGHT LEG WEAKNESS: ICD-10-CM

## 2021-02-09 DIAGNOSIS — M47.816 LUMBAR SPONDYLOSIS: ICD-10-CM

## 2021-02-09 PROCEDURE — 1125F PR PAIN SEVERITY QUANTIFIED, PAIN PRESENT: ICD-10-PCS | Mod: S$GLB,,, | Performed by: ANESTHESIOLOGY

## 2021-02-09 PROCEDURE — 3008F PR BODY MASS INDEX (BMI) DOCUMENTED: ICD-10-PCS | Mod: CPTII,S$GLB,, | Performed by: ANESTHESIOLOGY

## 2021-02-09 PROCEDURE — 99244 OFF/OP CNSLTJ NEW/EST MOD 40: CPT | Mod: S$GLB,,, | Performed by: ANESTHESIOLOGY

## 2021-02-09 PROCEDURE — 99999 PR PBB SHADOW E&M-EST. PATIENT-LVL III: CPT | Mod: PBBFAC,,, | Performed by: ANESTHESIOLOGY

## 2021-02-09 PROCEDURE — 99244 PR OFFICE CONSULTATION,LEVEL IV: ICD-10-PCS | Mod: S$GLB,,, | Performed by: ANESTHESIOLOGY

## 2021-02-09 PROCEDURE — 97162 PT EVAL MOD COMPLEX 30 MIN: CPT

## 2021-02-09 PROCEDURE — 1125F AMNT PAIN NOTED PAIN PRSNT: CPT | Mod: S$GLB,,, | Performed by: ANESTHESIOLOGY

## 2021-02-09 PROCEDURE — 99999 PR PBB SHADOW E&M-EST. PATIENT-LVL III: ICD-10-PCS | Mod: PBBFAC,,, | Performed by: ANESTHESIOLOGY

## 2021-02-09 PROCEDURE — 3008F BODY MASS INDEX DOCD: CPT | Mod: CPTII,S$GLB,, | Performed by: ANESTHESIOLOGY

## 2021-02-09 PROCEDURE — 97110 THERAPEUTIC EXERCISES: CPT

## 2021-02-09 RX ORDER — GABAPENTIN 300 MG/1
CAPSULE ORAL
Qty: 90 CAPSULE | Refills: 0 | Status: SHIPPED | OUTPATIENT
Start: 2021-02-09 | End: 2021-05-03 | Stop reason: SDUPTHER

## 2021-02-11 ENCOUNTER — TELEPHONE (OUTPATIENT)
Dept: PAIN MEDICINE | Facility: CLINIC | Age: 51
End: 2021-02-11

## 2021-02-11 ENCOUNTER — CLINICAL SUPPORT (OUTPATIENT)
Dept: REHABILITATION | Facility: HOSPITAL | Age: 51
End: 2021-02-11
Payer: COMMERCIAL

## 2021-02-11 ENCOUNTER — DOCUMENTATION ONLY (OUTPATIENT)
Dept: REHABILITATION | Facility: HOSPITAL | Age: 51
End: 2021-02-11

## 2021-02-11 DIAGNOSIS — M62.81 PROXIMAL MUSCLE WEAKNESS: ICD-10-CM

## 2021-02-11 DIAGNOSIS — M53.86 DECREASED RANGE OF MOTION OF LUMBAR SPINE: ICD-10-CM

## 2021-02-11 DIAGNOSIS — R26.9 GAIT ABNORMALITY: ICD-10-CM

## 2021-02-11 PROCEDURE — 97140 MANUAL THERAPY 1/> REGIONS: CPT | Mod: CQ

## 2021-02-11 PROCEDURE — 97110 THERAPEUTIC EXERCISES: CPT | Mod: CQ

## 2021-02-12 PROBLEM — R26.9 GAIT ABNORMALITY: Status: ACTIVE | Noted: 2021-02-12

## 2021-02-12 PROBLEM — M62.81 PROXIMAL MUSCLE WEAKNESS: Status: ACTIVE | Noted: 2021-02-12

## 2021-02-12 PROBLEM — M53.86 DECREASED RANGE OF MOTION OF LUMBAR SPINE: Status: ACTIVE | Noted: 2021-02-12

## 2021-02-17 ENCOUNTER — PATIENT MESSAGE (OUTPATIENT)
Dept: PAIN MEDICINE | Facility: HOSPITAL | Age: 51
End: 2021-02-17

## 2021-02-17 ENCOUNTER — HOSPITAL ENCOUNTER (OUTPATIENT)
Dept: RADIOLOGY | Facility: HOSPITAL | Age: 51
Discharge: HOME | End: 2021-02-17
Attending: ANESTHESIOLOGY
Payer: COMMERCIAL

## 2021-02-17 DIAGNOSIS — M54.9 DORSALGIA, UNSPECIFIED: ICD-10-CM

## 2021-02-17 PROCEDURE — 72148 MRI LUMBAR SPINE WITHOUT CONTRAST: ICD-10-PCS | Mod: 26,,, | Performed by: RADIOLOGY

## 2021-02-17 PROCEDURE — 72148 MRI LUMBAR SPINE W/O DYE: CPT | Mod: 26,,, | Performed by: RADIOLOGY

## 2021-02-17 PROCEDURE — 72148 MRI LUMBAR SPINE W/O DYE: CPT | Mod: TC,PO

## 2021-02-19 ENCOUNTER — CLINICAL SUPPORT (OUTPATIENT)
Dept: REHABILITATION | Facility: HOSPITAL | Age: 51
End: 2021-02-19
Payer: COMMERCIAL

## 2021-02-19 ENCOUNTER — PATIENT MESSAGE (OUTPATIENT)
Dept: PAIN MEDICINE | Facility: HOSPITAL | Age: 51
End: 2021-02-19

## 2021-02-19 DIAGNOSIS — M62.81 PROXIMAL MUSCLE WEAKNESS: ICD-10-CM

## 2021-02-19 DIAGNOSIS — R26.9 GAIT ABNORMALITY: ICD-10-CM

## 2021-02-19 DIAGNOSIS — M53.86 DECREASED RANGE OF MOTION OF LUMBAR SPINE: ICD-10-CM

## 2021-02-19 PROCEDURE — 97140 MANUAL THERAPY 1/> REGIONS: CPT

## 2021-02-19 PROCEDURE — 97110 THERAPEUTIC EXERCISES: CPT

## 2021-02-22 ENCOUNTER — CLINICAL SUPPORT (OUTPATIENT)
Dept: REHABILITATION | Facility: HOSPITAL | Age: 51
End: 2021-02-22
Payer: COMMERCIAL

## 2021-02-22 DIAGNOSIS — M53.86 DECREASED RANGE OF MOTION OF LUMBAR SPINE: ICD-10-CM

## 2021-02-22 DIAGNOSIS — R26.9 GAIT ABNORMALITY: ICD-10-CM

## 2021-02-22 DIAGNOSIS — M62.81 PROXIMAL MUSCLE WEAKNESS: ICD-10-CM

## 2021-02-22 PROCEDURE — 97112 NEUROMUSCULAR REEDUCATION: CPT

## 2021-02-22 PROCEDURE — 97110 THERAPEUTIC EXERCISES: CPT

## 2021-02-22 PROCEDURE — 97140 MANUAL THERAPY 1/> REGIONS: CPT

## 2021-02-24 ENCOUNTER — PATIENT MESSAGE (OUTPATIENT)
Dept: PAIN MEDICINE | Facility: HOSPITAL | Age: 51
End: 2021-02-24

## 2021-02-24 ENCOUNTER — TELEPHONE (OUTPATIENT)
Dept: PAIN MEDICINE | Facility: CLINIC | Age: 51
End: 2021-02-24

## 2021-02-25 ENCOUNTER — HOSPITAL ENCOUNTER (OUTPATIENT)
Facility: HOSPITAL | Age: 51
Discharge: HOME | End: 2021-02-25
Attending: ANESTHESIOLOGY | Admitting: ANESTHESIOLOGY
Payer: COMMERCIAL

## 2021-02-25 VITALS
DIASTOLIC BLOOD PRESSURE: 91 MMHG | HEIGHT: 73 IN | RESPIRATION RATE: 16 BRPM | BODY MASS INDEX: 25.16 KG/M2 | OXYGEN SATURATION: 99 % | HEART RATE: 67 BPM | SYSTOLIC BLOOD PRESSURE: 145 MMHG | WEIGHT: 189.81 LBS | TEMPERATURE: 98 F

## 2021-02-25 DIAGNOSIS — M54.16 LUMBAR RADICULOPATHY: Primary | ICD-10-CM

## 2021-02-25 PROCEDURE — 64484 PRA INJECT ANES/STEROID FORAMEN LUMBAR/SACRAL W IMG GUIDE ,EA ADD LEVEL: ICD-10-PCS | Mod: RT,,, | Performed by: ANESTHESIOLOGY

## 2021-02-25 PROCEDURE — 64484 NJX AA&/STRD TFRM EPI L/S EA: CPT | Mod: RT,,, | Performed by: ANESTHESIOLOGY

## 2021-02-25 PROCEDURE — 63600175 PHARM REV CODE 636 W HCPCS: Performed by: ANESTHESIOLOGY

## 2021-02-25 PROCEDURE — 25500020 PHARM REV CODE 255: Performed by: ANESTHESIOLOGY

## 2021-02-25 PROCEDURE — 25000003 PHARM REV CODE 250: Performed by: ANESTHESIOLOGY

## 2021-02-25 PROCEDURE — 64484 NJX AA&/STRD TFRM EPI L/S EA: CPT | Performed by: ANESTHESIOLOGY

## 2021-02-25 PROCEDURE — 64483 NJX AA&/STRD TFRM EPI L/S 1: CPT | Performed by: ANESTHESIOLOGY

## 2021-02-25 PROCEDURE — 64483 NJX AA&/STRD TFRM EPI L/S 1: CPT | Mod: RT,,, | Performed by: ANESTHESIOLOGY

## 2021-02-25 PROCEDURE — 64483 PR EPIDURAL INJ, ANES/STEROID, TRANSFORAMINAL, LUMB/SACR, SNGL LEVL: ICD-10-PCS | Mod: RT,,, | Performed by: ANESTHESIOLOGY

## 2021-02-25 PROCEDURE — 99152 MOD SED SAME PHYS/QHP 5/>YRS: CPT | Performed by: ANESTHESIOLOGY

## 2021-02-25 RX ORDER — DEXAMETHASONE SODIUM PHOSPHATE 100 MG/10ML
INJECTION INTRAMUSCULAR; INTRAVENOUS
Status: DISCONTINUED | OUTPATIENT
Start: 2021-02-25 | End: 2021-02-25 | Stop reason: HOSPADM

## 2021-02-25 RX ORDER — MIDAZOLAM HYDROCHLORIDE 1 MG/ML
INJECTION, SOLUTION INTRAMUSCULAR; INTRAVENOUS
Status: DISCONTINUED | OUTPATIENT
Start: 2021-02-25 | End: 2021-02-25 | Stop reason: HOSPADM

## 2021-02-25 RX ORDER — FENTANYL CITRATE 50 UG/ML
INJECTION, SOLUTION INTRAMUSCULAR; INTRAVENOUS
Status: DISCONTINUED | OUTPATIENT
Start: 2021-02-25 | End: 2021-02-25 | Stop reason: HOSPADM

## 2021-02-25 RX ORDER — LIDOCAINE HYDROCHLORIDE 10 MG/ML
INJECTION, SOLUTION EPIDURAL; INFILTRATION; INTRACAUDAL; PERINEURAL
Status: DISCONTINUED | OUTPATIENT
Start: 2021-02-25 | End: 2021-02-25 | Stop reason: HOSPADM

## 2021-02-28 ENCOUNTER — NURSE TRIAGE (OUTPATIENT)
Dept: ADMINISTRATIVE | Facility: CLINIC | Age: 51
End: 2021-02-28

## 2021-03-01 ENCOUNTER — OFFICE VISIT (OUTPATIENT)
Dept: FAMILY MEDICINE | Facility: CLINIC | Age: 51
End: 2021-03-01
Payer: COMMERCIAL

## 2021-03-01 ENCOUNTER — PATIENT MESSAGE (OUTPATIENT)
Dept: INTERNAL MEDICINE | Facility: CLINIC | Age: 51
End: 2021-03-01

## 2021-03-01 ENCOUNTER — NURSE TRIAGE (OUTPATIENT)
Dept: ADMINISTRATIVE | Facility: CLINIC | Age: 51
End: 2021-03-01

## 2021-03-01 DIAGNOSIS — Z20.822 EXPOSURE TO COVID-19 VIRUS: ICD-10-CM

## 2021-03-01 DIAGNOSIS — B96.89 BACTERIAL LOWER RESPIRATORY INFECTION: Primary | ICD-10-CM

## 2021-03-01 DIAGNOSIS — J22 BACTERIAL LOWER RESPIRATORY INFECTION: Primary | ICD-10-CM

## 2021-03-01 PROCEDURE — 99213 OFFICE O/P EST LOW 20 MIN: CPT | Mod: 95,,, | Performed by: FAMILY MEDICINE

## 2021-03-01 PROCEDURE — 99213 PR OFFICE/OUTPT VISIT, EST, LEVL III, 20-29 MIN: ICD-10-PCS | Mod: 95,,, | Performed by: FAMILY MEDICINE

## 2021-03-02 ENCOUNTER — PATIENT MESSAGE (OUTPATIENT)
Dept: FAMILY MEDICINE | Facility: CLINIC | Age: 51
End: 2021-03-02

## 2021-03-02 RX ORDER — BENZONATATE 200 MG/1
200 CAPSULE ORAL 3 TIMES DAILY PRN
Qty: 21 CAPSULE | Refills: 0 | Status: SHIPPED | OUTPATIENT
Start: 2021-03-02 | End: 2021-03-12

## 2021-03-02 RX ORDER — AZITHROMYCIN 500 MG/1
500 TABLET, FILM COATED ORAL DAILY
Qty: 5 TABLET | Refills: 0 | Status: SHIPPED | OUTPATIENT
Start: 2021-03-02 | End: 2021-03-07

## 2021-03-15 ENCOUNTER — CLINICAL SUPPORT (OUTPATIENT)
Dept: REHABILITATION | Facility: HOSPITAL | Age: 51
End: 2021-03-15
Payer: COMMERCIAL

## 2021-03-15 DIAGNOSIS — M62.81 PROXIMAL MUSCLE WEAKNESS: ICD-10-CM

## 2021-03-15 DIAGNOSIS — M53.86 DECREASED RANGE OF MOTION OF LUMBAR SPINE: ICD-10-CM

## 2021-03-15 DIAGNOSIS — R26.9 GAIT ABNORMALITY: ICD-10-CM

## 2021-03-15 PROCEDURE — 97110 THERAPEUTIC EXERCISES: CPT

## 2021-03-15 PROCEDURE — 97112 NEUROMUSCULAR REEDUCATION: CPT

## 2021-03-19 ENCOUNTER — PATIENT MESSAGE (OUTPATIENT)
Dept: PAIN MEDICINE | Facility: CLINIC | Age: 51
End: 2021-03-19

## 2021-03-19 ENCOUNTER — PATIENT MESSAGE (OUTPATIENT)
Dept: INTERNAL MEDICINE | Facility: CLINIC | Age: 51
End: 2021-03-19

## 2021-03-19 RX ORDER — METHOCARBAMOL 750 MG/1
750 TABLET, FILM COATED ORAL 2 TIMES DAILY PRN
Qty: 60 TABLET | Refills: 0 | Status: SHIPPED | OUTPATIENT
Start: 2021-03-19 | End: 2021-05-03

## 2021-03-22 ENCOUNTER — CLINICAL SUPPORT (OUTPATIENT)
Dept: REHABILITATION | Facility: HOSPITAL | Age: 51
End: 2021-03-22
Payer: COMMERCIAL

## 2021-03-22 ENCOUNTER — OFFICE VISIT (OUTPATIENT)
Dept: PAIN MEDICINE | Facility: CLINIC | Age: 51
End: 2021-03-22
Payer: COMMERCIAL

## 2021-03-22 VITALS
BODY MASS INDEX: 25.84 KG/M2 | HEIGHT: 73 IN | HEART RATE: 66 BPM | DIASTOLIC BLOOD PRESSURE: 95 MMHG | WEIGHT: 195 LBS | SYSTOLIC BLOOD PRESSURE: 142 MMHG | RESPIRATION RATE: 18 BRPM

## 2021-03-22 DIAGNOSIS — M53.86 DECREASED RANGE OF MOTION OF LUMBAR SPINE: ICD-10-CM

## 2021-03-22 DIAGNOSIS — M54.16 LUMBAR RADICULOPATHY: Primary | ICD-10-CM

## 2021-03-22 DIAGNOSIS — R26.9 GAIT ABNORMALITY: ICD-10-CM

## 2021-03-22 DIAGNOSIS — R29.898 RIGHT LEG WEAKNESS: ICD-10-CM

## 2021-03-22 DIAGNOSIS — M62.81 PROXIMAL MUSCLE WEAKNESS: ICD-10-CM

## 2021-03-22 DIAGNOSIS — M47.816 LUMBAR SPONDYLOSIS: ICD-10-CM

## 2021-03-22 PROCEDURE — 3008F PR BODY MASS INDEX (BMI) DOCUMENTED: ICD-10-PCS | Mod: CPTII,S$GLB,, | Performed by: ANESTHESIOLOGY

## 2021-03-22 PROCEDURE — 99999 PR PBB SHADOW E&M-EST. PATIENT-LVL III: ICD-10-PCS | Mod: PBBFAC,,, | Performed by: ANESTHESIOLOGY

## 2021-03-22 PROCEDURE — 3077F PR MOST RECENT SYSTOLIC BLOOD PRESSURE >= 140 MM HG: ICD-10-PCS | Mod: CPTII,S$GLB,, | Performed by: ANESTHESIOLOGY

## 2021-03-22 PROCEDURE — 97112 NEUROMUSCULAR REEDUCATION: CPT

## 2021-03-22 PROCEDURE — 97110 THERAPEUTIC EXERCISES: CPT

## 2021-03-22 PROCEDURE — 3080F DIAST BP >= 90 MM HG: CPT | Mod: CPTII,S$GLB,, | Performed by: ANESTHESIOLOGY

## 2021-03-22 PROCEDURE — 99999 PR PBB SHADOW E&M-EST. PATIENT-LVL III: CPT | Mod: PBBFAC,,, | Performed by: ANESTHESIOLOGY

## 2021-03-22 PROCEDURE — 99214 OFFICE O/P EST MOD 30 MIN: CPT | Mod: S$GLB,,, | Performed by: ANESTHESIOLOGY

## 2021-03-22 PROCEDURE — 1125F PR PAIN SEVERITY QUANTIFIED, PAIN PRESENT: ICD-10-PCS | Mod: S$GLB,,, | Performed by: ANESTHESIOLOGY

## 2021-03-22 PROCEDURE — 3077F SYST BP >= 140 MM HG: CPT | Mod: CPTII,S$GLB,, | Performed by: ANESTHESIOLOGY

## 2021-03-22 PROCEDURE — 3008F BODY MASS INDEX DOCD: CPT | Mod: CPTII,S$GLB,, | Performed by: ANESTHESIOLOGY

## 2021-03-22 PROCEDURE — 99214 PR OFFICE/OUTPT VISIT, EST, LEVL IV, 30-39 MIN: ICD-10-PCS | Mod: S$GLB,,, | Performed by: ANESTHESIOLOGY

## 2021-03-22 PROCEDURE — 3080F PR MOST RECENT DIASTOLIC BLOOD PRESSURE >= 90 MM HG: ICD-10-PCS | Mod: CPTII,S$GLB,, | Performed by: ANESTHESIOLOGY

## 2021-03-22 PROCEDURE — 1125F AMNT PAIN NOTED PAIN PRSNT: CPT | Mod: S$GLB,,, | Performed by: ANESTHESIOLOGY

## 2021-03-25 ENCOUNTER — TELEPHONE (OUTPATIENT)
Dept: ENDOSCOPY | Facility: HOSPITAL | Age: 51
End: 2021-03-25

## 2021-03-29 ENCOUNTER — CLINICAL SUPPORT (OUTPATIENT)
Dept: REHABILITATION | Facility: HOSPITAL | Age: 51
End: 2021-03-29
Payer: COMMERCIAL

## 2021-03-29 DIAGNOSIS — R26.9 GAIT ABNORMALITY: ICD-10-CM

## 2021-03-29 DIAGNOSIS — M53.86 DECREASED RANGE OF MOTION OF LUMBAR SPINE: ICD-10-CM

## 2021-03-29 DIAGNOSIS — M62.81 PROXIMAL MUSCLE WEAKNESS: ICD-10-CM

## 2021-03-29 PROCEDURE — 97110 THERAPEUTIC EXERCISES: CPT

## 2021-03-31 ENCOUNTER — HOSPITAL ENCOUNTER (OUTPATIENT)
Facility: HOSPITAL | Age: 51
Discharge: HOME | End: 2021-03-31
Attending: ANESTHESIOLOGY | Admitting: ANESTHESIOLOGY
Payer: COMMERCIAL

## 2021-03-31 VITALS
WEIGHT: 191.5 LBS | RESPIRATION RATE: 16 BRPM | HEART RATE: 70 BPM | BODY MASS INDEX: 25.38 KG/M2 | DIASTOLIC BLOOD PRESSURE: 97 MMHG | HEIGHT: 73 IN | OXYGEN SATURATION: 100 % | TEMPERATURE: 98 F | SYSTOLIC BLOOD PRESSURE: 141 MMHG

## 2021-03-31 DIAGNOSIS — M54.16 LUMBAR RADICULOPATHY: Primary | ICD-10-CM

## 2021-03-31 PROCEDURE — 25500020 PHARM REV CODE 255: Performed by: ANESTHESIOLOGY

## 2021-03-31 PROCEDURE — 64483 NJX AA&/STRD TFRM EPI L/S 1: CPT | Mod: RT,,, | Performed by: ANESTHESIOLOGY

## 2021-03-31 PROCEDURE — 63600175 PHARM REV CODE 636 W HCPCS: Performed by: ANESTHESIOLOGY

## 2021-03-31 PROCEDURE — 25000003 PHARM REV CODE 250: Performed by: ANESTHESIOLOGY

## 2021-03-31 PROCEDURE — 64483 NJX AA&/STRD TFRM EPI L/S 1: CPT | Performed by: ANESTHESIOLOGY

## 2021-03-31 PROCEDURE — 64483 PR EPIDURAL INJ, ANES/STEROID, TRANSFORAMINAL, LUMB/SACR, SNGL LEVL: ICD-10-PCS | Mod: RT,,, | Performed by: ANESTHESIOLOGY

## 2021-03-31 PROCEDURE — 64484 NJX AA&/STRD TFRM EPI L/S EA: CPT | Performed by: ANESTHESIOLOGY

## 2021-03-31 PROCEDURE — 64484 NJX AA&/STRD TFRM EPI L/S EA: CPT | Mod: RT,,, | Performed by: ANESTHESIOLOGY

## 2021-03-31 PROCEDURE — 64484 PRA INJECT ANES/STEROID FORAMEN LUMBAR/SACRAL W IMG GUIDE ,EA ADD LEVEL: ICD-10-PCS | Mod: RT,,, | Performed by: ANESTHESIOLOGY

## 2021-03-31 RX ORDER — FENTANYL CITRATE 50 UG/ML
INJECTION, SOLUTION INTRAMUSCULAR; INTRAVENOUS
Status: DISCONTINUED | OUTPATIENT
Start: 2021-03-31 | End: 2021-03-31 | Stop reason: HOSPADM

## 2021-03-31 RX ORDER — DEXAMETHASONE SODIUM PHOSPHATE 10 MG/ML
INJECTION INTRAMUSCULAR; INTRAVENOUS
Status: DISCONTINUED | OUTPATIENT
Start: 2021-03-31 | End: 2021-03-31 | Stop reason: HOSPADM

## 2021-03-31 RX ORDER — MIDAZOLAM HYDROCHLORIDE 1 MG/ML
INJECTION, SOLUTION INTRAMUSCULAR; INTRAVENOUS
Status: DISCONTINUED | OUTPATIENT
Start: 2021-03-31 | End: 2021-03-31 | Stop reason: HOSPADM

## 2021-03-31 RX ORDER — LIDOCAINE HYDROCHLORIDE 10 MG/ML
INJECTION, SOLUTION EPIDURAL; INFILTRATION; INTRACAUDAL; PERINEURAL
Status: DISCONTINUED | OUTPATIENT
Start: 2021-03-31 | End: 2021-03-31 | Stop reason: HOSPADM

## 2021-04-14 ENCOUNTER — HOSPITAL ENCOUNTER (OUTPATIENT)
Dept: CARDIOLOGY | Facility: HOSPITAL | Age: 51
Discharge: HOME | End: 2021-04-14
Payer: COMMERCIAL

## 2021-04-14 ENCOUNTER — PATIENT MESSAGE (OUTPATIENT)
Dept: PAIN MEDICINE | Facility: CLINIC | Age: 51
End: 2021-04-14

## 2021-04-14 ENCOUNTER — OFFICE VISIT (OUTPATIENT)
Dept: INTERNAL MEDICINE | Facility: CLINIC | Age: 51
End: 2021-04-14
Payer: COMMERCIAL

## 2021-04-14 ENCOUNTER — LAB VISIT (OUTPATIENT)
Dept: LAB | Facility: HOSPITAL | Age: 51
End: 2021-04-14
Attending: FAMILY MEDICINE
Payer: COMMERCIAL

## 2021-04-14 ENCOUNTER — OFFICE VISIT (OUTPATIENT)
Dept: OTOLARYNGOLOGY | Facility: CLINIC | Age: 51
End: 2021-04-14
Payer: COMMERCIAL

## 2021-04-14 ENCOUNTER — CLINICAL SUPPORT (OUTPATIENT)
Dept: AUDIOLOGY | Facility: CLINIC | Age: 51
End: 2021-04-14
Payer: COMMERCIAL

## 2021-04-14 VITALS
SYSTOLIC BLOOD PRESSURE: 131 MMHG | HEART RATE: 70 BPM | WEIGHT: 190.5 LBS | BODY MASS INDEX: 25.13 KG/M2 | DIASTOLIC BLOOD PRESSURE: 90 MMHG | TEMPERATURE: 98 F

## 2021-04-14 VITALS
TEMPERATURE: 97 F | HEART RATE: 59 BPM | SYSTOLIC BLOOD PRESSURE: 132 MMHG | WEIGHT: 189.63 LBS | HEIGHT: 73 IN | OXYGEN SATURATION: 98 % | BODY MASS INDEX: 25.13 KG/M2 | DIASTOLIC BLOOD PRESSURE: 94 MMHG

## 2021-04-14 DIAGNOSIS — T48.5X5A RHINITIS MEDICAMENTOSA: ICD-10-CM

## 2021-04-14 DIAGNOSIS — H81.11 BENIGN PAROXYSMAL POSITIONAL VERTIGO OF RIGHT EAR: Primary | ICD-10-CM

## 2021-04-14 DIAGNOSIS — H81.11 BPPV (BENIGN PAROXYSMAL POSITIONAL VERTIGO), RIGHT: Primary | ICD-10-CM

## 2021-04-14 DIAGNOSIS — J31.0 RHINITIS MEDICAMENTOSA: ICD-10-CM

## 2021-04-14 DIAGNOSIS — R42 DIZZINESS: ICD-10-CM

## 2021-04-14 DIAGNOSIS — I10 ESSENTIAL HYPERTENSION: ICD-10-CM

## 2021-04-14 DIAGNOSIS — R42 DIZZINESS: Primary | ICD-10-CM

## 2021-04-14 DIAGNOSIS — H93.13 TINNITUS OF BOTH EARS: ICD-10-CM

## 2021-04-14 LAB
ALBUMIN SERPL BCP-MCNC: 4.2 G/DL (ref 3.5–5.2)
ALP SERPL-CCNC: 46 U/L (ref 55–135)
ALT SERPL W/O P-5'-P-CCNC: 12 U/L (ref 10–44)
ANION GAP SERPL CALC-SCNC: 6 MMOL/L (ref 8–16)
AST SERPL-CCNC: 16 U/L (ref 10–40)
BASOPHILS # BLD AUTO: 0.03 K/UL (ref 0–0.2)
BASOPHILS NFR BLD: 0.4 % (ref 0–1.9)
BILIRUB SERPL-MCNC: 0.5 MG/DL (ref 0.1–1)
BUN SERPL-MCNC: 15 MG/DL (ref 6–20)
CALCIUM SERPL-MCNC: 9.5 MG/DL (ref 8.7–10.5)
CHLORIDE SERPL-SCNC: 107 MMOL/L (ref 95–110)
CO2 SERPL-SCNC: 29 MMOL/L (ref 23–29)
CREAT SERPL-MCNC: 0.9 MG/DL (ref 0.5–1.4)
DIFFERENTIAL METHOD: ABNORMAL
EOSINOPHIL # BLD AUTO: 0.1 K/UL (ref 0–0.5)
EOSINOPHIL NFR BLD: 1.1 % (ref 0–8)
ERYTHROCYTE [DISTWIDTH] IN BLOOD BY AUTOMATED COUNT: 15.8 % (ref 11.5–14.5)
EST. GFR  (AFRICAN AMERICAN): >60 ML/MIN/1.73 M^2
EST. GFR  (NON AFRICAN AMERICAN): >60 ML/MIN/1.73 M^2
GLUCOSE SERPL-MCNC: 93 MG/DL (ref 70–110)
HCT VFR BLD AUTO: 39.1 % (ref 40–54)
HGB BLD-MCNC: 13 G/DL (ref 14–18)
IMM GRANULOCYTES # BLD AUTO: 0.02 K/UL (ref 0–0.04)
IMM GRANULOCYTES NFR BLD AUTO: 0.2 % (ref 0–0.5)
LYMPHOCYTES # BLD AUTO: 1.7 K/UL (ref 1–4.8)
LYMPHOCYTES NFR BLD: 20.9 % (ref 18–48)
MCH RBC QN AUTO: 29.3 PG (ref 27–31)
MCHC RBC AUTO-ENTMCNC: 33.2 G/DL (ref 32–36)
MCV RBC AUTO: 88 FL (ref 82–98)
MONOCYTES # BLD AUTO: 0.6 K/UL (ref 0.3–1)
MONOCYTES NFR BLD: 7.5 % (ref 4–15)
NEUTROPHILS # BLD AUTO: 5.7 K/UL (ref 1.8–7.7)
NEUTROPHILS NFR BLD: 69.9 % (ref 38–73)
NRBC BLD-RTO: 0 /100 WBC
PLATELET # BLD AUTO: 216 K/UL (ref 150–450)
PMV BLD AUTO: 8.8 FL (ref 9.2–12.9)
POTASSIUM SERPL-SCNC: 3.8 MMOL/L (ref 3.5–5.1)
PROT SERPL-MCNC: 7.2 G/DL (ref 6–8.4)
RBC # BLD AUTO: 4.44 M/UL (ref 4.6–6.2)
SODIUM SERPL-SCNC: 142 MMOL/L (ref 136–145)
WBC # BLD AUTO: 8.09 K/UL (ref 3.9–12.7)

## 2021-04-14 PROCEDURE — 1126F PR PAIN SEVERITY QUANTIFIED, NO PAIN PRESENT: ICD-10-PCS | Mod: S$GLB,,, | Performed by: PHYSICIAN ASSISTANT

## 2021-04-14 PROCEDURE — 99999 PR PBB SHADOW E&M-EST. PATIENT-LVL III: ICD-10-PCS | Mod: PBBFAC,,, | Performed by: PHYSICIAN ASSISTANT

## 2021-04-14 PROCEDURE — 99214 PR OFFICE/OUTPT VISIT, EST, LEVL IV, 30-39 MIN: ICD-10-PCS | Mod: S$GLB,,, | Performed by: PHYSICIAN ASSISTANT

## 2021-04-14 PROCEDURE — 99999 PR PBB SHADOW E&M-EST. PATIENT-LVL IV: ICD-10-PCS | Mod: PBBFAC,,, | Performed by: PHYSICIAN ASSISTANT

## 2021-04-14 PROCEDURE — 3008F BODY MASS INDEX DOCD: CPT | Mod: CPTII,S$GLB,, | Performed by: PHYSICIAN ASSISTANT

## 2021-04-14 PROCEDURE — 99999 PR PBB SHADOW E&M-EST. PATIENT-LVL III: CPT | Mod: PBBFAC,,, | Performed by: PHYSICIAN ASSISTANT

## 2021-04-14 PROCEDURE — 93005 ELECTROCARDIOGRAM TRACING: CPT

## 2021-04-14 PROCEDURE — 3008F PR BODY MASS INDEX (BMI) DOCUMENTED: ICD-10-PCS | Mod: CPTII,S$GLB,, | Performed by: PHYSICIAN ASSISTANT

## 2021-04-14 PROCEDURE — 99214 OFFICE O/P EST MOD 30 MIN: CPT | Mod: S$GLB,,, | Performed by: PHYSICIAN ASSISTANT

## 2021-04-14 PROCEDURE — 85025 COMPLETE CBC W/AUTO DIFF WBC: CPT | Performed by: PHYSICIAN ASSISTANT

## 2021-04-14 PROCEDURE — 80053 COMPREHEN METABOLIC PANEL: CPT | Performed by: PHYSICIAN ASSISTANT

## 2021-04-14 PROCEDURE — 1126F AMNT PAIN NOTED NONE PRSNT: CPT | Mod: S$GLB,,, | Performed by: PHYSICIAN ASSISTANT

## 2021-04-14 PROCEDURE — 93010 EKG 12-LEAD: ICD-10-PCS | Mod: ,,, | Performed by: INTERNAL MEDICINE

## 2021-04-14 PROCEDURE — 93010 ELECTROCARDIOGRAM REPORT: CPT | Mod: ,,, | Performed by: INTERNAL MEDICINE

## 2021-04-14 PROCEDURE — 99999 PR PBB SHADOW E&M-EST. PATIENT-LVL IV: CPT | Mod: PBBFAC,,, | Performed by: PHYSICIAN ASSISTANT

## 2021-04-14 RX ORDER — AMLODIPINE BESYLATE 5 MG/1
5 TABLET ORAL DAILY
Qty: 30 TABLET | Refills: 0 | Status: SHIPPED | OUTPATIENT
Start: 2021-04-14 | End: 2021-05-03 | Stop reason: SDUPTHER

## 2021-04-14 RX ORDER — FLUTICASONE PROPIONATE 50 MCG
2 SPRAY, SUSPENSION (ML) NASAL DAILY
Qty: 16 G | Refills: 11 | Status: SHIPPED | OUTPATIENT
Start: 2021-04-14 | End: 2022-04-14

## 2021-04-30 ENCOUNTER — TELEPHONE (OUTPATIENT)
Dept: PAIN MEDICINE | Facility: CLINIC | Age: 51
End: 2021-04-30

## 2021-05-03 ENCOUNTER — OFFICE VISIT (OUTPATIENT)
Dept: PAIN MEDICINE | Facility: CLINIC | Age: 51
End: 2021-05-03
Payer: COMMERCIAL

## 2021-05-03 ENCOUNTER — OFFICE VISIT (OUTPATIENT)
Dept: INTERNAL MEDICINE | Facility: CLINIC | Age: 51
End: 2021-05-03
Payer: COMMERCIAL

## 2021-05-03 VITALS
SYSTOLIC BLOOD PRESSURE: 130 MMHG | BODY MASS INDEX: 25.54 KG/M2 | WEIGHT: 192.69 LBS | DIASTOLIC BLOOD PRESSURE: 84 MMHG | TEMPERATURE: 98 F | HEIGHT: 73 IN | HEART RATE: 74 BPM | OXYGEN SATURATION: 98 %

## 2021-05-03 VITALS
SYSTOLIC BLOOD PRESSURE: 123 MMHG | HEART RATE: 65 BPM | DIASTOLIC BLOOD PRESSURE: 86 MMHG | BODY MASS INDEX: 25.42 KG/M2 | HEIGHT: 73 IN | WEIGHT: 191.81 LBS

## 2021-05-03 DIAGNOSIS — M54.16 RIGHT LUMBAR RADICULOPATHY: Primary | ICD-10-CM

## 2021-05-03 DIAGNOSIS — I10 ESSENTIAL HYPERTENSION: ICD-10-CM

## 2021-05-03 DIAGNOSIS — R29.898 RIGHT LEG WEAKNESS: ICD-10-CM

## 2021-05-03 DIAGNOSIS — R20.0 RIGHT LEG NUMBNESS: ICD-10-CM

## 2021-05-03 DIAGNOSIS — R42 DIZZINESS AND GIDDINESS: ICD-10-CM

## 2021-05-03 PROCEDURE — 3008F PR BODY MASS INDEX (BMI) DOCUMENTED: ICD-10-PCS | Mod: CPTII,S$GLB,, | Performed by: PHYSICIAN ASSISTANT

## 2021-05-03 PROCEDURE — 99999 PR PBB SHADOW E&M-EST. PATIENT-LVL III: CPT | Mod: PBBFAC,,, | Performed by: PHYSICIAN ASSISTANT

## 2021-05-03 PROCEDURE — 99214 OFFICE O/P EST MOD 30 MIN: CPT | Mod: S$GLB,,, | Performed by: PHYSICIAN ASSISTANT

## 2021-05-03 PROCEDURE — 1126F PR PAIN SEVERITY QUANTIFIED, NO PAIN PRESENT: ICD-10-PCS | Mod: S$GLB,,, | Performed by: PHYSICIAN ASSISTANT

## 2021-05-03 PROCEDURE — 99999 PR PBB SHADOW E&M-EST. PATIENT-LVL III: ICD-10-PCS | Mod: PBBFAC,,, | Performed by: PHYSICIAN ASSISTANT

## 2021-05-03 PROCEDURE — 3008F BODY MASS INDEX DOCD: CPT | Mod: CPTII,S$GLB,, | Performed by: PHYSICIAN ASSISTANT

## 2021-05-03 PROCEDURE — 99214 PR OFFICE/OUTPT VISIT, EST, LEVL IV, 30-39 MIN: ICD-10-PCS | Mod: S$GLB,,, | Performed by: PHYSICIAN ASSISTANT

## 2021-05-03 PROCEDURE — 1126F AMNT PAIN NOTED NONE PRSNT: CPT | Mod: S$GLB,,, | Performed by: PHYSICIAN ASSISTANT

## 2021-05-03 RX ORDER — GABAPENTIN 300 MG/1
300 CAPSULE ORAL 3 TIMES DAILY
Qty: 90 CAPSULE | Refills: 1 | Status: SHIPPED | OUTPATIENT
Start: 2021-05-03 | End: 2021-05-03 | Stop reason: SDUPTHER

## 2021-05-03 RX ORDER — GABAPENTIN 300 MG/1
300 CAPSULE ORAL 3 TIMES DAILY
Qty: 90 CAPSULE | Refills: 1 | Status: SHIPPED | OUTPATIENT
Start: 2021-05-03 | End: 2022-06-06

## 2021-05-03 RX ORDER — AMLODIPINE BESYLATE 5 MG/1
5 TABLET ORAL DAILY
Qty: 90 TABLET | Refills: 3 | Status: SHIPPED | OUTPATIENT
Start: 2021-05-03 | End: 2021-10-11

## 2021-05-03 RX ORDER — MECLIZINE HYDROCHLORIDE 25 MG/1
25 TABLET ORAL 3 TIMES DAILY PRN
Qty: 30 TABLET | Refills: 0 | Status: SHIPPED | OUTPATIENT
Start: 2021-05-03 | End: 2021-10-11

## 2021-05-10 ENCOUNTER — PATIENT MESSAGE (OUTPATIENT)
Dept: INTERNAL MEDICINE | Facility: CLINIC | Age: 51
End: 2021-05-10

## 2021-05-10 ENCOUNTER — OFFICE VISIT (OUTPATIENT)
Dept: OTOLARYNGOLOGY | Facility: CLINIC | Age: 51
End: 2021-05-10
Payer: COMMERCIAL

## 2021-05-10 ENCOUNTER — HOSPITAL ENCOUNTER (OUTPATIENT)
Dept: RADIOLOGY | Facility: HOSPITAL | Age: 51
Discharge: HOME | End: 2021-05-10
Attending: PHYSICIAN ASSISTANT
Payer: COMMERCIAL

## 2021-05-10 VITALS
HEIGHT: 73 IN | TEMPERATURE: 99 F | SYSTOLIC BLOOD PRESSURE: 127 MMHG | HEART RATE: 68 BPM | WEIGHT: 191.81 LBS | DIASTOLIC BLOOD PRESSURE: 79 MMHG | BODY MASS INDEX: 25.42 KG/M2

## 2021-05-10 DIAGNOSIS — R42 DIZZINESS AND GIDDINESS: ICD-10-CM

## 2021-05-10 DIAGNOSIS — H81.11 BENIGN PAROXYSMAL POSITIONAL VERTIGO OF RIGHT EAR: Primary | ICD-10-CM

## 2021-05-10 PROCEDURE — 99999 PR PBB SHADOW E&M-EST. PATIENT-LVL III: CPT | Mod: PBBFAC,,, | Performed by: PHYSICIAN ASSISTANT

## 2021-05-10 PROCEDURE — A9585 GADOBUTROL INJECTION: HCPCS | Mod: PO | Performed by: PHYSICIAN ASSISTANT

## 2021-05-10 PROCEDURE — 70553 MRI BRAIN STEM W/O & W/DYE: CPT | Mod: TC,PO

## 2021-05-10 PROCEDURE — 99213 OFFICE O/P EST LOW 20 MIN: CPT | Mod: S$GLB,,, | Performed by: PHYSICIAN ASSISTANT

## 2021-05-10 PROCEDURE — 3008F BODY MASS INDEX DOCD: CPT | Mod: CPTII,S$GLB,, | Performed by: PHYSICIAN ASSISTANT

## 2021-05-10 PROCEDURE — 25500020 PHARM REV CODE 255: Mod: PO | Performed by: PHYSICIAN ASSISTANT

## 2021-05-10 PROCEDURE — 99999 PR PBB SHADOW E&M-EST. PATIENT-LVL III: ICD-10-PCS | Mod: PBBFAC,,, | Performed by: PHYSICIAN ASSISTANT

## 2021-05-10 PROCEDURE — 99213 PR OFFICE/OUTPT VISIT, EST, LEVL III, 20-29 MIN: ICD-10-PCS | Mod: S$GLB,,, | Performed by: PHYSICIAN ASSISTANT

## 2021-05-10 PROCEDURE — 70553 MRI BRAIN STEM W/O & W/DYE: CPT | Mod: 26,,, | Performed by: RADIOLOGY

## 2021-05-10 PROCEDURE — 1126F AMNT PAIN NOTED NONE PRSNT: CPT | Mod: S$GLB,,, | Performed by: PHYSICIAN ASSISTANT

## 2021-05-10 PROCEDURE — 70553 MRI BRAIN W WO CONTRAST: ICD-10-PCS | Mod: 26,,, | Performed by: RADIOLOGY

## 2021-05-10 PROCEDURE — 1126F PR PAIN SEVERITY QUANTIFIED, NO PAIN PRESENT: ICD-10-PCS | Mod: S$GLB,,, | Performed by: PHYSICIAN ASSISTANT

## 2021-05-10 PROCEDURE — 3008F PR BODY MASS INDEX (BMI) DOCUMENTED: ICD-10-PCS | Mod: CPTII,S$GLB,, | Performed by: PHYSICIAN ASSISTANT

## 2021-05-10 RX ORDER — GADOBUTROL 604.72 MG/ML
8 INJECTION INTRAVENOUS
Status: COMPLETED | OUTPATIENT
Start: 2021-05-10 | End: 2021-05-10

## 2021-05-10 RX ADMIN — GADOBUTROL 8 ML: 604.72 INJECTION INTRAVENOUS at 08:05

## 2021-07-24 ENCOUNTER — DOCUMENTATION ONLY (OUTPATIENT)
Dept: REHABILITATION | Facility: HOSPITAL | Age: 51
End: 2021-07-24

## 2021-07-24 PROBLEM — R26.9 GAIT ABNORMALITY: Status: RESOLVED | Noted: 2021-02-12 | Resolved: 2021-07-24

## 2021-07-24 PROBLEM — M62.81 PROXIMAL MUSCLE WEAKNESS: Status: RESOLVED | Noted: 2021-02-12 | Resolved: 2021-07-24

## 2021-07-24 PROBLEM — M53.86 DECREASED RANGE OF MOTION OF LUMBAR SPINE: Status: RESOLVED | Noted: 2021-02-12 | Resolved: 2021-07-24

## 2021-10-11 ENCOUNTER — OFFICE VISIT (OUTPATIENT)
Dept: INTERNAL MEDICINE | Facility: CLINIC | Age: 51
End: 2021-10-11
Payer: COMMERCIAL

## 2021-10-11 DIAGNOSIS — I10 ESSENTIAL HYPERTENSION: ICD-10-CM

## 2021-10-11 PROCEDURE — 99213 OFFICE O/P EST LOW 20 MIN: CPT | Mod: 95,,, | Performed by: PHYSICIAN ASSISTANT

## 2021-10-11 PROCEDURE — 1160F RVW MEDS BY RX/DR IN RCRD: CPT | Mod: CPTII,95,, | Performed by: PHYSICIAN ASSISTANT

## 2021-10-11 PROCEDURE — 1160F PR REVIEW ALL MEDS BY PRESCRIBER/CLIN PHARMACIST DOCUMENTED: ICD-10-PCS | Mod: CPTII,95,, | Performed by: PHYSICIAN ASSISTANT

## 2021-10-11 PROCEDURE — 1159F PR MEDICATION LIST DOCUMENTED IN MEDICAL RECORD: ICD-10-PCS | Mod: CPTII,95,, | Performed by: PHYSICIAN ASSISTANT

## 2021-10-11 PROCEDURE — 1159F MED LIST DOCD IN RCRD: CPT | Mod: CPTII,95,, | Performed by: PHYSICIAN ASSISTANT

## 2021-10-11 PROCEDURE — 99213 PR OFFICE/OUTPT VISIT, EST, LEVL III, 20-29 MIN: ICD-10-PCS | Mod: 95,,, | Performed by: PHYSICIAN ASSISTANT

## 2021-10-11 RX ORDER — AMLODIPINE BESYLATE 10 MG/1
10 TABLET ORAL DAILY
Qty: 30 TABLET | Refills: 0 | Status: SHIPPED | OUTPATIENT
Start: 2021-10-11 | End: 2021-11-08 | Stop reason: SDUPTHER

## 2021-11-08 ENCOUNTER — OFFICE VISIT (OUTPATIENT)
Dept: INTERNAL MEDICINE | Facility: CLINIC | Age: 51
End: 2021-11-08
Payer: COMMERCIAL

## 2021-11-08 VITALS
HEIGHT: 73 IN | TEMPERATURE: 97 F | DIASTOLIC BLOOD PRESSURE: 88 MMHG | HEART RATE: 72 BPM | BODY MASS INDEX: 25.92 KG/M2 | OXYGEN SATURATION: 98 % | SYSTOLIC BLOOD PRESSURE: 114 MMHG | WEIGHT: 195.56 LBS

## 2021-11-08 DIAGNOSIS — I10 ESSENTIAL HYPERTENSION: Primary | ICD-10-CM

## 2021-11-08 DIAGNOSIS — Z12.5 SCREENING FOR PROSTATE CANCER: ICD-10-CM

## 2021-11-08 PROCEDURE — 3079F DIAST BP 80-89 MM HG: CPT | Mod: CPTII,S$GLB,, | Performed by: PHYSICIAN ASSISTANT

## 2021-11-08 PROCEDURE — 3074F PR MOST RECENT SYSTOLIC BLOOD PRESSURE < 130 MM HG: ICD-10-PCS | Mod: CPTII,S$GLB,, | Performed by: PHYSICIAN ASSISTANT

## 2021-11-08 PROCEDURE — 99213 OFFICE O/P EST LOW 20 MIN: CPT | Mod: S$GLB,,, | Performed by: PHYSICIAN ASSISTANT

## 2021-11-08 PROCEDURE — 1160F RVW MEDS BY RX/DR IN RCRD: CPT | Mod: CPTII,S$GLB,, | Performed by: PHYSICIAN ASSISTANT

## 2021-11-08 PROCEDURE — 3008F BODY MASS INDEX DOCD: CPT | Mod: CPTII,S$GLB,, | Performed by: PHYSICIAN ASSISTANT

## 2021-11-08 PROCEDURE — 1160F PR REVIEW ALL MEDS BY PRESCRIBER/CLIN PHARMACIST DOCUMENTED: ICD-10-PCS | Mod: CPTII,S$GLB,, | Performed by: PHYSICIAN ASSISTANT

## 2021-11-08 PROCEDURE — 1159F MED LIST DOCD IN RCRD: CPT | Mod: CPTII,S$GLB,, | Performed by: PHYSICIAN ASSISTANT

## 2021-11-08 PROCEDURE — 3008F PR BODY MASS INDEX (BMI) DOCUMENTED: ICD-10-PCS | Mod: CPTII,S$GLB,, | Performed by: PHYSICIAN ASSISTANT

## 2021-11-08 PROCEDURE — 99999 PR PBB SHADOW E&M-EST. PATIENT-LVL III: CPT | Mod: PBBFAC,,, | Performed by: PHYSICIAN ASSISTANT

## 2021-11-08 PROCEDURE — 3079F PR MOST RECENT DIASTOLIC BLOOD PRESSURE 80-89 MM HG: ICD-10-PCS | Mod: CPTII,S$GLB,, | Performed by: PHYSICIAN ASSISTANT

## 2021-11-08 PROCEDURE — 1159F PR MEDICATION LIST DOCUMENTED IN MEDICAL RECORD: ICD-10-PCS | Mod: CPTII,S$GLB,, | Performed by: PHYSICIAN ASSISTANT

## 2021-11-08 PROCEDURE — 99999 PR PBB SHADOW E&M-EST. PATIENT-LVL III: ICD-10-PCS | Mod: PBBFAC,,, | Performed by: PHYSICIAN ASSISTANT

## 2021-11-08 PROCEDURE — 99213 PR OFFICE/OUTPT VISIT, EST, LEVL III, 20-29 MIN: ICD-10-PCS | Mod: S$GLB,,, | Performed by: PHYSICIAN ASSISTANT

## 2021-11-08 PROCEDURE — 3074F SYST BP LT 130 MM HG: CPT | Mod: CPTII,S$GLB,, | Performed by: PHYSICIAN ASSISTANT

## 2021-11-08 RX ORDER — AMLODIPINE BESYLATE 10 MG/1
10 TABLET ORAL DAILY
Qty: 90 TABLET | Refills: 4 | Status: SHIPPED | OUTPATIENT
Start: 2021-11-08 | End: 2022-06-06 | Stop reason: ALTCHOICE

## 2022-05-09 ENCOUNTER — TELEPHONE (OUTPATIENT)
Dept: INTERNAL MEDICINE | Facility: CLINIC | Age: 52
End: 2022-05-09
Payer: COMMERCIAL

## 2022-05-09 NOTE — TELEPHONE ENCOUNTER
----- Message from Samantha Hyatt sent at 5/9/2022  8:14 AM CDT -----  Contact: SAMAN YAO JR. [[id number]] [phone]  Type: Appointment Request    Name of Caller: SAMAN YAO JR. [[id number]]  When is the first available appointment? Do not have access  Reason for Visit:  6mo F/U  Best Call Back Number: [phone]  Additional Information:  Patient would like to schedule for 5/16/22 afternoon if possible.

## 2022-06-06 ENCOUNTER — LAB VISIT (OUTPATIENT)
Dept: LAB | Facility: HOSPITAL | Age: 52
End: 2022-06-06
Attending: PHYSICIAN ASSISTANT
Payer: COMMERCIAL

## 2022-06-06 ENCOUNTER — OFFICE VISIT (OUTPATIENT)
Dept: INTERNAL MEDICINE | Facility: CLINIC | Age: 52
End: 2022-06-06
Payer: COMMERCIAL

## 2022-06-06 VITALS
WEIGHT: 190.25 LBS | SYSTOLIC BLOOD PRESSURE: 122 MMHG | OXYGEN SATURATION: 98 % | DIASTOLIC BLOOD PRESSURE: 88 MMHG | BODY MASS INDEX: 25.22 KG/M2 | HEART RATE: 88 BPM | HEIGHT: 73 IN

## 2022-06-06 DIAGNOSIS — Z12.5 SCREENING FOR PROSTATE CANCER: ICD-10-CM

## 2022-06-06 DIAGNOSIS — I10 ESSENTIAL HYPERTENSION: Primary | ICD-10-CM

## 2022-06-06 DIAGNOSIS — I10 ESSENTIAL HYPERTENSION: ICD-10-CM

## 2022-06-06 LAB
ALBUMIN SERPL BCP-MCNC: 4.1 G/DL (ref 3.5–5.2)
ALP SERPL-CCNC: 39 U/L (ref 55–135)
ALT SERPL W/O P-5'-P-CCNC: 13 U/L (ref 10–44)
ANION GAP SERPL CALC-SCNC: 9 MMOL/L (ref 8–16)
AST SERPL-CCNC: 16 U/L (ref 10–40)
BASOPHILS # BLD AUTO: 0.06 K/UL (ref 0–0.2)
BASOPHILS NFR BLD: 0.9 % (ref 0–1.9)
BILIRUB SERPL-MCNC: 0.5 MG/DL (ref 0.1–1)
BUN SERPL-MCNC: 16 MG/DL (ref 6–20)
CALCIUM SERPL-MCNC: 9.6 MG/DL (ref 8.7–10.5)
CHLORIDE SERPL-SCNC: 106 MMOL/L (ref 95–110)
CHOLEST SERPL-MCNC: 196 MG/DL (ref 120–199)
CHOLEST/HDLC SERPL: 3.6 {RATIO} (ref 2–5)
CO2 SERPL-SCNC: 26 MMOL/L (ref 23–29)
COMPLEXED PSA SERPL-MCNC: 0.31 NG/ML (ref 0–4)
CREAT SERPL-MCNC: 1.1 MG/DL (ref 0.5–1.4)
DIFFERENTIAL METHOD: ABNORMAL
EOSINOPHIL # BLD AUTO: 0.3 K/UL (ref 0–0.5)
EOSINOPHIL NFR BLD: 3.9 % (ref 0–8)
ERYTHROCYTE [DISTWIDTH] IN BLOOD BY AUTOMATED COUNT: 14.3 % (ref 11.5–14.5)
EST. GFR  (AFRICAN AMERICAN): >60 ML/MIN/1.73 M^2
EST. GFR  (NON AFRICAN AMERICAN): >60 ML/MIN/1.73 M^2
GLUCOSE SERPL-MCNC: 72 MG/DL (ref 70–110)
HCT VFR BLD AUTO: 40.8 % (ref 40–54)
HDLC SERPL-MCNC: 55 MG/DL (ref 40–75)
HDLC SERPL: 28.1 % (ref 20–50)
HGB BLD-MCNC: 13.3 G/DL (ref 14–18)
IMM GRANULOCYTES # BLD AUTO: 0.01 K/UL (ref 0–0.04)
IMM GRANULOCYTES NFR BLD AUTO: 0.2 % (ref 0–0.5)
LDLC SERPL CALC-MCNC: 121.4 MG/DL (ref 63–159)
LYMPHOCYTES # BLD AUTO: 2.4 K/UL (ref 1–4.8)
LYMPHOCYTES NFR BLD: 36 % (ref 18–48)
MCH RBC QN AUTO: 29.6 PG (ref 27–31)
MCHC RBC AUTO-ENTMCNC: 32.6 G/DL (ref 32–36)
MCV RBC AUTO: 91 FL (ref 82–98)
MONOCYTES # BLD AUTO: 0.7 K/UL (ref 0.3–1)
MONOCYTES NFR BLD: 10.7 % (ref 4–15)
NEUTROPHILS # BLD AUTO: 3.2 K/UL (ref 1.8–7.7)
NEUTROPHILS NFR BLD: 48.3 % (ref 38–73)
NONHDLC SERPL-MCNC: 141 MG/DL
NRBC BLD-RTO: 0 /100 WBC
PLATELET # BLD AUTO: 250 K/UL (ref 150–450)
PMV BLD AUTO: 10.1 FL (ref 9.2–12.9)
POTASSIUM SERPL-SCNC: 4.3 MMOL/L (ref 3.5–5.1)
PROT SERPL-MCNC: 6.7 G/DL (ref 6–8.4)
RBC # BLD AUTO: 4.49 M/UL (ref 4.6–6.2)
SODIUM SERPL-SCNC: 141 MMOL/L (ref 136–145)
TRIGL SERPL-MCNC: 98 MG/DL (ref 30–150)
WBC # BLD AUTO: 6.61 K/UL (ref 3.9–12.7)

## 2022-06-06 PROCEDURE — 80053 COMPREHEN METABOLIC PANEL: CPT | Performed by: PHYSICIAN ASSISTANT

## 2022-06-06 PROCEDURE — 1160F RVW MEDS BY RX/DR IN RCRD: CPT | Mod: CPTII,S$GLB,, | Performed by: PHYSICIAN ASSISTANT

## 2022-06-06 PROCEDURE — 3074F SYST BP LT 130 MM HG: CPT | Mod: CPTII,S$GLB,, | Performed by: PHYSICIAN ASSISTANT

## 2022-06-06 PROCEDURE — 3008F PR BODY MASS INDEX (BMI) DOCUMENTED: ICD-10-PCS | Mod: CPTII,S$GLB,, | Performed by: PHYSICIAN ASSISTANT

## 2022-06-06 PROCEDURE — 85025 COMPLETE CBC W/AUTO DIFF WBC: CPT | Performed by: PHYSICIAN ASSISTANT

## 2022-06-06 PROCEDURE — 1159F MED LIST DOCD IN RCRD: CPT | Mod: CPTII,S$GLB,, | Performed by: PHYSICIAN ASSISTANT

## 2022-06-06 PROCEDURE — 80061 LIPID PANEL: CPT | Performed by: PHYSICIAN ASSISTANT

## 2022-06-06 PROCEDURE — 1160F PR REVIEW ALL MEDS BY PRESCRIBER/CLIN PHARMACIST DOCUMENTED: ICD-10-PCS | Mod: CPTII,S$GLB,, | Performed by: PHYSICIAN ASSISTANT

## 2022-06-06 PROCEDURE — 36415 COLL VENOUS BLD VENIPUNCTURE: CPT | Performed by: PHYSICIAN ASSISTANT

## 2022-06-06 PROCEDURE — 1159F PR MEDICATION LIST DOCUMENTED IN MEDICAL RECORD: ICD-10-PCS | Mod: CPTII,S$GLB,, | Performed by: PHYSICIAN ASSISTANT

## 2022-06-06 PROCEDURE — 3079F PR MOST RECENT DIASTOLIC BLOOD PRESSURE 80-89 MM HG: ICD-10-PCS | Mod: CPTII,S$GLB,, | Performed by: PHYSICIAN ASSISTANT

## 2022-06-06 PROCEDURE — 99213 PR OFFICE/OUTPT VISIT, EST, LEVL III, 20-29 MIN: ICD-10-PCS | Mod: S$GLB,,, | Performed by: PHYSICIAN ASSISTANT

## 2022-06-06 PROCEDURE — 4010F ACE/ARB THERAPY RXD/TAKEN: CPT | Mod: CPTII,S$GLB,, | Performed by: PHYSICIAN ASSISTANT

## 2022-06-06 PROCEDURE — 3008F BODY MASS INDEX DOCD: CPT | Mod: CPTII,S$GLB,, | Performed by: PHYSICIAN ASSISTANT

## 2022-06-06 PROCEDURE — 3074F PR MOST RECENT SYSTOLIC BLOOD PRESSURE < 130 MM HG: ICD-10-PCS | Mod: CPTII,S$GLB,, | Performed by: PHYSICIAN ASSISTANT

## 2022-06-06 PROCEDURE — 99999 PR PBB SHADOW E&M-EST. PATIENT-LVL III: ICD-10-PCS | Mod: PBBFAC,,, | Performed by: PHYSICIAN ASSISTANT

## 2022-06-06 PROCEDURE — 4010F PR ACE/ARB THEARPY RXD/TAKEN: ICD-10-PCS | Mod: CPTII,S$GLB,, | Performed by: PHYSICIAN ASSISTANT

## 2022-06-06 PROCEDURE — 3079F DIAST BP 80-89 MM HG: CPT | Mod: CPTII,S$GLB,, | Performed by: PHYSICIAN ASSISTANT

## 2022-06-06 PROCEDURE — 99999 PR PBB SHADOW E&M-EST. PATIENT-LVL III: CPT | Mod: PBBFAC,,, | Performed by: PHYSICIAN ASSISTANT

## 2022-06-06 PROCEDURE — 99213 OFFICE O/P EST LOW 20 MIN: CPT | Mod: S$GLB,,, | Performed by: PHYSICIAN ASSISTANT

## 2022-06-06 PROCEDURE — 84153 ASSAY OF PSA TOTAL: CPT | Performed by: PHYSICIAN ASSISTANT

## 2022-06-06 RX ORDER — AMLODIPINE AND BENAZEPRIL HYDROCHLORIDE 10; 20 MG/1; MG/1
1 CAPSULE ORAL DAILY
Qty: 90 CAPSULE | Refills: 3 | Status: SHIPPED | OUTPATIENT
Start: 2022-06-06 | End: 2022-08-29

## 2022-06-06 NOTE — PROGRESS NOTES
Subjective:      Patient ID: Alessandro Olivares Jr. is a 52 y.o. male.    Chief Complaint: Follow-up    HPI   Mr. Olivares here today for a follow up for his high blood pressure.Currently on amlodipine 10mg once daily.  BP still borderline high. Occasional headaches. HIgh 130s systolic 90s diastolic usually.   No chest pain, sob, abd pain, or changes in bowel movements.       Patient Active Problem List   Diagnosis    Chronic allergic rhinitis    Nasal polyps    Muscle strain    Migraine without status migrainosus, not intractable    Essential hypertension    Elevated cholesterol with elevated triglycerides    Lumbar radiculopathy         Review of Systems   Constitutional: Negative for activity change, appetite change, chills, diaphoresis, fatigue, fever and unexpected weight change.   HENT: Negative.  Negative for congestion, hearing loss, postnasal drip, rhinorrhea, sore throat, trouble swallowing and voice change.    Eyes: Negative.  Negative for visual disturbance.   Respiratory: Negative.  Negative for cough, choking, chest tightness and shortness of breath.    Cardiovascular: Negative for chest pain, palpitations and leg swelling.   Gastrointestinal: Negative for abdominal distention, abdominal pain, blood in stool, constipation, diarrhea, nausea and vomiting.   Endocrine: Negative for cold intolerance, heat intolerance, polydipsia and polyuria.   Genitourinary: Negative.  Negative for difficulty urinating and frequency.   Musculoskeletal: Negative for arthralgias, back pain, gait problem, joint swelling and myalgias.   Skin: Negative for color change, pallor, rash and wound.   Neurological: Negative for dizziness, tremors, weakness, light-headedness, numbness and headaches.   Hematological: Negative for adenopathy.   Psychiatric/Behavioral: Negative for behavioral problems, confusion, self-injury, sleep disturbance and suicidal ideas. The patient is not nervous/anxious.      Objective:   /88 (BP  "Location: Right arm, Patient Position: Sitting, BP Method: Medium (Manual))   Pulse 88   Ht 6' 1" (1.854 m)   Wt 86.3 kg (190 lb 4.1 oz)   SpO2 98%   BMI 25.10 kg/m²     Physical Exam  Vitals reviewed.   Constitutional:       General: He is not in acute distress.     Appearance: Normal appearance. He is well-developed. He is not ill-appearing, toxic-appearing or diaphoretic.   HENT:      Head: Normocephalic and atraumatic.      Right Ear: External ear normal.      Left Ear: External ear normal.      Nose: Nose normal.   Eyes:      Conjunctiva/sclera: Conjunctivae normal.      Pupils: Pupils are equal, round, and reactive to light.   Cardiovascular:      Rate and Rhythm: Normal rate and regular rhythm.      Heart sounds: Normal heart sounds. No murmur heard.    No friction rub. No gallop.   Pulmonary:      Effort: Pulmonary effort is normal. No respiratory distress.      Breath sounds: Normal breath sounds. No wheezing or rales.   Chest:      Chest wall: No tenderness.   Abdominal:      General: There is no distension.      Palpations: Abdomen is soft.      Tenderness: There is no abdominal tenderness.   Musculoskeletal:         General: Normal range of motion.      Cervical back: Normal range of motion and neck supple.   Lymphadenopathy:      Cervical: No cervical adenopathy.   Skin:     General: Skin is warm and dry.   Neurological:      Mental Status: He is alert and oriented to person, place, and time.   Psychiatric:         Behavior: Behavior normal.         Thought Content: Thought content normal.         Judgment: Judgment normal.         Assessment:     1. Essential hypertension      Plan:   Essential hypertension  -     amlodipine-benazepril 10-20mg (LOTREL) 10-20 mg per capsule; Take 1 capsule by mouth once daily.  Dispense: 90 capsule; Refill: 3    -change amlodipine 10mg to lotrel   -log and monitor blood pressure at home.     Follow up in about 4 weeks (around 7/4/2022), or if symptoms worsen or " fail to improve.

## 2022-08-29 ENCOUNTER — OFFICE VISIT (OUTPATIENT)
Dept: INTERNAL MEDICINE | Facility: CLINIC | Age: 52
End: 2022-08-29
Payer: COMMERCIAL

## 2022-08-29 DIAGNOSIS — J01.10 ACUTE NON-RECURRENT FRONTAL SINUSITIS: Primary | ICD-10-CM

## 2022-08-29 PROCEDURE — 4010F ACE/ARB THERAPY RXD/TAKEN: CPT | Mod: CPTII,95,, | Performed by: NURSE PRACTITIONER

## 2022-08-29 PROCEDURE — 1160F PR REVIEW ALL MEDS BY PRESCRIBER/CLIN PHARMACIST DOCUMENTED: ICD-10-PCS | Mod: CPTII,95,, | Performed by: NURSE PRACTITIONER

## 2022-08-29 PROCEDURE — 1159F MED LIST DOCD IN RCRD: CPT | Mod: CPTII,95,, | Performed by: NURSE PRACTITIONER

## 2022-08-29 PROCEDURE — 4010F PR ACE/ARB THEARPY RXD/TAKEN: ICD-10-PCS | Mod: CPTII,95,, | Performed by: NURSE PRACTITIONER

## 2022-08-29 PROCEDURE — 1159F PR MEDICATION LIST DOCUMENTED IN MEDICAL RECORD: ICD-10-PCS | Mod: CPTII,95,, | Performed by: NURSE PRACTITIONER

## 2022-08-29 PROCEDURE — 1160F RVW MEDS BY RX/DR IN RCRD: CPT | Mod: CPTII,95,, | Performed by: NURSE PRACTITIONER

## 2022-08-29 PROCEDURE — 99213 PR OFFICE/OUTPT VISIT, EST, LEVL III, 20-29 MIN: ICD-10-PCS | Mod: 95,,, | Performed by: NURSE PRACTITIONER

## 2022-08-29 PROCEDURE — 99213 OFFICE O/P EST LOW 20 MIN: CPT | Mod: 95,,, | Performed by: NURSE PRACTITIONER

## 2022-08-29 RX ORDER — LEVOCETIRIZINE DIHYDROCHLORIDE 5 MG/1
5 TABLET, FILM COATED ORAL NIGHTLY
Qty: 30 TABLET | Refills: 5 | Status: SHIPPED | OUTPATIENT
Start: 2022-08-29 | End: 2022-10-10

## 2022-08-29 RX ORDER — METHYLPREDNISOLONE 4 MG/1
TABLET ORAL
Qty: 21 TABLET | Refills: 0 | Status: SHIPPED | OUTPATIENT
Start: 2022-08-29 | End: 2022-10-10

## 2022-08-29 RX ORDER — AMLODIPINE BESYLATE 10 MG/1
10 TABLET ORAL DAILY
COMMUNITY
Start: 2022-08-14 | End: 2022-12-05

## 2022-08-29 NOTE — PATIENT INSTRUCTIONS
Patient Education       Sinusitis Discharge Instructions, Adult   About this topic   Your sinuses are hollow areas in the bones of your face. They have a thin lining that normally makes a small amount of mucus. When you have sinusitis, the lining gets swollen and makes extra mucus. You may have sinusitis with or after a cold. Most of the time sinusitis will get better in 1 to 2 weeks.  Sinusitis is most often caused by a virus, so antibiotics wont help. But some people do need antibiotics. If the doctor ordered antibiotics for you, be sure to follow the instructions. It is important to take all of your antibiotics even if you start to feel better.       What care is needed at home?   Ask your doctor what you need to do when you go home. Make sure you ask questions if you do not understand what you need to do.  Try to thin the mucus.  Drink lots of liquids to stay hydrated.  Use a cool mist humidifier to avoid dry air.  Use saline nose drops or a saline nose rinse to relieve stuffiness.  Wash your hands often. This will help keep others healthy.  Do not smoke or be in smoke-filled places. Avoid things that may cause breathing problems like fumes, pollution, dust, and other common allergens and irritants.  You may want to take medicine like ibuprofen, naproxen, or acetaminophen to help with pain.  What follow-up care is needed?   Your doctor may ask you to make visits to the office to check on your progress. Be sure to keep your visits.  Your doctor will tell you if other tests are needed.  Your doctor may send you for allergy tests or to an allergy expert.   What lifestyle changes are needed?   Avoid drinks that contain caffeine or alcohol.  Try to stop smoking. Avoid being around others who smoke. Smoke can damage the small hairs inside your sinuses.  What drugs may be needed?   The doctor may order drugs to:  Help with pain and swelling  Fight an infection  Control coughing  Dry up the sinuses  Help a runny or  stuffy nose  Will physical activity be limited?   You do not have to limit your activity. You may want to rest more if you have a fever or headache.   What problems could happen?   Infections that happen again and again  Asthma attack  Coughing  Loss of voice  What can be done to prevent this health problem?   Keep your nose as moist as possible. Use saline sprays, washes, and a humidifier often.  Avoid being around cigarette and cigar smoke or strong odors from chemicals.  Avoid long periods of swimming in pools treated with chlorine. This can bother the lining of the nose and sinuses.  Avoid water diving. This forces water into the sinuses from the nasal passages.  Manage your allergies with your doctor's help.  Use an air conditioner if allergies are a problem.  Before air travel, use a drug to dry up mucus. As the plane takes off or lands, the pressure can cause sinus pain.  When do I need to call the doctor?   You have a stiff neck, especially if you also have fever, chills, vomiting, or severe headache  You have trouble thinking clearly.  You have trouble seeing or have double vision.  You have swelling or redness or pain around one or both eyes.  You have a fever of 102°F (38.9°C) or higher, or have shaking chills or sweats.  You have an upset stomach and throwing up.  You have more pain in your face and head.  You are not getting better within 1 to 2 weeks.  Teach Back: Helping You Understand   The Teach Back Method helps you understand the information we are giving you. After you talk with the staff, tell them in your own words what you learned. This helps to make sure the staff has covered each thing clearly. It also helps to explain things that may have been confusing. Before going home, make sure you can do these:  I can tell you about my condition.  I can tell you what may help ease my breathing.  I can tell you what I will do if I have a fever, chills, or trouble breathing.  Where can I learn more?    American Academy [employer]  https://familydoctor.org/condition/sinusitis/   Centers for Disease Control and Prevention  https://www.cdc.gov/antibiotic-use/community/for-hcp/outpatient-hcp/adult-treatment-rec.html   Last Reviewed Date   2021-06-09  Consumer Information Use and Disclaimer   This information is not specific medical advice and does not replace information you receive from your health care provider. This is only a brief summary of general information. It does NOT include all information about conditions, illnesses, injuries, tests, procedures, treatments, therapies, discharge instructions or life-style choices that may apply to you. You must talk with your health care provider for complete information about your health and treatment options. This information should not be used to decide whether or not to accept your health care providers advice, instructions or recommendations. Only your health care provider has the knowledge and training to provide advice that is right for you.  Copyright   Copyright © 2021 [employer] and its affiliates and/or licensors. All rights reserved.

## 2022-08-29 NOTE — PROGRESS NOTES
Subjective:       Patient ID: Alessandro Olivares Jr. is a 52 y.o. male.    Chief Complaint: No chief complaint on file.    The patient location is: [place]  The chief complaint leading to consultation is: Sinus congestion/headache    Visit type: audiovisual    Face to Face time with patient: 9 minutes  10 minutes of total time spent on the encounter, which includes face to face time and non-face to face time preparing to see the patient (eg, review of tests), Obtaining and/or reviewing separately obtained history, Documenting clinical information in the electronic or other health record, Independently interpreting results (not separately reported) and communicating results to the patient/family/caregiver, or Care coordination (not separately reported).         Each patient to whom he or she provides medical services by telemedicine is:  (1) informed of the relationship between the physician and patient and the respective role of any other health care provider with respect to management of the patient; and (2) notified that he or she may decline to receive medical services by telemedicine and may withdraw from such care at any time.    Notes:         Sinus Problem  This is a new problem. The current episode started 1 to 4 weeks ago (1-2 weeks). The problem has been waxing and waning since onset. There has been no fever. His pain is at a severity of 4/10. The pain is moderate. Associated symptoms include congestion, headaches and sinus pressure. Pertinent negatives include no chills, coughing, ear pain, hoarse voice, neck pain, shortness of breath, sneezing, sore throat or swollen glands. Treatments tried: sudafed. The treatment provided mild relief.     Patient Active Problem List   Diagnosis    Chronic allergic rhinitis    Nasal polyps    Muscle strain    Migraine without status migrainosus, not intractable    Essential hypertension    Elevated cholesterol with elevated triglycerides    Lumbar radiculopathy        Family History   Problem Relation Age of Onset    Diabetes Mother     Heart attack Mother     Cataracts Mother     Hypertension Father      Past Surgical History:   Procedure Laterality Date    COLONOSCOPY  11/20/2020    hem, otherwise nl exam, repeat 10 yrs; Tameka Burris MD    COLONOSCOPY N/A 11/20/2020    Procedure: COLONOSCOPY;  Surgeon: Tameka Burris MD;  Location: [facility] ENDO;  Service: Endoscopy;  Laterality: N/A;    ESOPHAGOGASTRODUODENOSCOPY N/A 11/20/2020    Procedure: EGD (ESOPHAGOGASTRODUODENOSCOPY);  Surgeon: Tameka Burris MD;  Location: [facility] ENDO;  Service: Endoscopy;  Laterality: N/A;    NASAL DILATION      SELECTIVE INJECTION OF ANESTHETIC AGENT AROUND LUMBAR SPINAL NERVE ROOT BY TRANSFORAMINAL APPROACH Right 2/25/2021    Procedure: BLOCK, SPINAL NERVE ROOT, LUMBAR, SELECTIVE, TRANSFORAMINAL APPROACH Right L4-5 L5-S1 RN IV sedation;  Surgeon: Parker Palomo MD;  Location: [facility];  Service: Pain Management;  Laterality: Right;    SELECTIVE INJECTION OF ANESTHETIC AGENT AROUND LUMBAR SPINAL NERVE ROOT BY TRANSFORAMINAL APPROACH Right 3/31/2021    Procedure: BLOCK, SPINAL NERVE ROOT, LUMBAR, SELECTIVE, TRANSFORAMINAL APPROACH RIght L4-5, L5-S1 RN IV sedation;  Surgeon: Parker Palomo MD;  Location: [facility];  Service: Pain Management;  Laterality: Right;    TONSILLECTOMY           Current Outpatient Medications:     amLODIPine (NORVASC) 10 MG tablet, Take 10 mg by mouth once daily., Disp: , Rfl:     levocetirizine (XYZAL) 5 MG tablet, Take 1 tablet (5 mg total) by mouth every evening., Disp: 30 tablet, Rfl: 5    methylPREDNISolone (MEDROL DOSEPACK) 4 mg tablet, use as directed, Disp: 21 tablet, Rfl: 0    Review of Systems   Constitutional:  Negative for activity change, chills, fatigue, fever and unexpected weight change.   HENT:  Positive for congestion, postnasal drip and sinus pressure. Negative for ear pain, hearing loss, hoarse voice, rhinorrhea, sinus pain, sneezing, sore throat  "and trouble swallowing.    Eyes:  Negative for discharge and visual disturbance.   Respiratory:  Negative for cough, chest tightness, shortness of breath and wheezing.    Cardiovascular:  Negative for chest pain and palpitations.   Gastrointestinal:  Negative for abdominal pain, blood in stool, constipation, diarrhea and vomiting.   Endocrine: Negative for polydipsia and polyuria.   Genitourinary:  Negative for difficulty urinating, hematuria and urgency.   Musculoskeletal:  Negative for arthralgias, joint swelling and neck pain.   Neurological:  Positive for headaches. Negative for dizziness, weakness and light-headedness.   Psychiatric/Behavioral:  Negative for confusion and dysphoric mood.      Objective:   There were no vitals taken for this visit.     Physical Exam  Constitutional:       General: He is not in acute distress.     Appearance: Normal appearance. He is not ill-appearing.   HENT:      Head: Normocephalic.   Pulmonary:      Effort: Pulmonary effort is normal. No respiratory distress.   Neurological:      Mental Status: He is alert and oriented to person, place, and time.   Psychiatric:         Mood and Affect: Mood normal.       Assessment & Plan     Problem List Items Addressed This Visit    None  Visit Diagnoses       Acute non-recurrent frontal sinusitis    -  Primary    Relevant Medications    methylPREDNISolone (MEDROL DOSEPACK) 4 mg tablet    levocetirizine (XYZAL) 5 MG tablet        Tylenol/ibuprofen for pain   Hand hygiene.   Maintain adequate hydration.   Antihistamine and flonase for nasal congestion and upper respiratory symptoms.   Rest.       Follow up if symptoms worsen or fail to improve.          Portions of this note may have been created with voice recognition software. Occasional "wrong-word" or "sound-a-like" substitutions may have occurred due to the inherent limitations of voice recognition software. Please, read the note carefully and recognize, using context, where " substitutions have occurred.

## 2022-10-10 ENCOUNTER — OFFICE VISIT (OUTPATIENT)
Dept: INTERNAL MEDICINE | Facility: CLINIC | Age: 52
End: 2022-10-10
Payer: COMMERCIAL

## 2022-10-10 ENCOUNTER — TELEPHONE (OUTPATIENT)
Dept: INTERNAL MEDICINE | Facility: CLINIC | Age: 52
End: 2022-10-10
Payer: COMMERCIAL

## 2022-10-10 VITALS
OXYGEN SATURATION: 98 % | DIASTOLIC BLOOD PRESSURE: 88 MMHG | HEIGHT: 73 IN | TEMPERATURE: 98 F | HEART RATE: 73 BPM | BODY MASS INDEX: 25.42 KG/M2 | WEIGHT: 191.81 LBS | SYSTOLIC BLOOD PRESSURE: 120 MMHG

## 2022-10-10 DIAGNOSIS — I10 ESSENTIAL HYPERTENSION: Primary | ICD-10-CM

## 2022-10-10 DIAGNOSIS — R51.9 CHRONIC DAILY HEADACHE: ICD-10-CM

## 2022-10-10 DIAGNOSIS — M25.512 ACUTE PAIN OF LEFT SHOULDER: ICD-10-CM

## 2022-10-10 PROCEDURE — 3079F PR MOST RECENT DIASTOLIC BLOOD PRESSURE 80-89 MM HG: ICD-10-PCS | Mod: CPTII,S$GLB,, | Performed by: PHYSICIAN ASSISTANT

## 2022-10-10 PROCEDURE — 99999 PR PBB SHADOW E&M-EST. PATIENT-LVL IV: ICD-10-PCS | Mod: PBBFAC,,, | Performed by: PHYSICIAN ASSISTANT

## 2022-10-10 PROCEDURE — 3008F BODY MASS INDEX DOCD: CPT | Mod: CPTII,S$GLB,, | Performed by: PHYSICIAN ASSISTANT

## 2022-10-10 PROCEDURE — 4010F ACE/ARB THERAPY RXD/TAKEN: CPT | Mod: CPTII,S$GLB,, | Performed by: PHYSICIAN ASSISTANT

## 2022-10-10 PROCEDURE — 99213 PR OFFICE/OUTPT VISIT, EST, LEVL III, 20-29 MIN: ICD-10-PCS | Mod: S$GLB,,, | Performed by: PHYSICIAN ASSISTANT

## 2022-10-10 PROCEDURE — 1159F PR MEDICATION LIST DOCUMENTED IN MEDICAL RECORD: ICD-10-PCS | Mod: CPTII,S$GLB,, | Performed by: PHYSICIAN ASSISTANT

## 2022-10-10 PROCEDURE — 1159F MED LIST DOCD IN RCRD: CPT | Mod: CPTII,S$GLB,, | Performed by: PHYSICIAN ASSISTANT

## 2022-10-10 PROCEDURE — 3074F PR MOST RECENT SYSTOLIC BLOOD PRESSURE < 130 MM HG: ICD-10-PCS | Mod: CPTII,S$GLB,, | Performed by: PHYSICIAN ASSISTANT

## 2022-10-10 PROCEDURE — 3079F DIAST BP 80-89 MM HG: CPT | Mod: CPTII,S$GLB,, | Performed by: PHYSICIAN ASSISTANT

## 2022-10-10 PROCEDURE — 3074F SYST BP LT 130 MM HG: CPT | Mod: CPTII,S$GLB,, | Performed by: PHYSICIAN ASSISTANT

## 2022-10-10 PROCEDURE — 99213 OFFICE O/P EST LOW 20 MIN: CPT | Mod: S$GLB,,, | Performed by: PHYSICIAN ASSISTANT

## 2022-10-10 PROCEDURE — 99999 PR PBB SHADOW E&M-EST. PATIENT-LVL IV: CPT | Mod: PBBFAC,,, | Performed by: PHYSICIAN ASSISTANT

## 2022-10-10 PROCEDURE — 4010F PR ACE/ARB THEARPY RXD/TAKEN: ICD-10-PCS | Mod: CPTII,S$GLB,, | Performed by: PHYSICIAN ASSISTANT

## 2022-10-10 PROCEDURE — 1160F RVW MEDS BY RX/DR IN RCRD: CPT | Mod: CPTII,S$GLB,, | Performed by: PHYSICIAN ASSISTANT

## 2022-10-10 PROCEDURE — 1160F PR REVIEW ALL MEDS BY PRESCRIBER/CLIN PHARMACIST DOCUMENTED: ICD-10-PCS | Mod: CPTII,S$GLB,, | Performed by: PHYSICIAN ASSISTANT

## 2022-10-10 PROCEDURE — 3008F PR BODY MASS INDEX (BMI) DOCUMENTED: ICD-10-PCS | Mod: CPTII,S$GLB,, | Performed by: PHYSICIAN ASSISTANT

## 2022-10-10 NOTE — PROGRESS NOTES
Subjective:      Patient ID: Alessandro Olivares Jr. is a 52 y.o. male.    Chief Complaint: Hypertension    HPI  Here today for a follow up for his high blood pressure and to discuss left shoulder pain. Pt has been having daily headache and his home BP readings have been running high.  BP machine at home running 140s systolic 90s diastolic.   Headaches usually related to elevated blood pressure or allergies.  Headaches improved when treated sinus symptoms with medrol dose pack a couple weeks ago but have come back. Mild-moderate headaches on a daily basis. Varies (unilateral vs bilateral). Hasn't seen headache specialist.   Right now taking 10 mg amlodipine for BP. Stopped lotrel because ran out.     Left shoulder pain after lifting something heavy a few weeks ago. Pain severe last week but significantly improved this week.  Last week was severe 10/10.  Does heavy lifting at work. Did have reduced ROM but improved. Took tylenol and applied heat. Today left shoulder pain 2-3/10.      Patient Active Problem List   Diagnosis    Chronic allergic rhinitis    Nasal polyps    Muscle strain    Migraine without status migrainosus, not intractable    Essential hypertension    Elevated cholesterol with elevated triglycerides    Lumbar radiculopathy       Current Outpatient Medications:     amLODIPine (NORVASC) 10 MG tablet, Take 10 mg by mouth once daily., Disp: , Rfl:     Review of Systems   Constitutional:  Negative for activity change, appetite change, chills, diaphoresis, fatigue, fever and unexpected weight change.   HENT: Negative.  Negative for congestion, hearing loss, postnasal drip, rhinorrhea, sore throat, trouble swallowing and voice change.    Eyes: Negative.  Negative for visual disturbance.   Respiratory: Negative.  Negative for cough, choking, chest tightness and shortness of breath.    Cardiovascular:  Negative for chest pain, palpitations and leg swelling.   Gastrointestinal:  Negative for abdominal  "distention, abdominal pain, blood in stool, constipation, diarrhea, nausea and vomiting.   Endocrine: Negative for cold intolerance, heat intolerance, polydipsia and polyuria.   Genitourinary: Negative.  Negative for difficulty urinating and frequency.   Musculoskeletal:  Positive for arthralgias. Negative for back pain, gait problem, joint swelling and myalgias.   Skin:  Negative for color change, pallor, rash and wound.   Neurological:  Negative for dizziness, tremors, weakness, light-headedness, numbness and headaches.   Hematological:  Negative for adenopathy.   Psychiatric/Behavioral:  Negative for behavioral problems, confusion, self-injury, sleep disturbance and suicidal ideas. The patient is not nervous/anxious.    Objective:   /88 (BP Location: Left arm, Patient Position: Sitting, BP Method: Medium (Manual))   Pulse 73   Temp 98.2 °F (36.8 °C) (Tympanic)   Ht 6' 1" (1.854 m)   Wt 87 kg (191 lb 12.8 oz)   SpO2 98%   BMI 25.30 kg/m²     Physical Exam  Vitals reviewed.   Constitutional:       General: He is not in acute distress.     Appearance: Normal appearance. He is well-developed. He is not ill-appearing, toxic-appearing or diaphoretic.   HENT:      Head: Normocephalic and atraumatic.      Right Ear: External ear normal.      Left Ear: External ear normal.      Nose: Nose normal.   Eyes:      Conjunctiva/sclera: Conjunctivae normal.      Pupils: Pupils are equal, round, and reactive to light.   Cardiovascular:      Rate and Rhythm: Normal rate and regular rhythm.      Heart sounds: Normal heart sounds. No murmur heard.    No friction rub. No gallop.   Pulmonary:      Effort: Pulmonary effort is normal. No respiratory distress.      Breath sounds: Normal breath sounds. No wheezing or rales.   Chest:      Chest wall: No tenderness.   Abdominal:      General: There is no distension.      Palpations: Abdomen is soft.      Tenderness: There is no abdominal tenderness.   Musculoskeletal:         " General: Normal range of motion.      Right shoulder: Normal. No swelling, deformity, effusion, laceration, tenderness, bony tenderness or crepitus. Normal range of motion. Normal strength. Normal pulse.      Right upper arm: Normal. No swelling, edema or tenderness.      Left upper arm: Normal. No swelling, edema or tenderness.      Cervical back: Normal range of motion and neck supple.   Lymphadenopathy:      Cervical: No cervical adenopathy.   Skin:     General: Skin is warm and dry.      Capillary Refill: Capillary refill takes less than 2 seconds.      Findings: No rash.   Neurological:      Mental Status: He is alert and oriented to person, place, and time.      Motor: No weakness.      Coordination: Coordination normal.      Gait: Gait normal.   Psychiatric:         Mood and Affect: Mood normal.         Behavior: Behavior normal.         Thought Content: Thought content normal.         Judgment: Judgment normal.       Assessment:     1. Essential hypertension    2. Acute pain of left shoulder    3. Chronic daily headache      Plan:   Essential hypertension  -log bp for 2 weeks and bring in log and bp cuff to follow up appointment for review.     Acute pain of left shoulder  -stable for now. If reoccurs. RICE +/- referral to pt/ot    Chronic daily headache  -     Ambulatory referral/consult to Tele-Headache; Future; Expected date: 10/17/2022  -red flags discussed.   -recheck in 2 weeks with blood pressure review.   -consider sleep study for eval of daily headache.     Follow up in about 2 weeks (around 10/24/2022), or if symptoms worsen or fail to improve.

## 2022-10-13 NOTE — TELEPHONE ENCOUNTER
Left message on Mr. Olivares's voicemail requesting a call back.     Audrey Rand MA  You 3 days ago     [place]  Good morning, the pt has a referral in can you please give him a call to schedule. Thank you

## 2022-11-09 ENCOUNTER — OFFICE VISIT (OUTPATIENT)
Dept: INTERNAL MEDICINE | Facility: CLINIC | Age: 52
End: 2022-11-09
Payer: COMMERCIAL

## 2022-11-09 ENCOUNTER — PATIENT MESSAGE (OUTPATIENT)
Dept: INTERNAL MEDICINE | Facility: CLINIC | Age: 52
End: 2022-11-09

## 2022-11-09 ENCOUNTER — TELEPHONE (OUTPATIENT)
Dept: INTERNAL MEDICINE | Facility: CLINIC | Age: 52
End: 2022-11-09
Payer: COMMERCIAL

## 2022-11-09 ENCOUNTER — CLINICAL SUPPORT (OUTPATIENT)
Dept: INTERNAL MEDICINE | Facility: CLINIC | Age: 52
End: 2022-11-09
Payer: COMMERCIAL

## 2022-11-09 DIAGNOSIS — R50.9 FEVER, UNSPECIFIED FEVER CAUSE: Primary | ICD-10-CM

## 2022-11-09 DIAGNOSIS — R05.1 ACUTE COUGH: ICD-10-CM

## 2022-11-09 DIAGNOSIS — R50.9 FEVER, UNSPECIFIED FEVER CAUSE: ICD-10-CM

## 2022-11-09 LAB
CTP QC/QA: YES
CTP QC/QA: YES
POC MOLECULAR INFLUENZA A AGN: NEGATIVE
POC MOLECULAR INFLUENZA B AGN: NEGATIVE
SARS-COV-2 RDRP RESP QL NAA+PROBE: NEGATIVE

## 2022-11-09 PROCEDURE — 99213 OFFICE O/P EST LOW 20 MIN: CPT | Mod: 95,,, | Performed by: FAMILY MEDICINE

## 2022-11-09 PROCEDURE — 99999 PR PBB SHADOW E&M-EST. PATIENT-LVL I: ICD-10-PCS | Mod: PBBFAC,,,

## 2022-11-09 PROCEDURE — 87502 INFLUENZA DNA AMP PROBE: CPT | Mod: QW,S$GLB,, | Performed by: NURSE PRACTITIONER

## 2022-11-09 PROCEDURE — 99213 PR OFFICE/OUTPT VISIT, EST, LEVL III, 20-29 MIN: ICD-10-PCS | Mod: 95,,, | Performed by: FAMILY MEDICINE

## 2022-11-09 PROCEDURE — 87635: ICD-10-PCS | Mod: QW,S$GLB,, | Performed by: FAMILY MEDICINE

## 2022-11-09 PROCEDURE — 99999 PR PBB SHADOW E&M-EST. PATIENT-LVL I: CPT | Mod: PBBFAC,,,

## 2022-11-09 PROCEDURE — 4010F PR ACE/ARB THEARPY RXD/TAKEN: ICD-10-PCS | Mod: CPTII,95,, | Performed by: FAMILY MEDICINE

## 2022-11-09 PROCEDURE — 87635 SARS-COV-2 COVID-19 AMP PRB: CPT | Mod: QW,S$GLB,, | Performed by: FAMILY MEDICINE

## 2022-11-09 PROCEDURE — 87502 POCT INFLUENZA A/B MOLECULAR: ICD-10-PCS | Mod: QW,S$GLB,, | Performed by: NURSE PRACTITIONER

## 2022-11-09 PROCEDURE — 4010F ACE/ARB THERAPY RXD/TAKEN: CPT | Mod: CPTII,95,, | Performed by: FAMILY MEDICINE

## 2022-11-09 NOTE — PROGRESS NOTES
Subjective:       Patient ID: Alessandro Olivares Jr. is a 52 y.o. male.    Chief Complaint: No chief complaint on file.    The patient location is:home  The chief complaint leading to consultation is:  Fever cough    Visit type: audiovisual    Face to Face time with patient: 10 minutes of total time spent on the encounter, which includes face to face time and non-face to face time preparing to see the patient (eg, review of tests), Obtaining and/or reviewing separately obtained history, Documenting clinical information in the electronic or other health record, Independently interpreting results (not separately reported) and communicating results to the patient/family/caregiver, or Care coordination (not separately reported).         Each patient to whom he or she provides medical services by telemedicine is:  (1) informed of the relationship between the physician and patient and the respective role of any other health care provider with respect to management of the patient; and (2) notified that he or she may decline to receive medical services by telemedicine and may withdraw from such care at any time.    Notes:         Sore Throat   This is a new problem. The current episode started in the past 7 days. The problem has been waxing and waning. The pain is worse on the right side. The maximum temperature recorded prior to his arrival was 100 - 100.9 F. The pain is at a severity of 2/10. The pain is moderate. Associated symptoms include congestion, coughing, headaches, a hoarse voice, neck pain, shortness of breath, stridor and swollen glands. Pertinent negatives include no abdominal pain, diarrhea, drooling, ear discharge, ear pain, plugged ear sensation, trouble swallowing or vomiting. He has had no exposure to strep or mono. He has tried NSAIDs and acetaminophen for the symptoms. The treatment provided mild relief.   Review of Systems   Constitutional:  Positive for chills and fever.   HENT:  Positive for  congestion, hoarse voice and sore throat. Negative for drooling, ear discharge, ear pain and trouble swallowing.    Respiratory:  Positive for cough, shortness of breath and stridor.    Cardiovascular:  Negative for chest pain and palpitations.   Gastrointestinal:  Negative for abdominal distention, abdominal pain, diarrhea and vomiting.   Musculoskeletal:  Positive for neck pain.   Neurological:  Positive for headaches.     Objective:      Physical Exam  Constitutional:       General: He is not in acute distress.     Appearance: He is not diaphoretic.   HENT:      Nose:      Comments: Mild nasal congestion  Pulmonary:      Effort: Pulmonary effort is normal. No respiratory distress.      Breath sounds: No wheezing.      Comments: Not coughing during exam  Skin:     Coloration: Skin is not pale.      Findings: No erythema.   Neurological:      Mental Status: He is alert and oriented to person, place, and time.   Psychiatric:         Mood and Affect: Mood normal.         Behavior: Behavior normal.         Thought Content: Thought content normal.         Judgment: Judgment normal.       Lab Visit on 06/06/2022   Component Date Value Ref Range Status    PSA, Screen 06/06/2022 0.31  0.00 - 4.00 ng/mL Final    WBC 06/06/2022 6.61  3.90 - 12.70 K/uL Final    RBC 06/06/2022 4.49 (L)  4.60 - 6.20 M/uL Final    Hemoglobin 06/06/2022 13.3 (L)  14.0 - 18.0 g/dL Final    Hematocrit 06/06/2022 40.8  40.0 - 54.0 % Final    MCV 06/06/2022 91  82 - 98 fL Final    MCH 06/06/2022 29.6  27.0 - 31.0 pg Final    MCHC 06/06/2022 32.6  32.0 - 36.0 g/dL Final    RDW 06/06/2022 14.3  11.5 - 14.5 % Final    Platelets 06/06/2022 250  150 - 450 K/uL Final    MPV 06/06/2022 10.1  9.2 - 12.9 fL Final    Immature Granulocytes 06/06/2022 0.2  0.0 - 0.5 % Final    Gran # (ANC) 06/06/2022 3.2  1.8 - 7.7 K/uL Final    Immature Grans (Abs) 06/06/2022 0.01  0.00 - 0.04 K/uL Final    Lymph # 06/06/2022 2.4  1.0 - 4.8 K/uL Final    Mono # 06/06/2022 0.7   0.3 - 1.0 K/uL Final    Eos # 06/06/2022 0.3  0.0 - 0.5 K/uL Final    Baso # 06/06/2022 0.06  0.00 - 0.20 K/uL Final    nRBC 06/06/2022 0  0 /100 WBC Final    Gran % 06/06/2022 48.3  38.0 - 73.0 % Final    Lymph % 06/06/2022 36.0  18.0 - 48.0 % Final    Mono % 06/06/2022 10.7  4.0 - 15.0 % Final    Eosinophil % 06/06/2022 3.9  0.0 - 8.0 % Final    Basophil % 06/06/2022 0.9  0.0 - 1.9 % Final    Differential Method 06/06/2022 Automated   Final    Cholesterol 06/06/2022 196  120 - 199 mg/dL Final    Triglycerides 06/06/2022 98  30 - 150 mg/dL Final    HDL 06/06/2022 55  40 - 75 mg/dL Final    LDL Cholesterol 06/06/2022 121.4  63.0 - 159.0 mg/dL Final    HDL/Cholesterol Ratio 06/06/2022 28.1  20.0 - 50.0 % Final    Total Cholesterol/HDL Ratio 06/06/2022 3.6  2.0 - 5.0 Final    Non-HDL Cholesterol 06/06/2022 141  mg/dL Final    Sodium 06/06/2022 141  136 - 145 mmol/L Final    Potassium 06/06/2022 4.3  3.5 - 5.1 mmol/L Final    Chloride 06/06/2022 106  95 - 110 mmol/L Final    CO2 06/06/2022 26  23 - 29 mmol/L Final    Glucose 06/06/2022 72  70 - 110 mg/dL Final    BUN 06/06/2022 16  6 - 20 mg/dL Final    Creatinine 06/06/2022 1.1  0.5 - 1.4 mg/dL Final    Calcium 06/06/2022 9.6  8.7 - 10.5 mg/dL Final    Total Protein 06/06/2022 6.7  6.0 - 8.4 g/dL Final    Albumin 06/06/2022 4.1  3.5 - 5.2 g/dL Final    Total Bilirubin 06/06/2022 0.5  0.1 - 1.0 mg/dL Final    Alkaline Phosphatase 06/06/2022 39 (L)  55 - 135 U/L Final    AST 06/06/2022 16  10 - 40 U/L Final    ALT 06/06/2022 13  10 - 44 U/L Final    Anion Gap 06/06/2022 9  8 - 16 mmol/L Final    eGFR if African American 06/06/2022 >60.0  >60 mL/min/1.73 m^2 Final    eGFR if non African American 06/06/2022 >60.0  >60 mL/min/1.73 m^2 Final     Assessment:       1. Fever, unspecified fever cause    2. Acute cough        Plan:   POCT COVID POCT flu immunization was ordered.  Disposition pending results.  Several coworkers with similar symptoms recently      Fever,  unspecified fever cause    Acute cough

## 2022-11-09 NOTE — PROGRESS NOTES
Pt came in with orders from Dr Blackwood to get flu and covid POCT test. Pt afebrile. Temp 97.8.  Both test done with negative results. Results documented.

## 2022-11-09 NOTE — PROGRESS NOTES
Subjective:       Patient ID: Alessandro Olivares Jr. is a 52 y.o. male.    Chief Complaint: No chief complaint on file.    The patient location is: home  The chief complaint leading to consultation is:  Fever cough    Visit type: audiovisual    Face to Face time with patient: 10 minutes of total time spent on the encounter, which includes face to face time and non-face to face time preparing to see the patient (eg, review of tests), Obtaining and/or reviewing separately obtained history, Documenting clinical information in the electronic or other health record, Independently interpreting results (not separately reported) and communicating results to the patient/family/caregiver, or Care coordination (not separately reported).         Each patient to whom he or she provides medical services by telemedicine is:  (1) informed of the relationship between the physician and patient and the respective role of any other health care provider with respect to management of the patient; and (2) notified that he or she may decline to receive medical services by telemedicine and may withdraw from such care at any time.    Notes:         Review of Systems   Constitutional:  Positive for chills and fever.   HENT:  Positive for congestion.    Respiratory:  Positive for cough. Negative for shortness of breath.    Cardiovascular:  Negative for chest pain and palpitations.   Gastrointestinal:  Negative for abdominal distention, abdominal pain, diarrhea and nausea.   Genitourinary:  Negative for dysuria, frequency, hematuria and urgency.     Objective:      Physical Exam  Constitutional:       General: He is not in acute distress.     Appearance: He is not ill-appearing or diaphoretic.   HENT:      Nose:      Comments: Mild congestion  Pulmonary:      Effort: Pulmonary effort is normal. No respiratory distress.      Breath sounds: No wheezing.      Comments: Not coughing during exam  Skin:     Coloration: Skin is not pale.      Findings:  No erythema.   Neurological:      Mental Status: He is alert and oriented to person, place, and time.   Psychiatric:         Mood and Affect: Mood normal.         Behavior: Behavior normal.         Thought Content: Thought content normal.         Judgment: Judgment normal.       Lab Visit on 06/06/2022   Component Date Value Ref Range Status    PSA, Screen 06/06/2022 0.31  0.00 - 4.00 ng/mL Final    WBC 06/06/2022 6.61  3.90 - 12.70 K/uL Final    RBC 06/06/2022 4.49 (L)  4.60 - 6.20 M/uL Final    Hemoglobin 06/06/2022 13.3 (L)  14.0 - 18.0 g/dL Final    Hematocrit 06/06/2022 40.8  40.0 - 54.0 % Final    MCV 06/06/2022 91  82 - 98 fL Final    MCH 06/06/2022 29.6  27.0 - 31.0 pg Final    MCHC 06/06/2022 32.6  32.0 - 36.0 g/dL Final    RDW 06/06/2022 14.3  11.5 - 14.5 % Final    Platelets 06/06/2022 250  150 - 450 K/uL Final    MPV 06/06/2022 10.1  9.2 - 12.9 fL Final    Immature Granulocytes 06/06/2022 0.2  0.0 - 0.5 % Final    Gran # (ANC) 06/06/2022 3.2  1.8 - 7.7 K/uL Final    Immature Grans (Abs) 06/06/2022 0.01  0.00 - 0.04 K/uL Final    Lymph # 06/06/2022 2.4  1.0 - 4.8 K/uL Final    Mono # 06/06/2022 0.7  0.3 - 1.0 K/uL Final    Eos # 06/06/2022 0.3  0.0 - 0.5 K/uL Final    Baso # 06/06/2022 0.06  0.00 - 0.20 K/uL Final    nRBC 06/06/2022 0  0 /100 WBC Final    Gran % 06/06/2022 48.3  38.0 - 73.0 % Final    Lymph % 06/06/2022 36.0  18.0 - 48.0 % Final    Mono % 06/06/2022 10.7  4.0 - 15.0 % Final    Eosinophil % 06/06/2022 3.9  0.0 - 8.0 % Final    Basophil % 06/06/2022 0.9  0.0 - 1.9 % Final    Differential Method 06/06/2022 Automated   Final    Cholesterol 06/06/2022 196  120 - 199 mg/dL Final    Triglycerides 06/06/2022 98  30 - 150 mg/dL Final    HDL 06/06/2022 55  40 - 75 mg/dL Final    LDL Cholesterol 06/06/2022 121.4  63.0 - 159.0 mg/dL Final    HDL/Cholesterol Ratio 06/06/2022 28.1  20.0 - 50.0 % Final    Total Cholesterol/HDL Ratio 06/06/2022 3.6  2.0 - 5.0 Final    Non-HDL Cholesterol 06/06/2022 141   mg/dL Final    Sodium 06/06/2022 141  136 - 145 mmol/L Final    Potassium 06/06/2022 4.3  3.5 - 5.1 mmol/L Final    Chloride 06/06/2022 106  95 - 110 mmol/L Final    CO2 06/06/2022 26  23 - 29 mmol/L Final    Glucose 06/06/2022 72  70 - 110 mg/dL Final    BUN 06/06/2022 16  6 - 20 mg/dL Final    Creatinine 06/06/2022 1.1  0.5 - 1.4 mg/dL Final    Calcium 06/06/2022 9.6  8.7 - 10.5 mg/dL Final    Total Protein 06/06/2022 6.7  6.0 - 8.4 g/dL Final    Albumin 06/06/2022 4.1  3.5 - 5.2 g/dL Final    Total Bilirubin 06/06/2022 0.5  0.1 - 1.0 mg/dL Final    Alkaline Phosphatase 06/06/2022 39 (L)  55 - 135 U/L Final    AST 06/06/2022 16  10 - 40 U/L Final    ALT 06/06/2022 13  10 - 44 U/L Final    Anion Gap 06/06/2022 9  8 - 16 mmol/L Final    eGFR if African American 06/06/2022 >60.0  >60 mL/min/1.73 m^2 Final    eGFR if non African American 06/06/2022 >60.0  >60 mL/min/1.73 m^2 Final     Assessment:       1. Fever, unspecified fever cause    2. Acute cough          Plan:     Onset 3 days ago fever chills cough sore throat.  He feels somewhat better today.  Several coworkers recently with same symptoms.  He will need POCT influenza B is CT COVID screening.  He will get this done and based on report will make recommendations.    Fever, unspecified fever cause  -     POCT Influenza A/B Molecular  -     POCT COVID-19 Rapid Screening; Future; Expected date: 11/09/2022    Acute cough  -     POCT Influenza A/B Molecular  -     POCT COVID-19 Rapid Screening; Future; Expected date: 11/09/2022

## 2022-11-09 NOTE — TELEPHONE ENCOUNTER
Patient arrived to clinic to have POCT COVID and influenza testing. Staff completed both but was unable to document on influenza. New order put in to be sent to Dr. Blackwood

## 2022-11-10 ENCOUNTER — PATIENT MESSAGE (OUTPATIENT)
Dept: INTERNAL MEDICINE | Facility: CLINIC | Age: 52
End: 2022-11-10
Payer: COMMERCIAL

## 2022-11-10 ENCOUNTER — TELEPHONE (OUTPATIENT)
Dept: INTERNAL MEDICINE | Facility: CLINIC | Age: 52
End: 2022-11-10
Payer: COMMERCIAL

## 2022-11-10 RX ORDER — PROMETHAZINE HYDROCHLORIDE AND DEXTROMETHORPHAN HYDROBROMIDE 6.25; 15 MG/5ML; MG/5ML
5 SYRUP ORAL 3 TIMES DAILY
Qty: 150 ML | Refills: 0 | Status: SHIPPED | OUTPATIENT
Start: 2022-11-10 | End: 2022-11-20

## 2022-11-10 NOTE — TELEPHONE ENCOUNTER
Returned call to patient regarding medication,  no answer left voice message. Awaiting return call.

## 2022-12-05 RX ORDER — AMLODIPINE BESYLATE 10 MG/1
TABLET ORAL
Qty: 30 TABLET | Refills: 0 | Status: SHIPPED | OUTPATIENT
Start: 2022-12-05 | End: 2023-01-18

## 2023-05-08 ENCOUNTER — OFFICE VISIT (OUTPATIENT)
Dept: INTERNAL MEDICINE | Facility: CLINIC | Age: 53
End: 2023-05-08
Payer: COMMERCIAL

## 2023-05-08 VITALS
BODY MASS INDEX: 27.17 KG/M2 | HEART RATE: 88 BPM | SYSTOLIC BLOOD PRESSURE: 138 MMHG | WEIGHT: 205 LBS | TEMPERATURE: 99 F | OXYGEN SATURATION: 97 % | DIASTOLIC BLOOD PRESSURE: 88 MMHG | HEIGHT: 73 IN

## 2023-05-08 DIAGNOSIS — I10 ESSENTIAL HYPERTENSION: ICD-10-CM

## 2023-05-08 DIAGNOSIS — G43.009 MIGRAINE WITHOUT AURA AND WITHOUT STATUS MIGRAINOSUS, NOT INTRACTABLE: Primary | ICD-10-CM

## 2023-05-08 DIAGNOSIS — J30.9 CHRONIC ALLERGIC RHINITIS: ICD-10-CM

## 2023-05-08 PROBLEM — R51.9 CHRONIC DAILY HEADACHE: Status: RESOLVED | Noted: 2023-05-08 | Resolved: 2023-05-08

## 2023-05-08 PROBLEM — R51.9 CHRONIC DAILY HEADACHE: Status: ACTIVE | Noted: 2023-05-08

## 2023-05-08 PROBLEM — R05.1 ACUTE COUGH: Status: RESOLVED | Noted: 2022-11-09 | Resolved: 2023-05-08

## 2023-05-08 PROCEDURE — 1160F PR REVIEW ALL MEDS BY PRESCRIBER/CLIN PHARMACIST DOCUMENTED: ICD-10-PCS | Mod: CPTII,S$GLB,, | Performed by: NURSE PRACTITIONER

## 2023-05-08 PROCEDURE — 1159F MED LIST DOCD IN RCRD: CPT | Mod: CPTII,S$GLB,, | Performed by: NURSE PRACTITIONER

## 2023-05-08 PROCEDURE — 3079F PR MOST RECENT DIASTOLIC BLOOD PRESSURE 80-89 MM HG: ICD-10-PCS | Mod: CPTII,S$GLB,, | Performed by: NURSE PRACTITIONER

## 2023-05-08 PROCEDURE — 3079F DIAST BP 80-89 MM HG: CPT | Mod: CPTII,S$GLB,, | Performed by: NURSE PRACTITIONER

## 2023-05-08 PROCEDURE — 99213 PR OFFICE/OUTPT VISIT, EST, LEVL III, 20-29 MIN: ICD-10-PCS | Mod: S$GLB,,, | Performed by: NURSE PRACTITIONER

## 2023-05-08 PROCEDURE — 99999 PR PBB SHADOW E&M-EST. PATIENT-LVL IV: CPT | Mod: PBBFAC,,, | Performed by: NURSE PRACTITIONER

## 2023-05-08 PROCEDURE — 3075F PR MOST RECENT SYSTOLIC BLOOD PRESS GE 130-139MM HG: ICD-10-PCS | Mod: CPTII,S$GLB,, | Performed by: NURSE PRACTITIONER

## 2023-05-08 PROCEDURE — 3008F PR BODY MASS INDEX (BMI) DOCUMENTED: ICD-10-PCS | Mod: CPTII,S$GLB,, | Performed by: NURSE PRACTITIONER

## 2023-05-08 PROCEDURE — 3075F SYST BP GE 130 - 139MM HG: CPT | Mod: CPTII,S$GLB,, | Performed by: NURSE PRACTITIONER

## 2023-05-08 PROCEDURE — 1160F RVW MEDS BY RX/DR IN RCRD: CPT | Mod: CPTII,S$GLB,, | Performed by: NURSE PRACTITIONER

## 2023-05-08 PROCEDURE — 3008F BODY MASS INDEX DOCD: CPT | Mod: CPTII,S$GLB,, | Performed by: NURSE PRACTITIONER

## 2023-05-08 PROCEDURE — 99999 PR PBB SHADOW E&M-EST. PATIENT-LVL IV: ICD-10-PCS | Mod: PBBFAC,,, | Performed by: NURSE PRACTITIONER

## 2023-05-08 PROCEDURE — 99213 OFFICE O/P EST LOW 20 MIN: CPT | Mod: S$GLB,,, | Performed by: NURSE PRACTITIONER

## 2023-05-08 PROCEDURE — 1159F PR MEDICATION LIST DOCUMENTED IN MEDICAL RECORD: ICD-10-PCS | Mod: CPTII,S$GLB,, | Performed by: NURSE PRACTITIONER

## 2023-05-08 RX ORDER — BUTALBITAL, ACETAMINOPHEN AND CAFFEINE 50; 325; 40 MG/1; MG/1; MG/1
1 TABLET ORAL EVERY 6 HOURS PRN
Qty: 20 TABLET | Refills: 0 | Status: SHIPPED | OUTPATIENT
Start: 2023-05-08 | End: 2023-06-19

## 2023-05-08 RX ORDER — FLUTICASONE PROPIONATE 50 MCG
1 SPRAY, SUSPENSION (ML) NASAL DAILY
Qty: 16 G | Refills: 0 | Status: SHIPPED | OUTPATIENT
Start: 2023-05-08

## 2023-05-08 RX ORDER — METHYLPREDNISOLONE 4 MG/1
TABLET ORAL
Qty: 21 EACH | Refills: 0 | Status: SHIPPED | OUTPATIENT
Start: 2023-05-08 | End: 2023-05-29

## 2023-05-08 RX ORDER — PROPRANOLOL HYDROCHLORIDE 20 MG/1
20 TABLET ORAL 2 TIMES DAILY
Qty: 60 TABLET | Refills: 0 | Status: SHIPPED | OUTPATIENT
Start: 2023-05-08 | End: 2023-06-05

## 2023-05-08 RX ORDER — MONTELUKAST SODIUM 10 MG/1
10 TABLET ORAL NIGHTLY
Qty: 30 TABLET | Refills: 0 | Status: SHIPPED | OUTPATIENT
Start: 2023-05-08 | End: 2023-06-19

## 2023-05-08 NOTE — PROGRESS NOTES
Subjective:       Patient ID: Alessandro Olivares Jr. is a 53 y.o. male.    Chief Complaint: Migraine    Mr. Olivares presents to clinic for headache x 3 days. Hx chronic migraines and headaches. 3-4 migraines/month and 3-4 headaches/week. Headaches and migraines usually resolve with OTC medication. Never been on medication for migraine preventation    Current headache has persisted x 3 days. Headache to frontal and bilateral temples. Headache has slightly improved today. Pain down to 4/10. Previously 10/10. +nausea and photophobia. Also has hx chronic allergies. States he is always congestion with intermittent runny nose. Denies fever, chills, or myalgia    Migraine   This is a recurrent problem. The current episode started in the past 7 days. The problem occurs constantly. The problem has been gradually improving. The pain is located in the Frontal region. The pain quality is not similar to prior headaches. The quality of the pain is described as aching, shooting and stabbing. The pain is at a severity of 4/10. Associated symptoms include eye redness (yesterday), nausea and sinus pressure. Pertinent negatives include no abdominal pain, blurred vision, coughing, dizziness, drainage, ear pain, eye pain, eye watering, facial sweating, fever, loss of balance, muscle aches, neck pain, numbness, phonophobia, photophobia, rhinorrhea, scalp tenderness, sore throat, tingling, tinnitus, visual change, vomiting or weakness. Treatments tried: congestion and otc analagesics. The treatment provided mild relief. His past medical history is significant for migraine headaches and sinus disease.     Patient Active Problem List   Diagnosis    Chronic allergic rhinitis    Nasal polyps    Muscle strain    Migraine without status migrainosus, not intractable    Essential hypertension    Elevated cholesterol with elevated triglycerides    Lumbar radiculopathy       Family History   Problem Relation Age of Onset    Diabetes Mother     Heart  attack Mother     Cataracts Mother     Hypertension Father      Past Surgical History:   Procedure Laterality Date    COLONOSCOPY  11/20/2020    hem, otherwise nl exam, repeat 10 yrs; Tameka Burris MD    COLONOSCOPY N/A 11/20/2020    Procedure: COLONOSCOPY;  Surgeon: Tameka Burris MD;  Location: [facility] ENDO;  Service: Endoscopy;  Laterality: N/A;    ESOPHAGOGASTRODUODENOSCOPY N/A 11/20/2020    Procedure: EGD (ESOPHAGOGASTRODUODENOSCOPY);  Surgeon: Tameka Burris MD;  Location: [facility] ENDO;  Service: Endoscopy;  Laterality: N/A;    NASAL DILATION      SELECTIVE INJECTION OF ANESTHETIC AGENT AROUND LUMBAR SPINAL NERVE ROOT BY TRANSFORAMINAL APPROACH Right 2/25/2021    Procedure: BLOCK, SPINAL NERVE ROOT, LUMBAR, SELECTIVE, TRANSFORAMINAL APPROACH Right L4-5 L5-S1 RN IV sedation;  Surgeon: Parker Palomo MD;  Location: [facility];  Service: Pain Management;  Laterality: Right;    SELECTIVE INJECTION OF ANESTHETIC AGENT AROUND LUMBAR SPINAL NERVE ROOT BY TRANSFORAMINAL APPROACH Right 3/31/2021    Procedure: BLOCK, SPINAL NERVE ROOT, LUMBAR, SELECTIVE, TRANSFORAMINAL APPROACH RIght L4-5, L5-S1 RN IV sedation;  Surgeon: Parker Palomo MD;  Location: [facility];  Service: Pain Management;  Laterality: Right;    TONSILLECTOMY           Current Outpatient Medications:     amLODIPine (NORVASC) 10 MG tablet, TAKE 1 TABLET BY MOUTH ONCE DAILY; NEEDS APPOINTMENT FOR MORE REFILLS, Disp: 30 tablet, Rfl: 0    butalbital-acetaminophen-caffeine -40 mg (FIORICET, ESGIC) -40 mg per tablet, Take 1 tablet by mouth every 6 (six) hours as needed for Headaches., Disp: 20 tablet, Rfl: 0    fluticasone propionate (FLONASE) 50 mcg/actuation nasal spray, 1 spray (50 mcg total) by Each Nostril route once daily., Disp: 16 g, Rfl: 0    methylPREDNISolone (MEDROL DOSEPACK) 4 mg tablet, use as directed, Disp: 21 each, Rfl: 0    montelukast (SINGULAIR) 10 mg tablet, Take 1 tablet (10 mg total) by mouth every evening., Disp: 30 tablet,  "Rfl: 0    propranoloL (INDERAL) 20 MG tablet, Take 1 tablet (20 mg total) by mouth 2 (two) times daily., Disp: 60 tablet, Rfl: 0    Review of Systems   Constitutional:  Negative for chills and fever.   HENT:  Positive for sinus pressure. Negative for ear pain, rhinorrhea, sore throat and tinnitus.    Eyes:  Positive for redness (yesterday). Negative for blurred vision, photophobia and pain.   Respiratory:  Negative for cough.    Cardiovascular:  Negative for chest pain.   Gastrointestinal:  Positive for nausea. Negative for abdominal pain and vomiting.   Musculoskeletal:  Negative for myalgias and neck pain.   Neurological:  Positive for headaches. Negative for dizziness, tingling, tremors, syncope, facial asymmetry, speech difficulty, weakness, light-headedness, numbness and loss of balance.     Objective:   /88 (BP Location: Left arm, Patient Position: Sitting, BP Method: Medium (Manual))   Pulse 88   Temp 98.8 °F (37.1 °C)   Ht 6' 1" (1.854 m)   Wt 93 kg (205 lb 0.4 oz)   SpO2 97%   BMI 27.05 kg/m²      Physical Exam  Constitutional:       General: He is not in acute distress.     Appearance: Normal appearance. He is not ill-appearing or diaphoretic.   HENT:      Head: Normocephalic and atraumatic.      Comments: No TTP to bilateral temples, no palpable cords     Right Ear: Tympanic membrane, ear canal and external ear normal.      Left Ear: Tympanic membrane, ear canal and external ear normal.      Mouth/Throat:      Mouth: Mucous membranes are moist.      Pharynx: Oropharynx is clear. No posterior oropharyngeal erythema.   Eyes:      General:         Right eye: No discharge.         Left eye: No discharge.      Extraocular Movements: Extraocular movements intact.      Conjunctiva/sclera: Conjunctivae normal.      Pupils: Pupils are equal, round, and reactive to light.   Cardiovascular:      Rate and Rhythm: Normal rate and regular rhythm.      Heart sounds: Normal heart sounds.   Pulmonary:      " Effort: Pulmonary effort is normal. No respiratory distress.      Breath sounds: Normal breath sounds. No wheezing, rhonchi or rales.   Musculoskeletal:         General: Normal range of motion.      Cervical back: Normal range of motion. No rigidity.   Skin:     General: Skin is warm and dry.      Findings: No erythema or rash.   Neurological:      General: No focal deficit present.      Mental Status: He is alert and oriented to person, place, and time.      Cranial Nerves: No cranial nerve deficit.      Sensory: No sensory deficit.      Motor: No weakness.      Coordination: Coordination normal.      Gait: Gait normal.   Psychiatric:         Mood and Affect: Mood normal.         Behavior: Behavior normal.       Assessment & Plan     1. Migraine without aura and without status migrainosus, not intractable  Assessment & Plan:  Acute headache x 3 days. Neuro exam intact, no concerning findings. Hx chronic migraines. 15-20 headache days per month. Oral steroid and Fioricet PRN for acute headache. Discussed risk of rebound headaches with frequent Fioricet use. Discussed medications for migraine prevention at length. Start propranolol 20 mg BID. Reviewed potential SEs. Advised to monitor BP and HR.    Orders:  -     methylPREDNISolone (MEDROL DOSEPACK) 4 mg tablet; use as directed  Dispense: 21 each; Refill: 0  -     butalbital-acetaminophen-caffeine -40 mg (FIORICET, ESGIC) -40 mg per tablet; Take 1 tablet by mouth every 6 (six) hours as needed for Headaches.  Dispense: 20 tablet; Refill: 0  -     propranoloL (INDERAL) 20 MG tablet; Take 1 tablet (20 mg total) by mouth 2 (two) times daily.  Dispense: 60 tablet; Refill: 0    2. Chronic allergic rhinitis  Assessment & Plan:  Possible cause of acute headache. Start Flonase and Singulair daily. Oral steroid for acute escalation. No clinical indication for antibiotic at this time    Orders:  -     methylPREDNISolone (MEDROL DOSEPACK) 4 mg tablet; use as directed  " Dispense: 21 each; Refill: 0  -     montelukast (SINGULAIR) 10 mg tablet; Take 1 tablet (10 mg total) by mouth every evening.  Dispense: 30 tablet; Refill: 0  -     fluticasone propionate (FLONASE) 50 mcg/actuation nasal spray; 1 spray (50 mcg total) by Each Nostril route once daily.  Dispense: 16 g; Refill: 0    3. Essential hypertension  Assessment & Plan:  BP initially elevated. Improved with repeat reading. Continue amlodipine. Start propranolol for migraine prevention and also assist with BP.          PO Newton      Portions of this note may have been created with voice recognition software. Occasional "wrong-word" or "sound-a-like" substitutions may have occurred due to the inherent limitations of voice recognition software. Please, read the note carefully and recognize, using context, where substitutions have occurred.     "

## 2023-05-08 NOTE — ASSESSMENT & PLAN NOTE
Possible cause of acute headache. Start Flonase and Singulair daily. Oral steroid for acute escalation. No clinical indication for antibiotic at this time

## 2023-05-08 NOTE — ASSESSMENT & PLAN NOTE
Acute headache x 3 days. Neuro exam intact, no concerning findings. Hx chronic migraines. 15-20 headache days per month. Oral steroid and Fioricet PRN for acute headache. Discussed risk of rebound headaches with frequent Fioricet use. Discussed medications for migraine prevention at length. Start propranolol 20 mg BID. Reviewed potential SEs. Advised to monitor BP and HR.

## 2023-05-08 NOTE — ASSESSMENT & PLAN NOTE
BP initially elevated. Improved with repeat reading. Continue amlodipine. Start propranolol for migraine prevention and also assist with BP.

## 2023-06-05 ENCOUNTER — OFFICE VISIT (OUTPATIENT)
Dept: INTERNAL MEDICINE | Facility: CLINIC | Age: 53
End: 2023-06-05
Payer: COMMERCIAL

## 2023-06-05 VITALS
TEMPERATURE: 98 F | SYSTOLIC BLOOD PRESSURE: 140 MMHG | BODY MASS INDEX: 26.94 KG/M2 | WEIGHT: 203.25 LBS | HEIGHT: 73 IN | HEART RATE: 69 BPM | DIASTOLIC BLOOD PRESSURE: 96 MMHG | OXYGEN SATURATION: 98 %

## 2023-06-05 DIAGNOSIS — G43.909 MIGRAINE WITHOUT STATUS MIGRAINOSUS, NOT INTRACTABLE, UNSPECIFIED MIGRAINE TYPE: ICD-10-CM

## 2023-06-05 DIAGNOSIS — I10 ESSENTIAL HYPERTENSION: ICD-10-CM

## 2023-06-05 DIAGNOSIS — J30.9 CHRONIC ALLERGIC RHINITIS: ICD-10-CM

## 2023-06-05 DIAGNOSIS — J33.9 NASAL POLYPS: ICD-10-CM

## 2023-06-05 DIAGNOSIS — J01.10 ACUTE NON-RECURRENT FRONTAL SINUSITIS: Primary | ICD-10-CM

## 2023-06-05 PROBLEM — E78.2 ELEVATED CHOLESTEROL WITH ELEVATED TRIGLYCERIDES: Status: RESOLVED | Noted: 2020-09-14 | Resolved: 2023-06-05

## 2023-06-05 PROBLEM — T14.8XXA MUSCLE STRAIN: Status: RESOLVED | Noted: 2018-10-22 | Resolved: 2023-06-05

## 2023-06-05 PROCEDURE — 3008F BODY MASS INDEX DOCD: CPT | Mod: CPTII,S$GLB,, | Performed by: NURSE PRACTITIONER

## 2023-06-05 PROCEDURE — 1159F PR MEDICATION LIST DOCUMENTED IN MEDICAL RECORD: ICD-10-PCS | Mod: CPTII,S$GLB,, | Performed by: NURSE PRACTITIONER

## 2023-06-05 PROCEDURE — 99999 PR PBB SHADOW E&M-EST. PATIENT-LVL IV: ICD-10-PCS | Mod: PBBFAC,,, | Performed by: NURSE PRACTITIONER

## 2023-06-05 PROCEDURE — 3008F PR BODY MASS INDEX (BMI) DOCUMENTED: ICD-10-PCS | Mod: CPTII,S$GLB,, | Performed by: NURSE PRACTITIONER

## 2023-06-05 PROCEDURE — 99214 OFFICE O/P EST MOD 30 MIN: CPT | Mod: S$GLB,,, | Performed by: NURSE PRACTITIONER

## 2023-06-05 PROCEDURE — 1160F PR REVIEW ALL MEDS BY PRESCRIBER/CLIN PHARMACIST DOCUMENTED: ICD-10-PCS | Mod: CPTII,S$GLB,, | Performed by: NURSE PRACTITIONER

## 2023-06-05 PROCEDURE — 1159F MED LIST DOCD IN RCRD: CPT | Mod: CPTII,S$GLB,, | Performed by: NURSE PRACTITIONER

## 2023-06-05 PROCEDURE — 99214 PR OFFICE/OUTPT VISIT, EST, LEVL IV, 30-39 MIN: ICD-10-PCS | Mod: S$GLB,,, | Performed by: NURSE PRACTITIONER

## 2023-06-05 PROCEDURE — 3077F PR MOST RECENT SYSTOLIC BLOOD PRESSURE >= 140 MM HG: ICD-10-PCS | Mod: CPTII,S$GLB,, | Performed by: NURSE PRACTITIONER

## 2023-06-05 PROCEDURE — 3077F SYST BP >= 140 MM HG: CPT | Mod: CPTII,S$GLB,, | Performed by: NURSE PRACTITIONER

## 2023-06-05 PROCEDURE — 3080F PR MOST RECENT DIASTOLIC BLOOD PRESSURE >= 90 MM HG: ICD-10-PCS | Mod: CPTII,S$GLB,, | Performed by: NURSE PRACTITIONER

## 2023-06-05 PROCEDURE — 99999 PR PBB SHADOW E&M-EST. PATIENT-LVL IV: CPT | Mod: PBBFAC,,, | Performed by: NURSE PRACTITIONER

## 2023-06-05 PROCEDURE — 1160F RVW MEDS BY RX/DR IN RCRD: CPT | Mod: CPTII,S$GLB,, | Performed by: NURSE PRACTITIONER

## 2023-06-05 PROCEDURE — 3080F DIAST BP >= 90 MM HG: CPT | Mod: CPTII,S$GLB,, | Performed by: NURSE PRACTITIONER

## 2023-06-05 RX ORDER — AMOXICILLIN AND CLAVULANATE POTASSIUM 875; 125 MG/1; MG/1
1 TABLET, FILM COATED ORAL EVERY 12 HOURS
Qty: 14 TABLET | Refills: 0 | Status: SHIPPED | OUTPATIENT
Start: 2023-06-05 | End: 2023-06-19

## 2023-06-05 RX ORDER — CHLORTHALIDONE 25 MG/1
25 TABLET ORAL DAILY
Qty: 90 TABLET | Refills: 0 | Status: SHIPPED | OUTPATIENT
Start: 2023-06-05 | End: 2023-11-27

## 2023-06-05 NOTE — PROGRESS NOTES
Subjective:       Patient ID: Alessandro Olivares Jr. is a 53 y.o. male.    Chief Complaint: Migraine and Hypertension    Mr. Olivares presents to clinic for persistent headache, sinus issues, and hypertension.. He was last seen in clinic on 05/08/2023 for headache x3 days.  He was started on Medrol Dosepak, Flonase, and Singulair for escalation in chronic sinus congestion.  He was also started on propranolol for migraine prevention.  Today reports headache improved while taking steroids, but then returned after completing.  Reports no improvement with propranolol.  Started sinus rinses which has provided some relief.  Headache down to 3/10.  Previously 10/10. Concerned headache maybe related to sinusitis. Seen by ENT in past for BPPV.     Blood pressure elevated today in clinic with elevated home BP readings.  Reports compliance with amlodipine.  Previously on indapamide which was discontinued due to palpitations and anxiety.    Patient Active Problem List   Diagnosis    Chronic allergic rhinitis    Nasal polyps    Migraine without status migrainosus, not intractable    Essential hypertension    Lumbar radiculopathy       Family History   Problem Relation Age of Onset    Diabetes Mother     Heart attack Mother     Cataracts Mother     Hypertension Father      Past Surgical History:   Procedure Laterality Date    COLONOSCOPY  11/20/2020    hem, otherwise nl exam, repeat 10 yrs; Tameka Burris MD    COLONOSCOPY N/A 11/20/2020    Procedure: COLONOSCOPY;  Surgeon: Tameka Burris MD;  Location: [facility];  Service: Endoscopy;  Laterality: N/A;    ESOPHAGOGASTRODUODENOSCOPY N/A 11/20/2020    Procedure: EGD (ESOPHAGOGASTRODUODENOSCOPY);  Surgeon: Tameka Burris MD;  Location: [facility];  Service: Endoscopy;  Laterality: N/A;    NASAL DILATION      SELECTIVE INJECTION OF ANESTHETIC AGENT AROUND LUMBAR SPINAL NERVE ROOT BY TRANSFORAMINAL APPROACH Right 2/25/2021    Procedure: BLOCK, SPINAL NERVE ROOT, LUMBAR, SELECTIVE,  TRANSFORAMINAL APPROACH Right L4-5 L5-S1 RN IV sedation;  Surgeon: Parker Palomo MD;  Location: [facility];  Service: Pain Management;  Laterality: Right;    SELECTIVE INJECTION OF ANESTHETIC AGENT AROUND LUMBAR SPINAL NERVE ROOT BY TRANSFORAMINAL APPROACH Right 3/31/2021    Procedure: BLOCK, SPINAL NERVE ROOT, LUMBAR, SELECTIVE, TRANSFORAMINAL APPROACH RIght L4-5, L5-S1 RN IV sedation;  Surgeon: Parker Palomo MD;  Location: [facility];  Service: Pain Management;  Laterality: Right;    TONSILLECTOMY           Current Outpatient Medications:     amLODIPine (NORVASC) 10 MG tablet, TAKE 1 TABLET BY MOUTH ONCE DAILY; NEEDS APPOINTMENT FOR MORE REFILLS, Disp: 30 tablet, Rfl: 0    butalbital-acetaminophen-caffeine -40 mg (FIORICET, ESGIC) -40 mg per tablet, Take 1 tablet by mouth every 6 (six) hours as needed for Headaches., Disp: 20 tablet, Rfl: 0    fluticasone propionate (FLONASE) 50 mcg/actuation nasal spray, 1 spray (50 mcg total) by Each Nostril route once daily., Disp: 16 g, Rfl: 0    montelukast (SINGULAIR) 10 mg tablet, Take 1 tablet (10 mg total) by mouth every evening., Disp: 30 tablet, Rfl: 0    amoxicillin-clavulanate 875-125mg (AUGMENTIN) 875-125 mg per tablet, Take 1 tablet by mouth every 12 (twelve) hours. for 7 days, Disp: 14 tablet, Rfl: 0    chlorthalidone (HYGROTEN) 25 MG Tab, Take 1 tablet (25 mg total) by mouth once daily., Disp: 90 tablet, Rfl: 0    Review of Systems   Constitutional:  Negative for chills and fever.   HENT:  Negative for ear pain, rhinorrhea, sinus pressure, sore throat and tinnitus.    Eyes:  Negative for photophobia, pain and redness.   Respiratory:  Negative for cough and shortness of breath.    Cardiovascular:  Negative for chest pain, palpitations and leg swelling.   Gastrointestinal:  Negative for abdominal pain, nausea and vomiting.   Musculoskeletal:  Negative for myalgias and neck pain.   Neurological:  Positive for headaches. Negative for dizziness,  "tremors, syncope, facial asymmetry, speech difficulty, weakness, light-headedness and numbness.     Objective:   BP (!) 140/96 (BP Location: Left arm, Patient Position: Sitting, BP Method: Large (Manual))   Pulse 69   Temp 97.7 °F (36.5 °C)   Ht 6' 1" (1.854 m)   Wt 92.2 kg (203 lb 4.2 oz)   SpO2 98%   BMI 26.82 kg/m²      Physical Exam  Vitals reviewed.   Constitutional:       General: He is not in acute distress.     Appearance: Normal appearance. He is not ill-appearing.   HENT:      Head: Normocephalic and atraumatic.   Eyes:      Extraocular Movements: Extraocular movements intact.   Cardiovascular:      Rate and Rhythm: Normal rate.   Pulmonary:      Effort: Pulmonary effort is normal. No respiratory distress.   Skin:     General: Skin is warm and dry.      Findings: No erythema or rash.   Neurological:      Mental Status: He is alert and oriented to person, place, and time.      Coordination: Coordination normal.      Gait: Gait normal.   Psychiatric:         Behavior: Behavior normal.       Assessment & Plan     1. Acute non-recurrent frontal sinusitis  Comments:  Continue symptomatic care. Start Augmentin. Consider referral to ENT  if symptoms persist.  Orders:  -     amoxicillin-clavulanate 875-125mg (AUGMENTIN) 875-125 mg per tablet; Take 1 tablet by mouth every 12 (twelve) hours. for 7 days  Dispense: 14 tablet; Refill: 0    2. Essential hypertension  Assessment & Plan:  BP remains elevated with elevated home readings. Continue amlodipine. Start chlorthalidone. RTC in 2 weeks. Due for routine labs at follow up appointment    Orders:  -     chlorthalidone (HYGROTEN) 25 MG Tab; Take 1 tablet (25 mg total) by mouth once daily.  Dispense: 90 tablet; Refill: 0    3. Migraine without status migrainosus, not intractable, unspecified migraine type  Assessment & Plan:  No change in headache pattern with propranolol. Start antibiotic for possible sinusitis headache. Advised to keep a headache/migraine log. " "Previous headache was with associated nausea and photophobia which is more consistent with migraine. If headaches persist, will try starting another preventative migraine medications.      4. Chronic allergic rhinitis  Assessment & Plan:  Continue Flonase and Singulair. Start Augmentin for acute sinusitis      5. Nasal polyps  Assessment & Plan:  Seen by ENT in past. Consider following up with ENT if symptoms persist.            PO Newton      Portions of this note may have been created with voice recognition software. Occasional "wrong-word" or "sound-a-like" substitutions may have occurred due to the inherent limitations of voice recognition software. Please, read the note carefully and recognize, using context, where substitutions have occurred.     "

## 2023-06-05 NOTE — ASSESSMENT & PLAN NOTE
No change in headache pattern with propranolol. Start antibiotic for possible sinusitis headache. Advised to keep a headache/migraine log. Previous headache was with associated nausea and photophobia which is more consistent with migraine. If headaches persist, will try starting another preventative migraine medications.

## 2023-06-05 NOTE — ASSESSMENT & PLAN NOTE
BP remains elevated with elevated home readings. Continue amlodipine. Start chlorthalidone. RTC in 2 weeks. Due for routine labs at follow up appointment

## 2023-06-19 ENCOUNTER — OFFICE VISIT (OUTPATIENT)
Dept: INTERNAL MEDICINE | Facility: CLINIC | Age: 53
End: 2023-06-19
Payer: COMMERCIAL

## 2023-06-19 ENCOUNTER — LAB VISIT (OUTPATIENT)
Dept: LAB | Facility: HOSPITAL | Age: 53
End: 2023-06-19
Attending: NURSE PRACTITIONER
Payer: COMMERCIAL

## 2023-06-19 VITALS
HEIGHT: 73 IN | SYSTOLIC BLOOD PRESSURE: 124 MMHG | WEIGHT: 198.19 LBS | OXYGEN SATURATION: 96 % | DIASTOLIC BLOOD PRESSURE: 92 MMHG | BODY MASS INDEX: 26.27 KG/M2 | TEMPERATURE: 99 F | HEART RATE: 74 BPM

## 2023-06-19 DIAGNOSIS — Z12.5 PROSTATE CANCER SCREENING: ICD-10-CM

## 2023-06-19 DIAGNOSIS — Z13.220 SCREENING FOR CHOLESTEROL LEVEL: ICD-10-CM

## 2023-06-19 DIAGNOSIS — G43.709 CHRONIC MIGRAINE WITHOUT AURA WITHOUT STATUS MIGRAINOSUS, NOT INTRACTABLE: ICD-10-CM

## 2023-06-19 DIAGNOSIS — Z00.00 ROUTINE ADULT HEALTH MAINTENANCE: ICD-10-CM

## 2023-06-19 DIAGNOSIS — Z13.1 DIABETES MELLITUS SCREENING: ICD-10-CM

## 2023-06-19 DIAGNOSIS — Z87.09 HISTORY OF SINUS PROBLEM: ICD-10-CM

## 2023-06-19 DIAGNOSIS — R53.83 FATIGUE, UNSPECIFIED TYPE: ICD-10-CM

## 2023-06-19 DIAGNOSIS — I10 ESSENTIAL HYPERTENSION: Primary | ICD-10-CM

## 2023-06-19 DIAGNOSIS — J33.9 NASAL POLYPS: ICD-10-CM

## 2023-06-19 DIAGNOSIS — I10 ESSENTIAL HYPERTENSION: ICD-10-CM

## 2023-06-19 DIAGNOSIS — J30.9 CHRONIC ALLERGIC RHINITIS: ICD-10-CM

## 2023-06-19 LAB
ALBUMIN SERPL BCP-MCNC: 4.5 G/DL (ref 3.5–5.2)
ALP SERPL-CCNC: 47 U/L (ref 55–135)
ALT SERPL W/O P-5'-P-CCNC: 19 U/L (ref 10–44)
ANION GAP SERPL CALC-SCNC: 9 MMOL/L (ref 8–16)
AST SERPL-CCNC: 17 U/L (ref 10–40)
BASOPHILS # BLD AUTO: 0.05 K/UL (ref 0–0.2)
BASOPHILS NFR BLD: 0.7 % (ref 0–1.9)
BILIRUB SERPL-MCNC: 0.3 MG/DL (ref 0.1–1)
BUN SERPL-MCNC: 18 MG/DL (ref 6–20)
CALCIUM SERPL-MCNC: 10.3 MG/DL (ref 8.7–10.5)
CHLORIDE SERPL-SCNC: 103 MMOL/L (ref 95–110)
CO2 SERPL-SCNC: 28 MMOL/L (ref 23–29)
CREAT SERPL-MCNC: 1 MG/DL (ref 0.5–1.4)
DIFFERENTIAL METHOD: ABNORMAL
EOSINOPHIL # BLD AUTO: 0.2 K/UL (ref 0–0.5)
EOSINOPHIL NFR BLD: 3 % (ref 0–8)
ERYTHROCYTE [DISTWIDTH] IN BLOOD BY AUTOMATED COUNT: 15 % (ref 11.5–14.5)
EST. GFR  (NO RACE VARIABLE): >60 ML/MIN/1.73 M^2
ESTIMATED AVG GLUCOSE: 111 MG/DL (ref 68–131)
GLUCOSE SERPL-MCNC: 106 MG/DL (ref 70–110)
HBA1C MFR BLD: 5.5 % (ref 4–5.6)
HCT VFR BLD AUTO: 44.6 % (ref 40–54)
HGB BLD-MCNC: 15 G/DL (ref 14–18)
IMM GRANULOCYTES # BLD AUTO: 0.02 K/UL (ref 0–0.04)
IMM GRANULOCYTES NFR BLD AUTO: 0.3 % (ref 0–0.5)
LYMPHOCYTES # BLD AUTO: 2.5 K/UL (ref 1–4.8)
LYMPHOCYTES NFR BLD: 32.7 % (ref 18–48)
MCH RBC QN AUTO: 29.4 PG (ref 27–31)
MCHC RBC AUTO-ENTMCNC: 33.6 G/DL (ref 32–36)
MCV RBC AUTO: 87 FL (ref 82–98)
MONOCYTES # BLD AUTO: 0.8 K/UL (ref 0.3–1)
MONOCYTES NFR BLD: 11 % (ref 4–15)
NEUTROPHILS # BLD AUTO: 4 K/UL (ref 1.8–7.7)
NEUTROPHILS NFR BLD: 52.3 % (ref 38–73)
NRBC BLD-RTO: 0 /100 WBC
PLATELET # BLD AUTO: 256 K/UL (ref 150–450)
PMV BLD AUTO: 9 FL (ref 9.2–12.9)
POTASSIUM SERPL-SCNC: 4.2 MMOL/L (ref 3.5–5.1)
PROT SERPL-MCNC: 7.6 G/DL (ref 6–8.4)
RBC # BLD AUTO: 5.11 M/UL (ref 4.6–6.2)
SODIUM SERPL-SCNC: 140 MMOL/L (ref 136–145)
TSH SERPL DL<=0.005 MIU/L-ACNC: 1.48 UIU/ML (ref 0.4–4)
WBC # BLD AUTO: 7.56 K/UL (ref 3.9–12.7)

## 2023-06-19 PROCEDURE — 99999 PR PBB SHADOW E&M-EST. PATIENT-LVL IV: CPT | Mod: PBBFAC,,, | Performed by: NURSE PRACTITIONER

## 2023-06-19 PROCEDURE — 84403 ASSAY OF TOTAL TESTOSTERONE: CPT | Performed by: NURSE PRACTITIONER

## 2023-06-19 PROCEDURE — 1159F MED LIST DOCD IN RCRD: CPT | Mod: CPTII,S$GLB,, | Performed by: NURSE PRACTITIONER

## 2023-06-19 PROCEDURE — 84153 ASSAY OF PSA TOTAL: CPT | Performed by: NURSE PRACTITIONER

## 2023-06-19 PROCEDURE — 3080F DIAST BP >= 90 MM HG: CPT | Mod: CPTII,S$GLB,, | Performed by: NURSE PRACTITIONER

## 2023-06-19 PROCEDURE — 99214 OFFICE O/P EST MOD 30 MIN: CPT | Mod: S$GLB,,, | Performed by: NURSE PRACTITIONER

## 2023-06-19 PROCEDURE — 83036 HEMOGLOBIN GLYCOSYLATED A1C: CPT | Performed by: NURSE PRACTITIONER

## 2023-06-19 PROCEDURE — 3008F BODY MASS INDEX DOCD: CPT | Mod: CPTII,S$GLB,, | Performed by: NURSE PRACTITIONER

## 2023-06-19 PROCEDURE — 3074F SYST BP LT 130 MM HG: CPT | Mod: CPTII,S$GLB,, | Performed by: NURSE PRACTITIONER

## 2023-06-19 PROCEDURE — 3074F PR MOST RECENT SYSTOLIC BLOOD PRESSURE < 130 MM HG: ICD-10-PCS | Mod: CPTII,S$GLB,, | Performed by: NURSE PRACTITIONER

## 2023-06-19 PROCEDURE — 84443 ASSAY THYROID STIM HORMONE: CPT | Mod: PO | Performed by: NURSE PRACTITIONER

## 2023-06-19 PROCEDURE — 1160F RVW MEDS BY RX/DR IN RCRD: CPT | Mod: CPTII,S$GLB,, | Performed by: NURSE PRACTITIONER

## 2023-06-19 PROCEDURE — 85025 COMPLETE CBC W/AUTO DIFF WBC: CPT | Mod: PO | Performed by: NURSE PRACTITIONER

## 2023-06-19 PROCEDURE — 3080F PR MOST RECENT DIASTOLIC BLOOD PRESSURE >= 90 MM HG: ICD-10-PCS | Mod: CPTII,S$GLB,, | Performed by: NURSE PRACTITIONER

## 2023-06-19 PROCEDURE — 1160F PR REVIEW ALL MEDS BY PRESCRIBER/CLIN PHARMACIST DOCUMENTED: ICD-10-PCS | Mod: CPTII,S$GLB,, | Performed by: NURSE PRACTITIONER

## 2023-06-19 PROCEDURE — 3008F PR BODY MASS INDEX (BMI) DOCUMENTED: ICD-10-PCS | Mod: CPTII,S$GLB,, | Performed by: NURSE PRACTITIONER

## 2023-06-19 PROCEDURE — 1159F PR MEDICATION LIST DOCUMENTED IN MEDICAL RECORD: ICD-10-PCS | Mod: CPTII,S$GLB,, | Performed by: NURSE PRACTITIONER

## 2023-06-19 PROCEDURE — 80061 LIPID PANEL: CPT | Performed by: NURSE PRACTITIONER

## 2023-06-19 PROCEDURE — 99999 PR PBB SHADOW E&M-EST. PATIENT-LVL IV: ICD-10-PCS | Mod: PBBFAC,,, | Performed by: NURSE PRACTITIONER

## 2023-06-19 PROCEDURE — 36415 COLL VENOUS BLD VENIPUNCTURE: CPT | Mod: PO | Performed by: NURSE PRACTITIONER

## 2023-06-19 PROCEDURE — 99214 PR OFFICE/OUTPT VISIT, EST, LEVL IV, 30-39 MIN: ICD-10-PCS | Mod: S$GLB,,, | Performed by: NURSE PRACTITIONER

## 2023-06-19 PROCEDURE — 80053 COMPREHEN METABOLIC PANEL: CPT | Mod: PO | Performed by: NURSE PRACTITIONER

## 2023-06-19 NOTE — ASSESSMENT & PLAN NOTE
BP elevated today in clinic. Did not take medication this morning. Continue amlodipine and chlorthalidone. RTC in 2 weeks for nurse visit or notify clinic via [facility] of home BP reading.

## 2023-06-19 NOTE — PROGRESS NOTES
Subjective:       Patient ID: Alessandro Olivares Jr. is a 53 y.o. male.    Chief Complaint: Follow-up and Headache    Mr. Olivares returns to clinic for HTN and headache follow up. He was seen on 5/8/23 and started on propranolol and medrol dose pack for migraines. He was seen again on 6/5/23 and started on Augmentin for sinusitis. Today reports headache improved after completing abx. He continues with mild headaches but reports headaches are less severe and frequent. He is no longer taking propranolol. Hx of chronic sinus issues requesting ENT and/or allergy referral. Also c/o fatigue and decrease libido. He has a history of low testosterone. Previously had pellets, but stopped due to cost. Started OTC supplement (unsure of name), which has provided some relief. BP elevated today in clinic. Did not take medication prior to appointment due to fasting labs.    Patient Active Problem List   Diagnosis    Chronic allergic rhinitis    Nasal polyps    Migraine without status migrainosus, not intractable    Essential hypertension    Lumbar radiculopathy       Family History   Problem Relation Age of Onset    Diabetes Mother     Heart attack Mother     Cataracts Mother     Hypertension Father      Past Surgical History:   Procedure Laterality Date    COLONOSCOPY  11/20/2020    hem, otherwise nl exam, repeat 10 yrs; Tameka Burris MD    COLONOSCOPY N/A 11/20/2020    Procedure: COLONOSCOPY;  Surgeon: Tameka Burris MD;  Location: [facility];  Service: Endoscopy;  Laterality: N/A;    ESOPHAGOGASTRODUODENOSCOPY N/A 11/20/2020    Procedure: EGD (ESOPHAGOGASTRODUODENOSCOPY);  Surgeon: Tameka Burris MD;  Location: [facility];  Service: Endoscopy;  Laterality: N/A;    NASAL DILATION      SELECTIVE INJECTION OF ANESTHETIC AGENT AROUND LUMBAR SPINAL NERVE ROOT BY TRANSFORAMINAL APPROACH Right 2/25/2021    Procedure: BLOCK, SPINAL NERVE ROOT, LUMBAR, SELECTIVE, TRANSFORAMINAL APPROACH Right L4-5 L5-S1 RN IV sedation;  Surgeon: Parker  "MD Dewey;  Location: [facility];  Service: Pain Management;  Laterality: Right;    SELECTIVE INJECTION OF ANESTHETIC AGENT AROUND LUMBAR SPINAL NERVE ROOT BY TRANSFORAMINAL APPROACH Right 3/31/2021    Procedure: BLOCK, SPINAL NERVE ROOT, LUMBAR, SELECTIVE, TRANSFORAMINAL APPROACH RIght L4-5, L5-S1 RN IV sedation;  Surgeon: Parker Palomo MD;  Location: [facility];  Service: Pain Management;  Laterality: Right;    TONSILLECTOMY           Current Outpatient Medications:     amLODIPine (NORVASC) 10 MG tablet, TAKE 1 TABLET BY MOUTH ONCE DAILY; NEEDS APPOINTMENT FOR MORE REFILLS, Disp: 30 tablet, Rfl: 0    chlorthalidone (HYGROTEN) 25 MG Tab, Take 1 tablet (25 mg total) by mouth once daily., Disp: 90 tablet, Rfl: 0    fluticasone propionate (FLONASE) 50 mcg/actuation nasal spray, 1 spray (50 mcg total) by Each Nostril route once daily., Disp: 16 g, Rfl: 0    Review of Systems   Constitutional:  Positive for fatigue. Negative for chills and fever.   HENT:  Positive for congestion. Negative for ear pain, rhinorrhea, sinus pressure, sore throat and tinnitus.    Eyes:  Negative for photophobia, pain and redness.   Respiratory:  Negative for cough and shortness of breath.    Cardiovascular:  Negative for chest pain, palpitations and leg swelling.   Gastrointestinal:  Negative for abdominal pain, nausea and vomiting.   Genitourinary:  Negative for difficulty urinating.   Musculoskeletal:  Negative for myalgias and neck pain.   Skin:  Negative for rash.   Neurological:  Positive for headaches. Negative for dizziness, tremors, syncope, facial asymmetry, speech difficulty, weakness, light-headedness and numbness.   Psychiatric/Behavioral:  Negative for dysphoric mood.      Objective:   BP (!) 124/92 (BP Location: Left arm, Patient Position: Sitting, BP Method: Medium (Manual))   Pulse 74   Temp 98.8 °F (37.1 °C)   Ht 6' 1" (1.854 m)   Wt 89.9 kg (198 lb 3.1 oz)   SpO2 96%   BMI 26.15 kg/m²      Physical " Exam  Constitutional:       General: He is not in acute distress.     Appearance: Normal appearance. He is not ill-appearing.   HENT:      Head: Normocephalic.   Eyes:      Conjunctiva/sclera: Conjunctivae normal.   Cardiovascular:      Rate and Rhythm: Normal rate.   Pulmonary:      Effort: Pulmonary effort is normal. No respiratory distress.   Neurological:      Mental Status: He is alert and oriented to person, place, and time.      Gait: Gait normal.   Psychiatric:         Mood and Affect: Mood normal.         Behavior: Behavior normal.       Assessment & Plan     1. Essential hypertension  Assessment & Plan:  BP elevated today in clinic. Did not take medication this morning. Continue amlodipine and chlorthalidone. RTC in 2 weeks for nurse visit or notify clinic via [employer] of home BP reading.    Orders:  -     COMPREHENSIVE METABOLIC PANEL; Future; Expected date: 06/19/2023    2. Chronic migraine without aura without status migrainosus, not intractable  Assessment & Plan:  Self discontinued propranolol. Improvement in headache pattern after completing abx. Advised to keep headache log. Consider starting another preventative for migraines such as amitriptyline or Topamax if escalation in headache pattern.      3. Chronic allergic rhinitis  Assessment & Plan:  Continue Flonase and Singulair. Referral to ENT      4. Fatigue, unspecified type  Comments:  CBC, TSH, and testosterone today  Orders:  -     TESTOSTERONE; Future; Expected date: 06/19/2023  -     TSH; Future; Expected date: 06/19/2023    5. History of sinus problem  Comments:  referral to ENT  Orders:  -     Ambulatory referral/consult to ENT; Future; Expected date: 06/26/2023    6. Nasal polyps  -     Ambulatory referral/consult to ENT; Future; Expected date: 06/26/2023    7. Routine adult health maintenance  Comments:  Routine labs today.  Orders:  -     COMPREHENSIVE METABOLIC PANEL; Future; Expected date: 06/19/2023  -     LIPID PANEL; Future;  "Expected date: 06/19/2023  -     CBC W/ AUTO DIFFERENTIAL; Future; Expected date: 06/19/2023  -     PSA, SCREENING; Future; Expected date: 06/19/2023  -     HEMOGLOBIN A1C; Future; Expected date: 06/19/2023    8. Diabetes mellitus screening  -     HEMOGLOBIN A1C; Future; Expected date: 06/19/2023    9. Screening for cholesterol level  -     LIPID PANEL; Future; Expected date: 06/19/2023    10. Prostate cancer screening  -     PSA, SCREENING; Future; Expected date: 06/19/2023        PO Newton      Portions of this note may have been created with voice recognition software. Occasional "wrong-word" or "sound-a-like" substitutions may have occurred due to the inherent limitations of voice recognition software. Please, read the note carefully and recognize, using context, where substitutions have occurred.     "

## 2023-06-19 NOTE — ASSESSMENT & PLAN NOTE
Self discontinued propranolol. Improvement in headache pattern after completing abx. Advised to keep headache log. Consider starting another preventative for migraines such as amitriptyline or Topamax if escalation in headache pattern.

## 2023-06-20 LAB
CHOLEST SERPL-MCNC: 261 MG/DL (ref 120–199)
CHOLEST/HDLC SERPL: 4.3 {RATIO} (ref 2–5)
COMPLEXED PSA SERPL-MCNC: 0.37 NG/ML (ref 0–4)
HDLC SERPL-MCNC: 61 MG/DL (ref 40–75)
HDLC SERPL: 23.4 % (ref 20–50)
LDLC SERPL CALC-MCNC: 175.6 MG/DL (ref 63–159)
NONHDLC SERPL-MCNC: 200 MG/DL
TESTOST SERPL-MCNC: 557 NG/DL (ref 304–1227)
TRIGL SERPL-MCNC: 122 MG/DL (ref 30–150)

## 2023-06-26 ENCOUNTER — OFFICE VISIT (OUTPATIENT)
Dept: OTOLARYNGOLOGY | Facility: CLINIC | Age: 53
End: 2023-06-26
Payer: COMMERCIAL

## 2023-06-26 DIAGNOSIS — J01.41 ACUTE RECURRENT PANSINUSITIS: Primary | ICD-10-CM

## 2023-06-26 DIAGNOSIS — Z87.09 HISTORY OF SINUS PROBLEM: ICD-10-CM

## 2023-06-26 DIAGNOSIS — J33.9 NASAL POLYPS: ICD-10-CM

## 2023-06-26 PROCEDURE — 1159F PR MEDICATION LIST DOCUMENTED IN MEDICAL RECORD: ICD-10-PCS | Mod: CPTII,S$GLB,, | Performed by: OTOLARYNGOLOGY

## 2023-06-26 PROCEDURE — 99999 PR PBB SHADOW E&M-EST. PATIENT-LVL II: ICD-10-PCS | Mod: PBBFAC,,, | Performed by: OTOLARYNGOLOGY

## 2023-06-26 PROCEDURE — 99999 PR PBB SHADOW E&M-EST. PATIENT-LVL II: CPT | Mod: PBBFAC,,, | Performed by: OTOLARYNGOLOGY

## 2023-06-26 PROCEDURE — 99214 OFFICE O/P EST MOD 30 MIN: CPT | Mod: S$GLB,,, | Performed by: OTOLARYNGOLOGY

## 2023-06-26 PROCEDURE — 99214 PR OFFICE/OUTPT VISIT, EST, LEVL IV, 30-39 MIN: ICD-10-PCS | Mod: S$GLB,,, | Performed by: OTOLARYNGOLOGY

## 2023-06-26 PROCEDURE — 1159F MED LIST DOCD IN RCRD: CPT | Mod: CPTII,S$GLB,, | Performed by: OTOLARYNGOLOGY

## 2023-06-26 PROCEDURE — 3044F HG A1C LEVEL LT 7.0%: CPT | Mod: CPTII,S$GLB,, | Performed by: OTOLARYNGOLOGY

## 2023-06-26 PROCEDURE — 3044F PR MOST RECENT HEMOGLOBIN A1C LEVEL <7.0%: ICD-10-PCS | Mod: CPTII,S$GLB,, | Performed by: OTOLARYNGOLOGY

## 2023-06-26 NOTE — PROGRESS NOTES
Referring Provider:    Tab Bowen  [address] [place] [id number]  Subjective:   Patient: Alessandro Olivares Jr. [id number], :1970   Visit date:2023 2:49 PM    Chief Complaint:  Headache (3-4 headache days a week, treated for sinus infection with some relief)    HPI:    Prior notes reviewed by myself.  Clinical documentation obtained by nursing staff reviewed.     53-year-old gentleman presents for evaluation of recurrent headaches.  States the headaches alternate and location and last anywhere from hours to a half a day.  In general, the headaches do respond to OTC medications.  Does not have any significant light or sound sensitivity with the headaches.  He denies any conjunctival injection or rhinorrhea with the headaches.  He did have 2 rounds of antibiotics for a presumed sinus infection which he feels like improved his headache symptoms.  He has a history of seasonal environmental allergy symptoms and recurrent acute sinusitis - at least 3-4 infections per year.        Objective:     Physical Exam:  Vitals:  There were no vitals taken for this visit.  General appearance:  Well developed, well nourished    Ears:  Otoscopy of external auditory canals and tympanic membranes was normal, clinical speech reception thresholds grossly intact, no mass/lesion of auricle.    Nose:  No masses/lesions of external nose, congested nasal mucosa, septum, and turbinates were within normal limits.    Mouth:  No mass/lesion of lips, teeth, gums, hard/soft palate, tongue, tonsils, or oropharynx.    Neck & Lymphatics:  No cervical lymphadenopathy, no neck mass/crepitus/ asymmetry, trachea is midline, no thyroid enlargement/tenderness/mass.        [x]  Data Reviewed:    Lab Results   Component Value Date    WBC 7.56 2023    HGB 15.0 2023    HCT 44.6 2023    MCV 87 2023    EOSINOPHIL 3.0 2023       Reviewed MRI from 2021 - no evidence of PNS disease      Assessment &  Plan:   Acute recurrent pansinusitis  -     CT [facility] Sinuses without; Future; Expected date: 06/26/2023    History of sinus problem  Comments:  referral to ENT  Orders:  -     Ambulatory referral/consult to ENT    Nasal polyps  -     Ambulatory referral/consult to ENT        History of recent worsening headaches and at least 3-4 episodes of acute sinusitis per year agreed to move forward with dedicated sinus imaging and he will follow up for results.

## 2023-06-30 ENCOUNTER — PATIENT MESSAGE (OUTPATIENT)
Dept: OTOLARYNGOLOGY | Facility: CLINIC | Age: 53
End: 2023-06-30
Payer: COMMERCIAL

## 2023-07-05 ENCOUNTER — PATIENT OUTREACH (OUTPATIENT)
Dept: ADMINISTRATIVE | Facility: HOSPITAL | Age: 53
End: 2023-07-05
Payer: COMMERCIAL

## 2023-10-02 ENCOUNTER — OFFICE VISIT (OUTPATIENT)
Dept: ALLERGY | Facility: CLINIC | Age: 53
End: 2023-10-02
Payer: COMMERCIAL

## 2023-10-02 VITALS
WEIGHT: 198 LBS | HEIGHT: 73 IN | TEMPERATURE: 98 F | HEART RATE: 78 BPM | SYSTOLIC BLOOD PRESSURE: 143 MMHG | BODY MASS INDEX: 26.24 KG/M2 | DIASTOLIC BLOOD PRESSURE: 88 MMHG

## 2023-10-02 DIAGNOSIS — J31.0 NON-ALLERGIC RHINITIS: Primary | ICD-10-CM

## 2023-10-02 DIAGNOSIS — R51.9 SINUS HEADACHE: ICD-10-CM

## 2023-10-02 PROCEDURE — 3079F PR MOST RECENT DIASTOLIC BLOOD PRESSURE 80-89 MM HG: ICD-10-PCS | Mod: CPTII,S$GLB,, | Performed by: ALLERGY & IMMUNOLOGY

## 2023-10-02 PROCEDURE — 3008F BODY MASS INDEX DOCD: CPT | Mod: CPTII,S$GLB,, | Performed by: ALLERGY & IMMUNOLOGY

## 2023-10-02 PROCEDURE — 99999 PR PBB SHADOW E&M-EST. PATIENT-LVL III: CPT | Mod: PBBFAC,,, | Performed by: ALLERGY & IMMUNOLOGY

## 2023-10-02 PROCEDURE — 99999 PR PBB SHADOW E&M-EST. PATIENT-LVL III: ICD-10-PCS | Mod: PBBFAC,,, | Performed by: ALLERGY & IMMUNOLOGY

## 2023-10-02 PROCEDURE — 3008F PR BODY MASS INDEX (BMI) DOCUMENTED: ICD-10-PCS | Mod: CPTII,S$GLB,, | Performed by: ALLERGY & IMMUNOLOGY

## 2023-10-02 PROCEDURE — 99204 PR OFFICE/OUTPT VISIT, NEW, LEVL IV, 45-59 MIN: ICD-10-PCS | Mod: 25,S$GLB,, | Performed by: ALLERGY & IMMUNOLOGY

## 2023-10-02 PROCEDURE — 3077F SYST BP >= 140 MM HG: CPT | Mod: CPTII,S$GLB,, | Performed by: ALLERGY & IMMUNOLOGY

## 2023-10-02 PROCEDURE — 3077F PR MOST RECENT SYSTOLIC BLOOD PRESSURE >= 140 MM HG: ICD-10-PCS | Mod: CPTII,S$GLB,, | Performed by: ALLERGY & IMMUNOLOGY

## 2023-10-02 PROCEDURE — 99204 OFFICE O/P NEW MOD 45 MIN: CPT | Mod: 25,S$GLB,, | Performed by: ALLERGY & IMMUNOLOGY

## 2023-10-02 PROCEDURE — 95004 PERQ TESTS W/ALRGNC XTRCS: CPT | Mod: S$GLB,,, | Performed by: ALLERGY & IMMUNOLOGY

## 2023-10-02 PROCEDURE — 3079F DIAST BP 80-89 MM HG: CPT | Mod: CPTII,S$GLB,, | Performed by: ALLERGY & IMMUNOLOGY

## 2023-10-02 PROCEDURE — 1159F PR MEDICATION LIST DOCUMENTED IN MEDICAL RECORD: ICD-10-PCS | Mod: CPTII,S$GLB,, | Performed by: ALLERGY & IMMUNOLOGY

## 2023-10-02 PROCEDURE — 3044F PR MOST RECENT HEMOGLOBIN A1C LEVEL <7.0%: ICD-10-PCS | Mod: CPTII,S$GLB,, | Performed by: ALLERGY & IMMUNOLOGY

## 2023-10-02 PROCEDURE — 3044F HG A1C LEVEL LT 7.0%: CPT | Mod: CPTII,S$GLB,, | Performed by: ALLERGY & IMMUNOLOGY

## 2023-10-02 PROCEDURE — 95004 PR ALLERGY SKIN TESTS,ALLERGENS: ICD-10-PCS | Mod: S$GLB,,, | Performed by: ALLERGY & IMMUNOLOGY

## 2023-10-02 PROCEDURE — 1159F MED LIST DOCD IN RCRD: CPT | Mod: CPTII,S$GLB,, | Performed by: ALLERGY & IMMUNOLOGY

## 2023-10-02 RX ORDER — IPRATROPIUM BROMIDE 21 UG/1
2 SPRAY, METERED NASAL 3 TIMES DAILY
Qty: 12 ML | Refills: 5 | Status: SHIPPED | OUTPATIENT
Start: 2023-10-02

## 2023-10-02 NOTE — PROGRESS NOTES
"Subjective:      Patient ID: Alessandro Olivares Jr. is a 53 y.o. male.    Self Referral    Chief Complaint:  Sinus Problem      HPI:  53 year old male complaining of headaches daily for a few months. He reports that the headaches "start in one area and move".  He reports that he wakes up at night with a headache. He takes migraine headache medicine. He reports doing sinus washes alleviates headaches intermittently.   He is taking Flonase as needed.  Runny nose, nasal congestion, itchy and watery eyes- year round    He denies asthma, food, and insect sting allergy.    He has not had allergy testing.    Last 12 months- 1 sinus infections            Past Medical History:   Diagnosis Date    Allergy     Bleeding gastric ulcer     GERD (gastroesophageal reflux disease)     Hypertension     Migraine headache         Family History   Problem Relation Age of Onset    Diabetes Mother     Heart attack Mother     Cataracts Mother     Hypertension Father         Current Outpatient Medications on File Prior to Visit   Medication Sig Dispense Refill    amLODIPine (NORVASC) 10 MG tablet TAKE 1 TABLET BY MOUTH ONCE DAILY; NEEDS APPOINTMENT FOR MORE REFILLS 30 tablet 0    fluticasone propionate (FLONASE) 50 mcg/actuation nasal spray 1 spray (50 mcg total) by Each Nostril route once daily. 16 g 0    chlorthalidone (HYGROTEN) 25 MG Tab Take 1 tablet (25 mg total) by mouth once daily. (Patient not taking: Reported on 6/26/2023) 90 tablet 0     No current facility-administered medications on file prior to visit.        Review of patient's allergies indicates:   Allergen Reactions    Pollen extracts         Environmental History: Pets in the home: dogs (1).  Tobacco Smoke in Home: no  Review of Systems   Constitutional:  Negative for chills and fever.   HENT:  Positive for congestion and rhinorrhea.    Eyes:  Positive for discharge and itching.   Respiratory:  Negative for cough, shortness of breath and wheezing.    Cardiovascular:  " Negative for chest pain and leg swelling.   Gastrointestinal:  Negative for nausea and vomiting.   Skin:  Negative for rash and wound.   Allergic/Immunologic: Positive for environmental allergies. Negative for food allergies and immunocompromised state.   Neurological:  Positive for headaches. Negative for facial asymmetry and speech difficulty.   Hematological:  Negative for adenopathy. Does not bruise/bleed easily.   Psychiatric/Behavioral:  Negative for behavioral problems and suicidal ideas.        Objective:   Physical Exam  Constitutional:       General: He is not in acute distress.     Appearance: Normal appearance. He is not ill-appearing, toxic-appearing or diaphoretic.   HENT:      Head: Normocephalic and atraumatic.      Right Ear: Tympanic membrane, ear canal and external ear normal. There is no impacted cerumen.      Left Ear: Tympanic membrane, ear canal and external ear normal. There is no impacted cerumen.      Nose: Nose normal. No congestion or rhinorrhea.      Mouth/Throat:      Mouth: Mucous membranes are moist.      Pharynx: No oropharyngeal exudate or posterior oropharyngeal erythema.   Eyes:      General: No scleral icterus.        Right eye: No discharge.         Left eye: No discharge.      Pupils: Pupils are equal, round, and reactive to light.   Neck:      Thyroid: No thyromegaly.   Cardiovascular:      Rate and Rhythm: Normal rate and regular rhythm.      Heart sounds: Normal heart sounds. No murmur heard.     No friction rub. No gallop.   Pulmonary:      Effort: Pulmonary effort is normal. No respiratory distress.      Breath sounds: Normal breath sounds. No stridor. No wheezing or rales.   Musculoskeletal:         General: Normal range of motion.      Cervical back: Normal range of motion and neck supple. No rigidity or tenderness.   Lymphadenopathy:      Cervical: No cervical adenopathy.   Skin:     General: Skin is warm and dry.      Findings: No rash.   Neurological:      General: No  focal deficit present.      Mental Status: He is alert and oriented to person, place, and time.      Gait: Gait normal.   Psychiatric:         Mood and Affect: Mood normal.         Behavior: Behavior normal.         Thought Content: Thought content normal.         Judgment: Judgment normal.     Allergy Skin Prick testing  Histamine 8x6, 10 x10  Saline 2x2  He had an appropriate response to positive and negative controls.  The following were negative:  Red Maple, Juniper, Pine mix,Cat, Dog, Dust mite(F and P), Cockroach, Alternaria, Aspergillus, Helminthosporium, [place]  [place], Hackberry, Ligustrum, Oak, Pecan, Red Cedar, [place], [place], [place], Florencio, Red Top/Agrostis [place]ba, Rye/Lolium, Master, English Plantain, Marsh Elder, Pigweed, Ragweed, Russian Thistle, Curvularia/Drechsiera Spicifera, Epicoccum, Fusarium, Hormodendrum/Cladosporium, Penicillium, Monilia/candida, Phoma, Stemphylium      Allergy Skin prick testing was negative to common inhalants today.  I interpreted the test.    Assessment:      1. Non-allergic rhinitis    2. Sinus headache        Plan:     Non-allergic rhinitis  -     ipratropium (ATROVENT) 21 mcg (0.03 %) nasal spray; 2 sprays by Each Nostril route 3 (three) times daily.  Dispense: 12 mL; Refill: 5    Sinus headache       Recommend he see Neurology. He reports having a visit previously scheduled.    Skin prick testing to common inhalants was negative.    Recommend nasal saline rinses daily.    RTC prn     MD,[id number]                  Problems Address                                                 Amount and/or Complexity                                                                      Risk       3           [] 2 or more self-limited or minor problems                      [] Limited                                                                        [] Low                  [] 1 stable chronic illness                                                  Any combination of the  two                                               OTC drugs                  []Acute, uncomplicated illness or injury                            Review of prior external notes from unique source           Minor surgery with no risk factors                                                                                                               [] 1 []2  []3+                                                                                                              Review of results from each unique test                                                                                                               [] 1 []2  [] 3+                                                                                                              Order of each unique test                                                                                                               [] 1 []2  [] 3+                                                                                                              Or                                                                                                             [] Assessment requiring an independent historian      4            [] One or more chronic illness with exacerbation,              [] Moderate                                                                      [x] Moderate                 Progression, or side effects of treatment                            -test documents or independent historians                        Prescription drug management                [x]  2 or more stable chronic illnesses                                    [x] Independent interpretation of tests                              Minor surgery with identifiable risk                [] 1 undiagnosed new problem with uncertain prognosis    [x] Discussion or management of test results                    elective major surgery                [] 1 acute illness with                systemic  symptoms                                                                                                                                                              [] 1 acute complicated injury                                                                                                                                          Elective major surgery                                                                                                                                                                                                                                                                                                                                                                                                  5            [] 1 or more chronic illnesses with severe exacerbation,     [] Extensive(two from below)                                         [] High                                                                                                               [] Independent interpretation of results                         Drug therapy requiring intensive                                                                                                               []Discussion of management or test interpretation           monitoring                                                                                                                                                                                                       Decision to de-escalate care                 [] 1 acute or chronic illness or injury that poses a threat                                                                                               Decision regarding hospitalization

## 2023-11-27 ENCOUNTER — OFFICE VISIT (OUTPATIENT)
Dept: PODIATRY | Facility: CLINIC | Age: 53
End: 2023-11-27
Payer: COMMERCIAL

## 2023-11-27 ENCOUNTER — OFFICE VISIT (OUTPATIENT)
Dept: INTERNAL MEDICINE | Facility: CLINIC | Age: 53
End: 2023-11-27
Payer: COMMERCIAL

## 2023-11-27 ENCOUNTER — HOSPITAL ENCOUNTER (OUTPATIENT)
Dept: RADIOLOGY | Facility: HOSPITAL | Age: 53
Discharge: HOME | End: 2023-11-27
Attending: FAMILY MEDICINE
Payer: COMMERCIAL

## 2023-11-27 VITALS — HEIGHT: 73 IN | WEIGHT: 203 LBS | BODY MASS INDEX: 26.9 KG/M2

## 2023-11-27 VITALS
SYSTOLIC BLOOD PRESSURE: 160 MMHG | DIASTOLIC BLOOD PRESSURE: 100 MMHG | HEIGHT: 73 IN | TEMPERATURE: 98 F | OXYGEN SATURATION: 100 % | BODY MASS INDEX: 26.99 KG/M2 | HEART RATE: 83 BPM | WEIGHT: 203.69 LBS

## 2023-11-27 DIAGNOSIS — M76.822 POSTERIOR TIBIAL TENDON DYSFUNCTION (PTTD) OF LEFT LOWER EXTREMITY: Primary | ICD-10-CM

## 2023-11-27 DIAGNOSIS — M72.2 PLANTAR FASCIITIS: ICD-10-CM

## 2023-11-27 DIAGNOSIS — R03.0 ELEVATED BLOOD PRESSURE READING: ICD-10-CM

## 2023-11-27 DIAGNOSIS — M72.2 PLANTAR FASCIITIS: Primary | ICD-10-CM

## 2023-11-27 DIAGNOSIS — I10 ESSENTIAL HYPERTENSION: ICD-10-CM

## 2023-11-27 PROCEDURE — 3008F PR BODY MASS INDEX (BMI) DOCUMENTED: ICD-10-PCS | Mod: CPTII,S$GLB,, | Performed by: FAMILY MEDICINE

## 2023-11-27 PROCEDURE — 99214 OFFICE O/P EST MOD 30 MIN: CPT | Mod: S$GLB,,, | Performed by: FAMILY MEDICINE

## 2023-11-27 PROCEDURE — 3008F BODY MASS INDEX DOCD: CPT | Mod: CPTII,S$GLB,, | Performed by: FAMILY MEDICINE

## 2023-11-27 PROCEDURE — 3077F SYST BP >= 140 MM HG: CPT | Mod: CPTII,S$GLB,, | Performed by: FAMILY MEDICINE

## 2023-11-27 PROCEDURE — 73630 X-RAY EXAM OF FOOT: CPT | Mod: 26,LT,, | Performed by: RADIOLOGY

## 2023-11-27 PROCEDURE — 1160F RVW MEDS BY RX/DR IN RCRD: CPT | Mod: CPTII,S$GLB,, | Performed by: PODIATRIST

## 2023-11-27 PROCEDURE — 3044F HG A1C LEVEL LT 7.0%: CPT | Mod: CPTII,S$GLB,, | Performed by: PODIATRIST

## 2023-11-27 PROCEDURE — 1159F PR MEDICATION LIST DOCUMENTED IN MEDICAL RECORD: ICD-10-PCS | Mod: CPTII,S$GLB,, | Performed by: FAMILY MEDICINE

## 2023-11-27 PROCEDURE — 3080F DIAST BP >= 90 MM HG: CPT | Mod: CPTII,S$GLB,, | Performed by: FAMILY MEDICINE

## 2023-11-27 PROCEDURE — 3044F PR MOST RECENT HEMOGLOBIN A1C LEVEL <7.0%: ICD-10-PCS | Mod: CPTII,S$GLB,, | Performed by: PODIATRIST

## 2023-11-27 PROCEDURE — 99999 PR PBB SHADOW E&M-EST. PATIENT-LVL IV: ICD-10-PCS | Mod: PBBFAC,,, | Performed by: FAMILY MEDICINE

## 2023-11-27 PROCEDURE — 1159F MED LIST DOCD IN RCRD: CPT | Mod: CPTII,S$GLB,, | Performed by: PODIATRIST

## 2023-11-27 PROCEDURE — 73630 XR FOOT COMPLETE 3 VIEW LEFT: ICD-10-PCS | Mod: 26,LT,, | Performed by: RADIOLOGY

## 2023-11-27 PROCEDURE — 99999 PR PBB SHADOW E&M-EST. PATIENT-LVL III: CPT | Mod: PBBFAC,,, | Performed by: PODIATRIST

## 2023-11-27 PROCEDURE — 3077F PR MOST RECENT SYSTOLIC BLOOD PRESSURE >= 140 MM HG: ICD-10-PCS | Mod: CPTII,S$GLB,, | Performed by: FAMILY MEDICINE

## 2023-11-27 PROCEDURE — 3008F PR BODY MASS INDEX (BMI) DOCUMENTED: ICD-10-PCS | Mod: CPTII,S$GLB,, | Performed by: PODIATRIST

## 2023-11-27 PROCEDURE — 3008F BODY MASS INDEX DOCD: CPT | Mod: CPTII,S$GLB,, | Performed by: PODIATRIST

## 2023-11-27 PROCEDURE — 99204 PR OFFICE/OUTPT VISIT, NEW, LEVL IV, 45-59 MIN: ICD-10-PCS | Mod: S$GLB,,, | Performed by: PODIATRIST

## 2023-11-27 PROCEDURE — 3080F PR MOST RECENT DIASTOLIC BLOOD PRESSURE >= 90 MM HG: ICD-10-PCS | Mod: CPTII,S$GLB,, | Performed by: FAMILY MEDICINE

## 2023-11-27 PROCEDURE — 99214 PR OFFICE/OUTPT VISIT, EST, LEVL IV, 30-39 MIN: ICD-10-PCS | Mod: S$GLB,,, | Performed by: FAMILY MEDICINE

## 2023-11-27 PROCEDURE — 1159F MED LIST DOCD IN RCRD: CPT | Mod: CPTII,S$GLB,, | Performed by: FAMILY MEDICINE

## 2023-11-27 PROCEDURE — 99204 OFFICE O/P NEW MOD 45 MIN: CPT | Mod: S$GLB,,, | Performed by: PODIATRIST

## 2023-11-27 PROCEDURE — 99999 PR PBB SHADOW E&M-EST. PATIENT-LVL IV: CPT | Mod: PBBFAC,,, | Performed by: FAMILY MEDICINE

## 2023-11-27 PROCEDURE — 3044F PR MOST RECENT HEMOGLOBIN A1C LEVEL <7.0%: ICD-10-PCS | Mod: CPTII,S$GLB,, | Performed by: FAMILY MEDICINE

## 2023-11-27 PROCEDURE — 1159F PR MEDICATION LIST DOCUMENTED IN MEDICAL RECORD: ICD-10-PCS | Mod: CPTII,S$GLB,, | Performed by: PODIATRIST

## 2023-11-27 PROCEDURE — 1160F PR REVIEW ALL MEDS BY PRESCRIBER/CLIN PHARMACIST DOCUMENTED: ICD-10-PCS | Mod: CPTII,S$GLB,, | Performed by: PODIATRIST

## 2023-11-27 PROCEDURE — 99999 PR PBB SHADOW E&M-EST. PATIENT-LVL III: ICD-10-PCS | Mod: PBBFAC,,, | Performed by: PODIATRIST

## 2023-11-27 PROCEDURE — 3044F HG A1C LEVEL LT 7.0%: CPT | Mod: CPTII,S$GLB,, | Performed by: FAMILY MEDICINE

## 2023-11-27 PROCEDURE — 73630 X-RAY EXAM OF FOOT: CPT | Mod: TC,LT

## 2023-11-27 RX ORDER — IBUPROFEN 800 MG/1
800 TABLET ORAL 2 TIMES DAILY
Qty: 30 TABLET | Refills: 0 | Status: SHIPPED | OUTPATIENT
Start: 2023-11-27

## 2023-11-27 NOTE — PROGRESS NOTES
Subjective:       Patient ID: Alessandro Olivares Jr. is a 53 y.o. male.    Chief Complaint: Other    Several months duration of left foot pain.  He reports that he walks much at work.  He denies single episode of trauma.  He denies fever chills redness of the foot.  He noticed recently some swelling.  He is concerned because his has a history of gout.  Additionally takes amlodipine 10 mg for hypertension.  He denies chest pain palpitations shortness of breath      Review of Systems   Constitutional:  Negative for chills and fever.   Respiratory:  Negative for cough, chest tightness, shortness of breath and wheezing.    Cardiovascular:  Negative for chest pain, palpitations and leg swelling.   Gastrointestinal:  Negative for abdominal distention and abdominal pain.   Musculoskeletal:         Left foot pain   Neurological:  Negative for dizziness, weakness and light-headedness.       Objective:      Physical Exam  Constitutional:       General: He is not in acute distress.     Appearance: He is not ill-appearing or diaphoretic.   Cardiovascular:      Rate and Rhythm: Normal rate and regular rhythm.      Heart sounds: No murmur heard.     No gallop.   Pulmonary:      Effort: Pulmonary effort is normal. No respiratory distress.      Breath sounds: No wheezing, rhonchi or rales.   Abdominal:      General: There is no distension.   Musculoskeletal:      Comments: Mild tenderness midportion of left plantar foot.  No redness swelling.  He does have some flat footedness with pronation of the left foot   Lymphadenopathy:      Cervical: No cervical adenopathy.   Skin:     General: Skin is warm and dry.      Coloration: Skin is not pale.      Findings: No erythema.   Neurological:      Mental Status: He is alert.         Lab Visit on 06/19/2023   Component Date Value Ref Range Status    Sodium 06/19/2023 140  136 - 145 mmol/L Final    Potassium 06/19/2023 4.2  3.5 - 5.1 mmol/L Final    Chloride 06/19/2023 103  95 - 110 mmol/L  Final    CO2 06/19/2023 28  23 - 29 mmol/L Final    Glucose 06/19/2023 106  70 - 110 mg/dL Final    BUN 06/19/2023 18  6 - 20 mg/dL Final    Creatinine 06/19/2023 1.0  0.5 - 1.4 mg/dL Final    Calcium 06/19/2023 10.3  8.7 - 10.5 mg/dL Final    Total Protein 06/19/2023 7.6  6.0 - 8.4 g/dL Final    Albumin 06/19/2023 4.5  3.5 - 5.2 g/dL Final    Total Bilirubin 06/19/2023 0.3  0.1 - 1.0 mg/dL Final    Alkaline Phosphatase 06/19/2023 47 (L)  55 - 135 U/L Final    AST 06/19/2023 17  10 - 40 U/L Final    ALT 06/19/2023 19  10 - 44 U/L Final    Anion Gap 06/19/2023 9  8 - 16 mmol/L Final    eGFR 06/19/2023 >60.0  >60 mL/min/1.73 m^2 Final    Cholesterol 06/19/2023 261 (H)  120 - 199 mg/dL Final    Triglycerides 06/19/2023 122  30 - 150 mg/dL Final    HDL 06/19/2023 61  40 - 75 mg/dL Final    LDL Cholesterol 06/19/2023 175.6 (H)  63.0 - 159.0 mg/dL Final    HDL/Cholesterol Ratio 06/19/2023 23.4  20.0 - 50.0 % Final    Total Cholesterol/HDL Ratio 06/19/2023 4.3  2.0 - 5.0 Final    Non-HDL Cholesterol 06/19/2023 200  mg/dL Final    WBC 06/19/2023 7.56  3.90 - 12.70 K/uL Final    RBC 06/19/2023 5.11  4.60 - 6.20 M/uL Final    Hemoglobin 06/19/2023 15.0  14.0 - 18.0 g/dL Final    Hematocrit 06/19/2023 44.6  40.0 - 54.0 % Final    MCV 06/19/2023 87  82 - 98 fL Final    MCH 06/19/2023 29.4  27.0 - 31.0 pg Final    MCHC 06/19/2023 33.6  32.0 - 36.0 g/dL Final    RDW 06/19/2023 15.0 (H)  11.5 - 14.5 % Final    Platelets 06/19/2023 256  150 - 450 K/uL Final    MPV 06/19/2023 9.0 (L)  9.2 - 12.9 fL Final    Immature Granulocytes 06/19/2023 0.3  0.0 - 0.5 % Final    Gran # (ANC) 06/19/2023 4.0  1.8 - 7.7 K/uL Final    Immature Grans (Abs) 06/19/2023 0.02  0.00 - 0.04 K/uL Final    Lymph # 06/19/2023 2.5  1.0 - 4.8 K/uL Final    Mono # 06/19/2023 0.8  0.3 - 1.0 K/uL Final    Eos # 06/19/2023 0.2  0.0 - 0.5 K/uL Final    Baso # 06/19/2023 0.05  0.00 - 0.20 K/uL Final    nRBC 06/19/2023 0  0 /100 WBC Final    Gran % 06/19/2023 52.3   38.0 - 73.0 % Final    Lymph % 06/19/2023 32.7  18.0 - 48.0 % Final    Mono % 06/19/2023 11.0  4.0 - 15.0 % Final    Eosinophil % 06/19/2023 3.0  0.0 - 8.0 % Final    Basophil % 06/19/2023 0.7  0.0 - 1.9 % Final    Differential Method 06/19/2023 Automated   Final    Testosterone, Total 06/19/2023 557  304 - 1227 ng/dL Final    PSA, Screen 06/19/2023 0.37  0.00 - 4.00 ng/mL Final    Hemoglobin A1C 06/19/2023 5.5  4.0 - 5.6 % Final    Estimated Avg Glucose 06/19/2023 111  68 - 131 mg/dL Final    TSH 06/19/2023 1.478  0.400 - 4.000 uIU/mL Final     Assessment:       1. Plantar fasciitis    2. Essential hypertension    3. Elevated blood pressure reading        Plan:     He has plantar fasciitis with pronation of the foot.  Ibuprofen 800 mg twice a day continue supportive shoes warm soaks twice a day.  Podiatry referral.  X-ray of his foot.  Additionally blood pressure is elevated.  He discontinued Hygroton.  Monitor blood pressure daily at home and follow-up in month previous blood pressures noted.    Plantar fasciitis  -     ibuprofen (ADVIL,MOTRIN) 800 MG tablet; Take 1 tablet (800 mg total) by mouth 2 (two) times a day.  Dispense: 30 tablet; Refill: 0  -     Ambulatory referral/consult to Podiatry; Future; Expected date: 12/04/2023  -     X-Ray Foot Complete 3 view Left; Future; Expected date: 11/27/2023    Essential hypertension    Elevated blood pressure reading

## 2023-11-27 NOTE — PROGRESS NOTES
Subjective:       Patient ID: Alessandro Olivares Jr. is a 53 y.o. male.    Chief Complaint: Plantar Fasciitis (Pt presents with left foot pain up into arch trouble walking and hurts after he sits down. Pain 5/10, Non diabetic , wearing closed toe shoes. Last saw PCP Daniel Blackwood MD at 11/27/2023 this AM.)      HPI: Alessandro Olivares Jr. presents to clinic today at the referral of Dr. Blackwood with complaints of pain to the left foot as well as the medial arch when walking.  States 5/10 pain.  Denies any recent trauma or injury.  Has utilize over-the-counter orthotics in the past have had mild improvement but continues have discomfort. Pains have been present for the past several weeks to months and the pains have worsened over the past couple of weeks. States walking and standing causes and/or exacerbates the symptoms. Patient has had no corticosteroid injection(s) prior. Patient's Primary Care Provider is Daniel Blackwood MD.     Review of patient's allergies indicates:   Allergen Reactions    Pollen extracts        Past Medical History:   Diagnosis Date    Allergy     Bleeding gastric ulcer     GERD (gastroesophageal reflux disease)     Hypertension     Migraine headache        Family History   Problem Relation Age of Onset    Diabetes Mother     Heart attack Mother     Cataracts Mother     Hypertension Father        Social History     Socioeconomic History    Marital status:    Tobacco Use    Smoking status: Never    Smokeless tobacco: Never   Substance and Sexual Activity    Alcohol use: No    Drug use: No     Social Determinants of Health     Financial Resource Strain: Low Risk  (8/29/2022)    Overall Financial Resource Strain (CARDIA)     Difficulty of Paying Living Expenses: Not hard at all   Food Insecurity: No Food Insecurity (8/29/2022)    Hunger Vital Sign     Worried About Running Out of Food in the Last Year: Never true     Ran Out of Food in the Last Year: Never true   Transportation Needs:  Unknown (8/29/2022)    PRAPARE - Transportation     Lack of Transportation (Medical): Patient refused     Lack of Transportation (Non-Medical): Patient refused   Physical Activity: Unknown (8/29/2022)    Exercise Vital Sign     Days of Exercise per Week: 2 days   Stress: No Stress Concern Present (8/29/2022)    [employer] [place] [employer] - Occupational Stress Questionnaire     Feeling of Stress : Not at all   Social Connections: Unknown (8/29/2022)    Social Connection and Isolation Panel [NHANES]     Frequency of Communication with Friends and Family: More than three times a week     Frequency of Social Gatherings with Friends and Family: Once a week     Active Member of Clubs or Organizations: Yes     Attends Club or Organization Meetings: More than 4 times per year     Marital Status:        Past Surgical History:   Procedure Laterality Date    COLONOSCOPY  11/20/2020    hem, otherwise nl exam, repeat 10 yrs; Tameka Burris MD    COLONOSCOPY N/A 11/20/2020    Procedure: COLONOSCOPY;  Surgeon: Tameka Burris MD;  Location: [facility];  Service: Endoscopy;  Laterality: N/A;    ESOPHAGOGASTRODUODENOSCOPY N/A 11/20/2020    Procedure: EGD (ESOPHAGOGASTRODUODENOSCOPY);  Surgeon: Tameka Burris MD;  Location: [facility];  Service: Endoscopy;  Laterality: N/A;    NASAL DILATION      SELECTIVE INJECTION OF ANESTHETIC AGENT AROUND LUMBAR SPINAL NERVE ROOT BY TRANSFORAMINAL APPROACH Right 2/25/2021    Procedure: BLOCK, SPINAL NERVE ROOT, LUMBAR, SELECTIVE, TRANSFORAMINAL APPROACH Right L4-5 L5-S1 RN IV sedation;  Surgeon: Parker Palomo MD;  Location: [facility];  Service: Pain Management;  Laterality: Right;    SELECTIVE INJECTION OF ANESTHETIC AGENT AROUND LUMBAR SPINAL NERVE ROOT BY TRANSFORAMINAL APPROACH Right 3/31/2021    Procedure: BLOCK, SPINAL NERVE ROOT, LUMBAR, SELECTIVE, TRANSFORAMINAL APPROACH RIght L4-5, L5-S1 RN IV sedation;  Surgeon: Parker Palomo MD;  Location: [facility];  Service:  "Pain Management;  Laterality: Right;    TONSILLECTOMY         Review of Systems      Objective:   Ht 6' 1" (1.854 m)   Wt 92.1 kg (203 lb)   BMI 26.78 kg/m²     X-Ray Foot Complete 3 view Left  Narrative: EXAMINATION:  XR FOOT COMPLETE 3 VIEW LEFT    CLINICAL HISTORY:  .  Plantar fascial fibromatosis    TECHNIQUE:  AP, lateral and oblique views of the left foot were performed.    COMPARISON:  None    FINDINGS:  There is no radiographic evidence of acute osseous, articular, or soft tissue abnormality.  Joint spaces are well preserved.  No erosive changes demonstrated.  Impression: No acute findings    Electronically signed by: Nico Yi MD  Date:    11/27/2023  Time:    09:25      Physical Exam  LOWER EXTREMITY PHYSICAL EXAMINATION    VASCULAR: The right DP pulse is 2/4 and the left DP is 2/4. The right PT pulse is 2/4 and the left PT pulse is 2/4. Proximal to distal, warm to warm. No dependent rubor or elevation palor is noted. Capillary refill time is less than 3 seconds. Hair growth is appreciated to the dorsal foot and digits.    NEUROLOGY: Proprioception is intact, bilateral. Sensation to light touch is intact. Negative Tinel's Sign and negative Valleix Sign. No neurological sensations with compression of the area of Stafford's Nerve in the area of the Abductor Hallucis muscle belly.    ORTHOPEDIC:  Moderate tenderness to palpation of the area around the plantar medial calcaneal tubercle on the mild foot. Pains are characterized as sharp and stabbing-like with direct palpation of the area. There is also mild pain to palpation of the immediate plantar aspect of the heel, and no pains to the lateral band of the fascia. No edema is noted. No fullness is noted. No pains or defects are noted within the plantar fascia at the arch. No plantar fibromas are noted. No defects are noted within the ligament. Dorsiflexion of the MTPJs with simultaneous palpation of the fascia at the arch, does worsen and exacerbate " the pains. No pains with medial to lateral compression of the heel. Equinus contracture is noted.  Loss of the medial longitudinal arch with weight-bearing.  Flexible flatfoot deformity is noted.  Calcaneal valgus.  Deformity is supple in nature.  Laxity of the subtalar joint noted.    DERMATOLOGY: No ecchymosis is noted.  Skin is supple, dry and intact. Skin is supple.  No hyperkeratosis noted. No calluses.  No open wounds or ulcerations are noted.  No palpable plantar fibromas noted.    Assessment:     1. Posterior tibial tendon dysfunction (PTTD) of left lower extremity    2. Plantar fasciitis          Plan:     Posterior tibial tendon dysfunction (PTTD) of left lower extremity    Plantar fasciitis  -     Ambulatory referral/consult to Podiatry        Thorough discussion is had with the patient today concerning the diagnosis, its etiology, and the treatment algorithm at present.    We discussed pathology etiology associated with pes planus foot deformity, posterior tibial tendon dysfunction, and plantar fasciitis.  We discussed treating the underlying cause by mechanically as this is associated with posterior tibial tendon dysfunction and controlling the subtalar joint from excessive pronation.    I explained to the patient that etiology and all treatment options for heel pain including rest,  ice messages, stretching exercises, strappings/tappings, NSAID's, injections, new shoegear with orthotic inserts, and/or surgical treatment. I also discussed a possible injection of steroid to help calm down the inflammation sooner. I expressed the importance of wearing the orthotics in culmination of other treatment modalities. Patient agreed to stretching exercises and inserts. I gave written and verbal instructions on heel cord stretching and this was demonstrated for the patient. Patient expressed understanding and had the patient teach back the instructions.  Patient instructed on adequate icing techniques which should  be done for acute pain and inflammation.    A prescription was provided to the patient for orthotics.  We discussed the importance of wearing the orthotics to help elevate the arch which will allow for tension to be lessened to the plantar fascia and posterior heel cord.  Discussed the importance of the break-in period with the inserts by wearing them 2-3 hours for the 1st day and increasing their use an hour each day until able to tolerate a full work period.          Future Appointments   Date Time Provider Department Center   11/27/2023  1:00 PM [facility] XR2-[facility] [place]   12/18/2023  8:20 AM Ricky Melissa MD [facility]  [place]   1/8/2024  8:40 [facility]

## 2023-11-28 RX ORDER — AMLODIPINE BESYLATE 10 MG/1
10 TABLET ORAL DAILY
Qty: 30 TABLET | Refills: 0 | Status: SHIPPED | OUTPATIENT
Start: 2023-11-28 | End: 2023-12-25

## 2023-12-23 NOTE — TELEPHONE ENCOUNTER
No care due was identified.  [facility] Embedded Care Due Messages. Reference number: [id number].   12/22/2023 8:42:11 PM CST

## 2023-12-24 NOTE — TELEPHONE ENCOUNTER
Refill Routing Note   Medication(s) are not appropriate for processing by [facility] for the following reason(s):        Required vitals abnormal BP  11/27/23 (!) 160/100   10/02/23 (!) 143/88   06/19/23 (!) 124/92       OR action(s):  Defer        Medication Therapy Plan: 11/27/23 (!) 160/100 10/02/23 (!) 143/88 06/19/23 (!) 124/92      Appointments  past 12m or future 3m with PCP    Date Provider   Last Visit   11/27/2023 Daniel Blackwood MD   Next Visit   Visit date not found Daniel Blackwood MD   ED visits in past 90 days: 0        Note composed:8:12 AM 12/24/2023

## 2023-12-25 RX ORDER — AMLODIPINE BESYLATE 10 MG/1
10 TABLET ORAL
Qty: 30 TABLET | Refills: 0 | Status: SHIPPED | OUTPATIENT
Start: 2023-12-25 | End: 2024-01-25

## 2024-01-24 NOTE — TELEPHONE ENCOUNTER
No care due was identified.  [facility] Embedded Care Due Messages. Reference number: [id number].   1/24/2024 5:01:07 PM CST

## 2024-01-25 RX ORDER — AMLODIPINE BESYLATE 10 MG/1
10 TABLET ORAL
Qty: 30 TABLET | Refills: 5 | Status: SHIPPED | OUTPATIENT
Start: 2024-01-25

## 2024-01-25 NOTE — TELEPHONE ENCOUNTER
Refill Routing Note   Medication(s) are not appropriate for processing by [facility] for the following reason(s):        Required vitals abnormal    [facility] action(s):  Defer               Appointments  past 12m or future 3m with PCP    Date Provider   Last Visit   11/27/2023 Daniel Blackwood MD   Next Visit   Visit date not found Daniel Blackwood MD   ED visits in past 90 days: 0        Note composed:8:27 PM 01/24/2024

## 2024-06-26 DIAGNOSIS — I10 ESSENTIAL HYPERTENSION: ICD-10-CM

## 2024-07-12 ENCOUNTER — PATIENT MESSAGE (OUTPATIENT)
Dept: INTERNAL MEDICINE | Facility: CLINIC | Age: 54
End: 2024-07-12
Payer: COMMERCIAL

## 2024-07-12 RX ORDER — AMLODIPINE BESYLATE 10 MG/1
10 TABLET ORAL
Qty: 30 TABLET | Refills: 0 | Status: SHIPPED | OUTPATIENT
Start: 2024-07-12

## 2024-07-12 NOTE — TELEPHONE ENCOUNTER
Care Due:                  Date            Visit Type   Department     Provider  --------------------------------------------------------------------------------                                EP -                              PRIMARY      [facility] INTERNAL  Last Visit: 11-      CARE ([facility])   MEDICINE       Daniel Blackwood  Next Visit: None Scheduled  None         None Found                                                            Last  Test          Frequency    Reason                     Performed    Due Date  --------------------------------------------------------------------------------    CBC.........  12 months..  ibuprofen................  06- 06-    Cr..........  12 months..  ibuprofen................  06- 06-    [facility] [employer] Embedded Care Due Messages. Reference number: [id number].   7/12/2024 6:07:00 AM CDT

## 2024-07-12 NOTE — TELEPHONE ENCOUNTER
Refill Routing Note   Medication(s) are not appropriate for processing by [facility] for the following reason(s):        Required vitals abnormal    [facility] action(s):  Defer   Requires labs : Yes               Appointments  past 12m or future 3m with PCP    Date Provider   Last Visit   11/27/2023 Daniel Blackwood MD   Next Visit   Visit date not found Daniel Blackwood MD   ED visits in past 90 days: 0        Note composed:10:57 AM 07/12/2024

## 2024-08-16 NOTE — TELEPHONE ENCOUNTER
No care due was identified.  [facility] Embedded Care Due Messages. Reference number: [id number].   8/16/2024 6:06:34 AM CDT

## 2024-08-16 NOTE — TELEPHONE ENCOUNTER
Refill Routing Note   Medication(s) are not appropriate for processing by [facility] for the following reason(s):        Required vitals abnormal    [facility] action(s):  Defer             Appointments  past 12m or future 3m with PCP    Date Provider   Last Visit   11/27/2023 Daniel Blackwood MD   Next Visit   Visit date not found Daniel Blackwood MD   ED visits in past 90 days: 0        Note composed:3:21 PM 08/16/2024

## 2024-08-17 RX ORDER — AMLODIPINE BESYLATE 10 MG/1
10 TABLET ORAL
Qty: 90 TABLET | Refills: 1 | Status: SHIPPED | OUTPATIENT
Start: 2024-08-17

## 2024-09-09 ENCOUNTER — PATIENT MESSAGE (OUTPATIENT)
Dept: INTERNAL MEDICINE | Facility: CLINIC | Age: 54
End: 2024-09-09
Payer: COMMERCIAL

## 2024-12-30 ENCOUNTER — OFFICE VISIT (OUTPATIENT)
Dept: INTERNAL MEDICINE | Facility: CLINIC | Age: 54
End: 2024-12-30
Payer: COMMERCIAL

## 2024-12-30 ENCOUNTER — HOSPITAL ENCOUNTER (OUTPATIENT)
Dept: RADIOLOGY | Facility: HOSPITAL | Age: 54
Discharge: HOME | End: 2024-12-30
Attending: STUDENT IN AN ORGANIZED HEALTH CARE EDUCATION/TRAINING PROGRAM
Payer: COMMERCIAL

## 2024-12-30 VITALS
HEIGHT: 73 IN | SYSTOLIC BLOOD PRESSURE: 120 MMHG | WEIGHT: 204.13 LBS | TEMPERATURE: 97 F | OXYGEN SATURATION: 97 % | DIASTOLIC BLOOD PRESSURE: 80 MMHG | RESPIRATION RATE: 18 BRPM | BODY MASS INDEX: 27.05 KG/M2 | HEART RATE: 81 BPM

## 2024-12-30 DIAGNOSIS — J30.9 CHRONIC ALLERGIC RHINITIS: ICD-10-CM

## 2024-12-30 DIAGNOSIS — M54.16 LUMBAR RADICULOPATHY: ICD-10-CM

## 2024-12-30 DIAGNOSIS — I10 ESSENTIAL HYPERTENSION: Primary | ICD-10-CM

## 2024-12-30 DIAGNOSIS — Z00.00 ROUTINE ADULT HEALTH MAINTENANCE: ICD-10-CM

## 2024-12-30 DIAGNOSIS — Z13.1 DIABETES MELLITUS SCREENING: ICD-10-CM

## 2024-12-30 PROCEDURE — 99999 PR PBB SHADOW E&M-EST. PATIENT-LVL IV: CPT | Mod: PBBFAC,,, | Performed by: STUDENT IN AN ORGANIZED HEALTH CARE EDUCATION/TRAINING PROGRAM

## 2024-12-30 PROCEDURE — 1159F MED LIST DOCD IN RCRD: CPT | Mod: CPTII,S$GLB,, | Performed by: STUDENT IN AN ORGANIZED HEALTH CARE EDUCATION/TRAINING PROGRAM

## 2024-12-30 PROCEDURE — 3074F SYST BP LT 130 MM HG: CPT | Mod: CPTII,S$GLB,, | Performed by: STUDENT IN AN ORGANIZED HEALTH CARE EDUCATION/TRAINING PROGRAM

## 2024-12-30 PROCEDURE — 99214 OFFICE O/P EST MOD 30 MIN: CPT | Mod: S$GLB,,, | Performed by: STUDENT IN AN ORGANIZED HEALTH CARE EDUCATION/TRAINING PROGRAM

## 2024-12-30 PROCEDURE — G2211 COMPLEX E/M VISIT ADD ON: HCPCS | Mod: S$GLB,,, | Performed by: STUDENT IN AN ORGANIZED HEALTH CARE EDUCATION/TRAINING PROGRAM

## 2024-12-30 PROCEDURE — 70486 CT MAXILLOFACIAL W/O DYE: CPT | Mod: 26,,, | Performed by: RADIOLOGY

## 2024-12-30 PROCEDURE — 3079F DIAST BP 80-89 MM HG: CPT | Mod: CPTII,S$GLB,, | Performed by: STUDENT IN AN ORGANIZED HEALTH CARE EDUCATION/TRAINING PROGRAM

## 2024-12-30 PROCEDURE — 1160F RVW MEDS BY RX/DR IN RCRD: CPT | Mod: CPTII,S$GLB,, | Performed by: STUDENT IN AN ORGANIZED HEALTH CARE EDUCATION/TRAINING PROGRAM

## 2024-12-30 PROCEDURE — 3008F BODY MASS INDEX DOCD: CPT | Mod: CPTII,S$GLB,, | Performed by: STUDENT IN AN ORGANIZED HEALTH CARE EDUCATION/TRAINING PROGRAM

## 2024-12-30 PROCEDURE — 70486 CT MAXILLOFACIAL W/O DYE: CPT | Mod: TC,PO

## 2024-12-30 RX ORDER — CETIRIZINE HYDROCHLORIDE 10 MG/1
10 TABLET ORAL DAILY
Qty: 30 TABLET | Refills: 0 | Status: SHIPPED | OUTPATIENT
Start: 2024-12-30 | End: 2025-01-29

## 2024-12-30 NOTE — PROGRESS NOTES
Chief Complaint   Patient presents with    Annual Exam     HPI: Alessandro Olivares Jr. is a 54 y.o. male  with Pmhx listed below who presents to clinic for    History of Present Illness    CHIEF COMPLAINT:  - Mr. Olivares presents with persistent headaches occurring for 8-10 years, now occurring almost daily, along with recent episodes of vertigo.    HPI:  Mr. Olivares reports headaches for 8-10 years, increasing to almost daily frequency. The headaches are currently mild and persistent, localized to various parts of the head including bifrontal frontal and vertex areas. They can start at different times of the day, sometimes upon waking or midday, and last all day. Mr. Olivares rates current headache intensity as 1-2 out of 10, increasing to 3 when lowering his head.    Mr. Olivares has tried OTC medications and prescription ibuprofen for relief with variable effectiveness. Tylenol with caffeine is often effective for migraine relief. Sinus flushes have helped reduce headache intensity.    Associated symptoms include congestion, for which the patient uses prescribed Flonase. While Flonase helps with congestion, it does not significantly relieve headaches. Mr. Olivares reports occasional rhinorrhea and lacrimation, which improve with Flonase use. During more severe headache episodes, the patient has photophobia.    Mr. Olivares reports vertigo for the past 1.5 weeks, described as a mild sensation of the room spinning. He mentions past severe vertigo episodes where both he and the room felt like spinning, affecting his gait. These vertigo episodes are intermittent.    Mr. Olivares notes congestion and post-nasal drip, sometimes clear and sometimes green. Flonase and nasal spray use has affected his sense of smell, resulting in significant anosmia.    1.5 years ago, the patient was referred to an ENT doctor who recommended a CT scan, which was not done at that time. Mr. Olivares expresses a desire to get the scan done  now.    Mr. Olivares mentions a history of back problems, including a herniated disc years ago, which occasionally causes discomfort. He reports numbness in one leg for about 10 years.    Mr. Olivares denies nausea, vomiting, diaphoresis, or restlessness associated with headaches. He denies smoking and drinking alcohol. Mr. Olivares denies any numbness or tingling sensation in his hands.     Chart reviewed showed that he has been treated for both possible migraine and sinusitis in the past with antibiotics. He was sent to ENT where his last visit with them was on 06/26/2023 where ct sinuses was planned. He has not done it yet. He is willing to do it today.          Problem List:  Patient Active Problem List   Diagnosis    Chronic allergic rhinitis    Nasal polyps    Migraine without status migrainosus, not intractable    Essential hypertension    Lumbar radiculopathy    Plantar fasciitis    Elevated blood pressure reading       ROS: Negative except as noted above.       Current Meds:  Current Outpatient Medications   Medication Sig Dispense Refill    amLODIPine (NORVASC) 10 MG tablet Take 1 tablet by mouth once daily 90 tablet 1    fluticasone propionate (FLONASE) 50 mcg/actuation nasal spray 1 spray (50 mcg total) by Each Nostril route once daily. 16 g 0    cetirizine (ZYRTEC) 10 MG tablet Take 1 tablet (10 mg total) by mouth once daily. 30 tablet 0    ibuprofen (ADVIL,MOTRIN) 800 MG tablet Take 1 tablet (800 mg total) by mouth 2 (two) times a day. (Patient not taking: Reported on 12/30/2024) 30 tablet 0    ipratropium (ATROVENT) 21 mcg (0.03 %) nasal spray 2 sprays by Each Nostril route 3 (three) times daily. (Patient not taking: Reported on 12/30/2024) 12 mL 5     No current facility-administered medications for this visit.      PE:  BP: 120/80  Pulse: 81 Resp: 18   Temp: 97.1 °F (36.2 °C)  Weight: 92.6 kg (204 lb 2.3 oz) Body mass index is 26.93 kg/m².    Wt Readings from Last 5 Encounters:   12/30/24 92.6 kg (204  lb 2.3 oz)   11/27/23 92.1 kg (203 lb)   11/27/23 92.4 kg (203 lb 11.3 oz)   10/02/23 89.8 kg (198 lb)   06/19/23 89.9 kg (198 lb 3.1 oz)     General appearance: alert and cooperative, not in acute distress  Head: normocephalic, without obvious abnormality, atraumatic  Eyes: conjunctivae/corneas clear. PERRL, EOM's intact.  Ears: clear tympanic membranes   Neck: no adenopathy, supple, symmetrical, trachea midline and thyroid not enlarged, symmetric, no tenderness/mass/nodules, no JVD  Throat: lips, mucosa, and tongue normal; teeth and gums normal; no thrush  Chest: no reproducible chest pain   Heart: regular rate and rhythm, S1, S2 normal, no murmur, click, rub or gallop  Lungs: unlabored respiration, bilateral equal air entry, normal vesicular breath sound heard, no wheezing, rhonchi   Abdomen: soft, non-tender, non-distended; bowel sounds +; no masses,  no organomegaly, no ascites   Extremities: normal, atraumatic, no cyanosis or edema noted B/L upper and lower extremities.  Skin: skin color, texture, turgor normal. No rashes or lesions noted.  Neurologic: grossly intact      Lab:  Lab Results   Component Value Date    WBC 7.56 06/19/2023    HGB 15.0 06/19/2023    HCT 44.6 06/19/2023    MCV 87 06/19/2023     06/19/2023     06/19/2023    K 4.2 06/19/2023     06/19/2023    CO2 28 06/19/2023    BUN 18 06/19/2023     06/19/2023    CALCIUM 10.3 06/19/2023    AST 17 06/19/2023    ALT 19 06/19/2023    CHOL 261 (H) 06/19/2023    HDL 61 06/19/2023    LDLCALC 175.6 (H) 06/19/2023    TRIG 122 06/19/2023    TSH 1.478 06/19/2023    PSA 0.37 06/19/2023    INR 1.0 11/05/2020       Impression:    ICD-10-CM ICD-9-CM    1. Essential hypertension  I10 401.9 CBC W/ AUTO DIFFERENTIAL      COMPREHENSIVE METABOLIC PANEL      2. Diabetes mellitus screening  Z13.1 V77.1 HEMOGLOBIN A1C      3. Routine adult health maintenance  Z00.00 V70.0 LIPID PANEL      TESTOSTERONE      4. Chronic allergic rhinitis  J30.9  477.9 CT Medtronic Sinuses without      cetirizine (ZYRTEC) 10 MG tablet      5. Lumbar radiculopathy  M54.16 724.4           Assessment & Plan    HEADACHE (MIGRAINE AND ALLERGY-RELATED):  - Considering mixed picture of migraine and allergy-related headaches based on patient's symptoms and family history.  - Ordered CT to distinguish between migraine and allergy-related causes.  - Planning to review CT results before finalizing treatment approach.  - May consider migraine medication if symptoms persist after addressing allergies.    SEASONAL ALLERGIC RHINITIS:  - Explained connection between [place] environment and allergy symptoms.  - Discussed how swollen turbinates can lead to sinus congestion and postnasal drip.  - Mr. Olivares to continue using sinus flush to manage symptoms.  - Started Flonase daily for the next 2 weeks and started allergy pill.    NASAL CONGESTION:  - Started Flonase daily for the next 2 weeks and started allergy pill.  - Mr. Olivares to continue using sinus flush to manage symptoms.    FOLLOW-UP AND TESTS:  - Ordered CT of sinuses and lab work including cholesterol test.  - Contact the office for CT results by end of day or early tomorrow.  - Follow up next Monday for fasting lab work.          1. Essential hypertension (Primary)  Low salt diet.  Enouraged to increase in physical activity at least 30 minutes of brisk walking/day , 5 days a week  Advised weight loss    Advised for DASH diet - The Dietary Approaches to Stop Hypertension (DASH) is high in vegetables, fruits, low-fat dairy products, whole grains, poultry, fish, and nuts and low in sweets, sugar-sweetened beverages, and red meats   Stop smoking.  Limit caffeine intake  Limit alcohol intake <3/day for men and <2/day for women.  Advised to be compliant with medication.  Please monitor your blood pressure regularly at home   Return precaution provided    - CBC W/ AUTO DIFFERENTIAL; Future  - COMPREHENSIVE METABOLIC PANEL;  Future    2. Diabetes mellitus screening   Latest Reference Range & Units 06/01/15 13:18 11/22/16 14:14 06/19/23 09:17   Hemoglobin A1C External 4.0 - 5.6 % 5.8 5.7 5.5   Estimated Avg Glucose 68 - 131 mg/dL 120 117 111     - HEMOGLOBIN A1C; Future    3. Routine adult health maintenance  - LIPID PANEL; Future  - TESTOSTERONE; Future    4. Chronic allergic rhinitis  - CT Medtronic Sinuses without; Future  - cetirizine (ZYRTEC) 10 MG tablet; Take 1 tablet (10 mg total) by mouth once daily.  Dispense: 30 tablet; Refill: 0    5. Lumbar radiculopathy  Stable and asymptomatic today        Future Appointments   Date Time Provider Department Center   2/17/2025  2:00 PM Maria Victoria Rodriguez MD [facility] ALLER[facility]       I spent a total of 35 minutes on the day of the visit.This includes face to face time and non-face to face time preparing to see the patient (eg, review of tests), obtaining and/or reviewing separately obtained history, documenting clinical information in the electronic or other health record, independently interpreting results and communicating results to the patient/family/caregiver, or care coordinator.  Visit today included increased complexity associated with the care of the episodic problem   addressed and managing the longitudinal care of the patient due to the serious and/or complex managed problem(s) .     Ricky Melissa MD    This note was generated with the assistance of ambient listening technology. Verbal consent was obtained by the patient and accompanying visitor(s) for the recording of patient appointment to facilitate this note. I attest to having reviewed and edited the generated note for accuracy, though some syntax or spelling errors may persist. Please contact the author of this note for any clarification.

## 2024-12-31 DIAGNOSIS — J34.1 MAXILLARY SINUS CYST: Primary | ICD-10-CM

## 2024-12-31 RX ORDER — AMOXICILLIN AND CLAVULANATE POTASSIUM 875; 125 MG/1; MG/1
1 TABLET, FILM COATED ORAL 2 TIMES DAILY
Qty: 20 TABLET | Refills: 0 | Status: SHIPPED | OUTPATIENT
Start: 2024-12-31 | End: 2025-01-10

## 2025-01-13 ENCOUNTER — OFFICE VISIT (OUTPATIENT)
Dept: OTOLARYNGOLOGY | Facility: CLINIC | Age: 55
End: 2025-01-13
Payer: COMMERCIAL

## 2025-01-13 VITALS — BODY MASS INDEX: 27.14 KG/M2 | HEIGHT: 73 IN | WEIGHT: 204.81 LBS

## 2025-01-13 DIAGNOSIS — J34.1 MAXILLARY SINUS CYST: ICD-10-CM

## 2025-01-13 DIAGNOSIS — J32.4 CHRONIC PANSINUSITIS: Primary | ICD-10-CM

## 2025-01-13 DIAGNOSIS — Z98.890 HISTORY OF ENDOSCOPIC SINUS SURGERY: ICD-10-CM

## 2025-01-13 PROCEDURE — 31231 NASAL ENDOSCOPY DX: CPT | Mod: S$GLB,,, | Performed by: OTOLARYNGOLOGY

## 2025-01-13 PROCEDURE — 99214 OFFICE O/P EST MOD 30 MIN: CPT | Mod: 25,S$GLB,, | Performed by: OTOLARYNGOLOGY

## 2025-01-13 PROCEDURE — 99999 PR PBB SHADOW E&M-EST. PATIENT-LVL III: CPT | Mod: PBBFAC,,, | Performed by: OTOLARYNGOLOGY

## 2025-01-13 PROCEDURE — 3008F BODY MASS INDEX DOCD: CPT | Mod: CPTII,S$GLB,, | Performed by: OTOLARYNGOLOGY

## 2025-01-13 PROCEDURE — 1159F MED LIST DOCD IN RCRD: CPT | Mod: CPTII,S$GLB,, | Performed by: OTOLARYNGOLOGY

## 2025-01-13 NOTE — PROGRESS NOTES
"Referring Provider:    Ricky Melissa Md  [address] [place] [id number]  Subjective:   Patient: Alessandro Olivares Jr. [id number], :1970   Visit date:2025 11:54 AM    Chief Complaint:  sinus cyst (Pt states for months he has had chronic headaches and congestion. States has a headache now)    HPI:    Prior notes reviewed by myself.  Clinical documentation obtained by nursing staff reviewed.      54-year-old gentleman presents for evaluation of chronic headaches and sinusitis.  He reports frequent bitemporal headaches that happen randomly and are not associated with light or sound sensitivity.  He cannot identify any obvious triggers, but he does state that his recent antibiotic treatment made his headaches better.  He had a recent CT sinus, results are below.  He has  history of prior FESS performed years ago for chronic sinusitis.        Objective:     Physical Exam:  Vitals:  Ht 6' 1" (1.854 m)   Wt 92.9 kg (204 lb 12.9 oz)   BMI 27.02 kg/m²   General appearance:  Well developed, well nourished    Ears:  Otoscopy of external auditory canals and tympanic membranes was normal, clinical speech reception thresholds grossly intact, no mass/lesion of auricle.    Nose:  No masses/lesions of external nose, nasal mucosa, septum, and turbinates were within normal limits.    Mouth:  No mass/lesion of lips, teeth, gums, hard/soft palate, tongue, tonsils, or oropharynx.    Neck & Lymphatics:  No cervical lymphadenopathy, no neck mass/crepitus/ asymmetry, trachea is midline, no thyroid enlargement/tenderness/mass.    PROCEDURE NOTE:  Diagnostic nasal endoscopy  Preprocedure diagnosis:  Chronic sinusitis  Postprocedure diangosis:  Same  Complications:  None  Blood Loss:  None    Procedure in detail:  After verbal consent was obtained, the patient's nasal cavity was anesthesized using topical Tetracaine and Neosynepherine.  A rigid 30 degree endoscope was placed in first the right, then the left nasal cavity. "  The inferior and middle turbinates were examined, and found to be normal bilaterally.  The middle meatus and maxillary antrum was also examined, and found to  have postsurgical changes with patent sinusotomies bilaterally.  On the right side, he has what appears to be a residual ethmoid air cell/ethmoid bulla consistent with the CT findings. The superior meatus as well as the sphenoethmoid recess were also inspected and noted to be free of purulent drainage, masses or other pathology. The patient tolerated the procedure well and there were no complications.      [x]  Data Reviewed:    Lab Results   Component Value Date    WBC 7.56 06/19/2023    HGB 15.0 06/19/2023    HCT 44.6 06/19/2023    MCV 87 06/19/2023    EOSINOPHIL 3.0 06/19/2023         [x]  Independent interpretation of test: Ethmoid Bulla? Opacified right side3  CT Medtronic Sinuses without  Order: [id number]  Status: Final result       Visible to patient: Yes (seen)       Next appt: 02/17/2025 at 02:00 PM in Allergy (Maria Victoria Rodriguez MD)       Dx: Chronic allergic rhinitis    2 Result Notes       1 Patient Communication  Details    Reading Physician Reading Date Result Priority   Miko Gauthier MD  [phone] 12/30/2024      Narrative & Impression     EXAM:  CT MEDTRONIC SINUSES WITHOUT, multiplanar reconstructions     CLINICAL HISTORY:  Allergic rhinitis     TECHNIQUE: Axial images through the facial bones were obtained without  the use of IV contrast.  Sagittal and coronal reconstructions are provided for review.     FINDINGS: No comparison studies are available.  Negative for facial or orbital fractures.     There are postoperative changes to the medial maxillary sinus walls and most of the ethmoid air cells.  There is mild bilateral maxillary sinus periosteal thickening.  There is a soft tissue area in the superior right nares region measuring 2.3 cm, which appears to be a mucus retention cyst in the region of the right ostiomeatal complex versus a  possible nasal polyp.  The paranasal sinuses are otherwise clear.  The visualized portions of the mastoid air cells, middle ears and ear canals are clear.     The orbits and globes are intact. The intraorbital structures are unremarkable.     Visualized portions of the brain and posterior fossa are without focal abnormality.     The airways are patent. Surrounding soft tissues are normal.        Impression:     1.   Negative for acute process involving the facial bones or orbits.  2.  Postoperative changes to the paranasal sinuses as detailed above.  3.  Bilateral maxillary sinus mucosal periosteal thickening.  Probable large mucus retention cyst in the right posterior ethmoid air cell versus retention cyst in the region of the right ostiomeatal complex, versus nasal polyp.  4.  Nonemergent findings as described above.        All CT scans at [this location] are performed using dose modulation techniques as appropriate to a performed exam including the following: automated exposure control; adjustment of the mA and/or kV according to patient size (this includes techniques or standardized protocols for targeted exams where dose is matched to indication / reason for exam; i.e. extremities or head); use of iterative reconstruction technique.     Finalized on: 12/30/2024 9:55 PM By:  Miko Gauthier MD  [facility]# [id number]      2024-12-30 21:58:01.361    [facility]             Assessment & Plan:   Chronic pansinusitis    Maxillary sinus cyst  -     Ambulatory referral/consult to ENT    History of endoscopic sinus surgery        We discussed options including FESS (revision) and associated risks, as noted below.  WE also discussed the option for medicated irrigations.  We agreed to try the medicated irrigations for the next 4-6 weeks and then re-evaluate.    We have reviewed the patient's history, physical exam, endoscopy findings and CT sinus images in detail.  The patient is noted to have evidence of chronic sinusitis as well as  turbinate hypertrophy.  We reviewed the risks, benefits and alternatives to the procedure of functional endoscopic sinus surgery including but not limited to:  Pain, bleeding, scarring, need for further procedures, empty nose syndrome, injury to the tear duct possibly requiring additional surgery, injury to the eye or eye socket resulting in changes in vision or blindness, changes in sense of smell and/or taste, cerebrospinal fluid leak, persistent nasal obstruction, nasal septal perforation possibly requiring additional surgery.    Martín Brand M.D.  Department of Otolaryngology - Head & Neck Surgery  [address] [facility] [place] [address]  P: [phone]  F: [phone]        DISCLAIMER: This note was prepared with [employer] voice recognition transcription software. Garbled syntax, mangled pronouns, and other bizarre constructions may be attributed to that software system. While efforts were made to correct any mistakes made by this voice recognition program, some errors and/or omissions may remain in the note that were missed when the note was originally created.

## 2025-01-13 NOTE — Clinical Note
Hey - would y'all mind taking a quick peek at this guys CT sinus.  On exam it looks like they just didn't open up his right ethmoid bulla and it's now opacified. Let me know if you agree.  DH

## 2025-01-15 ENCOUNTER — PATIENT MESSAGE (OUTPATIENT)
Dept: OTOLARYNGOLOGY | Facility: CLINIC | Age: 55
End: 2025-01-15
Payer: COMMERCIAL

## 2025-01-15 ENCOUNTER — TELEPHONE (OUTPATIENT)
Dept: OTOLARYNGOLOGY | Facility: CLINIC | Age: 55
End: 2025-01-15
Payer: COMMERCIAL

## 2025-01-15 NOTE — TELEPHONE ENCOUNTER
Rx for nasal irrigation system with Mupirocin 2% and Budesonide 1mg/2ml with 3rf faxed to [phone]. Conf rcvd

## 2025-02-24 ENCOUNTER — OFFICE VISIT (OUTPATIENT)
Dept: OTOLARYNGOLOGY | Facility: CLINIC | Age: 55
End: 2025-02-24
Payer: COMMERCIAL

## 2025-02-24 VITALS — HEIGHT: 73 IN | BODY MASS INDEX: 27.14 KG/M2 | WEIGHT: 204.81 LBS

## 2025-02-24 DIAGNOSIS — Z98.890 HISTORY OF ENDOSCOPIC SINUS SURGERY: ICD-10-CM

## 2025-02-24 DIAGNOSIS — J33.9 NASAL POLYPS: ICD-10-CM

## 2025-02-24 DIAGNOSIS — J32.4 CHRONIC PANSINUSITIS: Primary | ICD-10-CM

## 2025-02-24 DIAGNOSIS — G44.209 TENSION HEADACHE: ICD-10-CM

## 2025-02-24 PROCEDURE — 1160F RVW MEDS BY RX/DR IN RCRD: CPT | Mod: CPTII,S$GLB,, | Performed by: OTOLARYNGOLOGY

## 2025-02-24 PROCEDURE — 3008F BODY MASS INDEX DOCD: CPT | Mod: CPTII,S$GLB,, | Performed by: OTOLARYNGOLOGY

## 2025-02-24 PROCEDURE — 99999 PR PBB SHADOW E&M-EST. PATIENT-LVL III: CPT | Mod: PBBFAC,,, | Performed by: OTOLARYNGOLOGY

## 2025-02-24 PROCEDURE — 1159F MED LIST DOCD IN RCRD: CPT | Mod: CPTII,S$GLB,, | Performed by: OTOLARYNGOLOGY

## 2025-02-24 PROCEDURE — 99214 OFFICE O/P EST MOD 30 MIN: CPT | Mod: S$GLB,,, | Performed by: OTOLARYNGOLOGY

## 2025-02-24 NOTE — PROGRESS NOTES
"Referring Provider:    No referring provider defined for this encounter.  Subjective:   Patient: Alessandro Olivares Jr. [id number], :1970   Visit date:2025 11:54 AM    Chief Complaint:  Follow-up (Pt  states he is 75 % better only problem is at night )    HPI:    Prior notes reviewed by myself.  Clinical documentation obtained by nursing staff reviewed.      54-year-old gentleman presents for evaluation of chronic headaches and sinusitis.  He reports frequent bitemporal headaches that happen randomly and are not associated with light or sound sensitivity.  He cannot identify any obvious triggers, but he does state that his recent antibiotic treatment made his headaches better.  He had a recent CT sinus, results are below.  He has  history of prior FESS performed years ago for chronic sinusitis.      25 update:    He feels like his headaches are still present but 75-80% improvement.  He is still using his medicated irrigations.      Objective:     Physical Exam:  Vitals:  Ht 6' 1" (1.854 m)   Wt 92.9 kg (204 lb 12.9 oz)   BMI 27.02 kg/m²   General appearance:  Well developed, well nourished    Ears:  Otoscopy of external auditory canals and tympanic membranes was normal, clinical speech reception thresholds grossly intact, no mass/lesion of auricle.    Nose:  No masses/lesions of external nose, nasal mucosa, septum, and turbinates were within normal limits.    Mouth:  No mass/lesion of lips, teeth, gums, hard/soft palate, tongue, tonsils, or oropharynx.    Neck & Lymphatics:  No cervical lymphadenopathy, no neck mass/crepitus/ asymmetry, trachea is midline, no thyroid enlargement/tenderness/mass.        [x]  Data Reviewed:    Lab Results   Component Value Date    WBC 7.56 2023    HGB 15.0 2023    HCT 44.6 2023    MCV 87 2023    EOSINOPHIL 3.0 2023         [x]  Independent interpretation of test: Ethmoid Bulla? Opacified right side3  CT Medtronic Sinuses without  Order: " [id number]  Status: Final result       Visible to patient: Yes (seen)       Next appt: 02/17/2025 at 02:00 PM in Allergy (Maria Victoria Rodriguez MD)       Dx: Chronic allergic rhinitis    2 Result Notes       1 Patient Communication  Details    Reading Physician Reading Date Result Priority   Miko Gauthier MD  [phone] 12/30/2024      Narrative & Impression     EXAM:  CT MEDTRONIC SINUSES WITHOUT, multiplanar reconstructions     CLINICAL HISTORY:  Allergic rhinitis     TECHNIQUE: Axial images through the facial bones were obtained without  the use of IV contrast.  Sagittal and coronal reconstructions are provided for review.     FINDINGS: No comparison studies are available.  Negative for facial or orbital fractures.     There are postoperative changes to the medial maxillary sinus walls and most of the ethmoid air cells.  There is mild bilateral maxillary sinus periosteal thickening.  There is a soft tissue area in the superior right nares region measuring 2.3 cm, which appears to be a mucus retention cyst in the region of the right ostiomeatal complex versus a possible nasal polyp.  The paranasal sinuses are otherwise clear.  The visualized portions of the mastoid air cells, middle ears and ear canals are clear.     The orbits and globes are intact. The intraorbital structures are unremarkable.     Visualized portions of the brain and posterior fossa are without focal abnormality.     The airways are patent. Surrounding soft tissues are normal.        Impression:     1.   Negative for acute process involving the facial bones or orbits.  2.  Postoperative changes to the paranasal sinuses as detailed above.  3.  Bilateral maxillary sinus mucosal periosteal thickening.  Probable large mucus retention cyst in the right posterior ethmoid air cell versus retention cyst in the region of the right ostiomeatal complex, versus nasal polyp.  4.  Nonemergent findings as described above.        All CT scans at [this location] are  performed using dose modulation techniques as appropriate to a performed exam including the following: automated exposure control; adjustment of the mA and/or kV according to patient size (this includes techniques or standardized protocols for targeted exams where dose is matched to indication / reason for exam; i.e. extremities or head); use of iterative reconstruction technique.     Finalized on: 12/30/2024 9:55 PM By:  Miko Gauthier MD  R# [id number]      2024-12-30 21:58:01.361    [facility]             Assessment & Plan:   Chronic pansinusitis    History of endoscopic sinus surgery    Nasal polyps    Tension headache          We discussed options including FESS (revision) and associated risks, as noted below.  WE also discussed the option for medicated irrigations.  We agreed to try the medicated irrigations for the next 4-6 weeks and then re-evaluate.    We have reviewed the patient's history, physical exam, endoscopy findings and CT sinus images in detail.  The patient is noted to have evidence of chronic sinusitis as well as turbinate hypertrophy.  We reviewed the risks, benefits and alternatives to the procedure of functional endoscopic sinus surgery including but not limited to:  Pain, bleeding, scarring, need for further procedures, empty nose syndrome, injury to the tear duct possibly requiring additional surgery, injury to the eye or eye socket resulting in changes in vision or blindness, changes in sense of smell and/or taste, cerebrospinal fluid leak, persistent nasal obstruction, nasal septal perforation possibly requiring additional surgery.    2/24/25 update:    His symptoms are most consistent with tension headaches and I feel like the opacified right ethmoid air cell may not be related to his symptoms.  That being said, he has noticed improvement with the medicated sinus irrigations.  We once again discussed the option for endoscopic sinus surgery with drainage of this air cell but agreed to hold off on  that for now since he is improving so much.    Martín Brand M.D.  Department of Otolaryngology - Head & Neck Surgery  [address] [facility].  [place] [address]  P: [phone]  F: [phone]        DISCLAIMER: This note was prepared with [employer] voice recognition transcription software. Garbled syntax, mangled pronouns, and other bizarre constructions may be attributed to that software system. While efforts were made to correct any mistakes made by this voice recognition program, some errors and/or omissions may remain in the note that were missed when the note was originally created.

## 2025-04-15 DIAGNOSIS — I10 ESSENTIAL HYPERTENSION: Primary | ICD-10-CM

## 2025-04-15 RX ORDER — AMLODIPINE BESYLATE 10 MG/1
10 TABLET ORAL
Qty: 90 TABLET | Refills: 0 | Status: SHIPPED | OUTPATIENT
Start: 2025-04-15

## 2025-04-15 NOTE — TELEPHONE ENCOUNTER
Refill Routing Note   Medication(s) are not appropriate for processing by [facility] for the following reason(s):        No active prescription written by provider    [facility] action(s):  Defer   Requires labs : Yes             Appointments  past 12m or future 3m with PCP    Date Provider   Last Visit   12/30/2024 Ricky Melissa MD   Next Visit   Visit date not found Ricky Melissa MD   ED visits in past 90 days: 0        Note composed:8:10 AM 04/15/2025

## 2025-04-15 NOTE — TELEPHONE ENCOUNTER
Care Due:                  Date            Visit Type   Department     Provider  --------------------------------------------------------------------------------                                MYCHART                              ANNUAL                              CHECKUP/PHY  [facility] INTERNAL  Last Visit: 12-      [facility]       Ricky Melissa  Next Visit: None Scheduled  None         None Found                                                            Last  Test          Frequency    Reason                     Performed    Due Date  --------------------------------------------------------------------------------    CBC.........  12 months..  ibuprofen................  06- 06-    Cr..........  12 months..  ibuprofen................  06- 06-    [employer] Embedded Care Due Messages. Reference number: [id number].   4/15/2025 6:07:23 AM CDT

## 2025-05-30 ENCOUNTER — PATIENT MESSAGE (OUTPATIENT)
Dept: OTOLARYNGOLOGY | Facility: CLINIC | Age: 55
End: 2025-05-30
Payer: COMMERCIAL

## 2025-05-30 DIAGNOSIS — J30.9 CHRONIC ALLERGIC RHINITIS: ICD-10-CM

## 2025-05-30 NOTE — TELEPHONE ENCOUNTER
He can definitely have an earlier appt.  Why don't you have Tom give him a call and see which medication he is talking about.

## 2025-05-30 NOTE — TELEPHONE ENCOUNTER
Spoke to patient. Requesting refills for medicated irrigations from [facility]. Called [facility] to ensure he had refills on the prescription, he does. They will call him & send prescription.

## 2025-06-01 RX ORDER — CETIRIZINE HYDROCHLORIDE 10 MG/1
10 TABLET ORAL DAILY
Qty: 30 TABLET | Refills: 2 | Status: SHIPPED | OUTPATIENT
Start: 2025-06-01 | End: 2025-08-30

## 2025-07-14 ENCOUNTER — OFFICE VISIT (OUTPATIENT)
Dept: OTOLARYNGOLOGY | Facility: CLINIC | Age: 55
End: 2025-07-14
Payer: COMMERCIAL

## 2025-07-14 VITALS — WEIGHT: 202.63 LBS | BODY MASS INDEX: 26.73 KG/M2

## 2025-07-14 DIAGNOSIS — J33.9 NASAL POLYPS: ICD-10-CM

## 2025-07-14 DIAGNOSIS — G44.209 TENSION HEADACHE: Primary | ICD-10-CM

## 2025-07-14 DIAGNOSIS — Z98.890 HISTORY OF ENDOSCOPIC SINUS SURGERY: ICD-10-CM

## 2025-07-14 DIAGNOSIS — J32.4 CHRONIC PANSINUSITIS: ICD-10-CM

## 2025-07-14 PROCEDURE — 3008F BODY MASS INDEX DOCD: CPT | Mod: CPTII,S$GLB,, | Performed by: OTOLARYNGOLOGY

## 2025-07-14 PROCEDURE — 1160F RVW MEDS BY RX/DR IN RCRD: CPT | Mod: CPTII,S$GLB,, | Performed by: OTOLARYNGOLOGY

## 2025-07-14 PROCEDURE — 99214 OFFICE O/P EST MOD 30 MIN: CPT | Mod: S$GLB,,, | Performed by: OTOLARYNGOLOGY

## 2025-07-14 PROCEDURE — 99999 PR PBB SHADOW E&M-EST. PATIENT-LVL III: CPT | Mod: PBBFAC,,, | Performed by: OTOLARYNGOLOGY

## 2025-07-14 PROCEDURE — 1159F MED LIST DOCD IN RCRD: CPT | Mod: CPTII,S$GLB,, | Performed by: OTOLARYNGOLOGY

## 2025-07-14 NOTE — PROGRESS NOTES
Referring Provider:    Self, Aaareferral  No address on file  Subjective:   Patient: Alessandro Olivares Jr. [id number], :1970   Visit date:2025 11:54 AM    Chief Complaint:  Follow-up (T is coming in today for a follow up for headaches pt states that he is better but he noticed that when he stopped taking the medicine that the headaches started again)    HPI:    Prior notes reviewed by myself.  Clinical documentation obtained by nursing staff reviewed.      54-year-old gentleman presents for evaluation of chronic headaches and sinusitis.  He reports frequent bitemporal headaches that happen randomly and are not associated with light or sound sensitivity.  He cannot identify any obvious triggers, but he does state that his recent antibiotic treatment made his headaches better.  He had a recent CT sinus, results are below.  He has  history of prior FESS performed years ago for chronic sinusitis.      25 update:    He feels like his headaches are still present but 75-80% improvement.  He is still using his medicated irrigations.    25 update:    He continues to have intermittent headaches.  He localizes them to different areas in his head and face.  He feels like they happen more in the mid to late morning.  He does feel like the medicated irrigations have  helped.  He reports occasional light and sound sensitivity during headaches.  He denies any green or yellow drainage, fever, nasal congestion or hyposmia.      Objective:     Physical Exam:  Vitals:  Wt 91.9 kg (202 lb 9.6 oz)   BMI 26.73 kg/m²   General appearance:  Well developed, well nourished    Ears:  Otoscopy of external auditory canals and tympanic membranes was normal, clinical speech reception thresholds grossly intact, no mass/lesion of auricle.    Nose:  No masses/lesions of external nose, nasal mucosa, septum, and turbinates were within normal limits.    Mouth:  No mass/lesion of lips, teeth, gums, hard/soft palate, tongue, tonsils,  or oropharynx.    Neck & Lymphatics:  No cervical lymphadenopathy, no neck mass/crepitus/ asymmetry, trachea is midline, no thyroid enlargement/tenderness/mass.        [x]  Data Reviewed:    Lab Results   Component Value Date    WBC 7.56 06/19/2023    HGB 15.0 06/19/2023    HCT 44.6 06/19/2023    MCV 87 06/19/2023    EOSINOPHIL 3.0 06/19/2023         [x]  Independent interpretation of test: Ethmoid Bulla? Opacified right side  CT Medtronic Sinuses without  Order: [id number]  Status: Final result       Visible to patient: Yes (seen)       Next appt: 02/17/2025 at 02:00 PM in Allergy (Maria Victoria Rodriguez MD)       Dx: Chronic allergic rhinitis    2 Result Notes       1 Patient Communication  Details    Reading Physician Reading Date Result Priority   Miko Gauthier MD  [phone] 12/30/2024      Narrative & Impression     EXAM:  CT MEDTRONIC SINUSES WITHOUT, multiplanar reconstructions     CLINICAL HISTORY:  Allergic rhinitis     TECHNIQUE: Axial images through the facial bones were obtained without  the use of IV contrast.  Sagittal and coronal reconstructions are provided for review.     FINDINGS: No comparison studies are available.  Negative for facial or orbital fractures.     There are postoperative changes to the medial maxillary sinus walls and most of the ethmoid air cells.  There is mild bilateral maxillary sinus periosteal thickening.  There is a soft tissue area in the superior right nares region measuring 2.3 cm, which appears to be a mucus retention cyst in the region of the right ostiomeatal complex versus a possible nasal polyp.  The paranasal sinuses are otherwise clear.  The visualized portions of the mastoid air cells, middle ears and ear canals are clear.     The orbits and globes are intact. The intraorbital structures are unremarkable.     Visualized portions of the brain and posterior fossa are without focal abnormality.     The airways are patent. Surrounding soft tissues are normal.         Impression:     1.   Negative for acute process involving the facial bones or orbits.  2.  Postoperative changes to the paranasal sinuses as detailed above.  3.  Bilateral maxillary sinus mucosal periosteal thickening.  Probable large mucus retention cyst in the right posterior ethmoid air cell versus retention cyst in the region of the right ostiomeatal complex, versus nasal polyp.  4.  Nonemergent findings as described above.        All CT scans at [this location] are performed using dose modulation techniques as appropriate to a performed exam including the following: automated exposure control; adjustment of the mA and/or kV according to patient size (this includes techniques or standardized protocols for targeted exams where dose is matched to indication / reason for exam; i.e. extremities or head); use of iterative reconstruction technique.     Finalized on: 12/30/2024 9:55 PM By:  Miko Gauthier MD  [facility]# [id number]      2024-12-30 21:58:01.361    [facility]             Assessment & Plan:   Tension headache  -     Ambulatory referral/consult to Neurology; Future; Expected date: 07/21/2025    History of endoscopic sinus surgery    Nasal polyps    Chronic pansinusitis            We discussed options including FESS (revision) and associated risks, as noted below.  WE also discussed the option for medicated irrigations.  We agreed to try the medicated irrigations for the next 4-6 weeks and then re-evaluate.    We have reviewed the patient's history, physical exam, endoscopy findings and CT sinus images in detail.  The patient is noted to have evidence of chronic sinusitis as well as turbinate hypertrophy.  We reviewed the risks, benefits and alternatives to the procedure of functional endoscopic sinus surgery including but not limited to:  Pain, bleeding, scarring, need for further procedures, empty nose syndrome, injury to the tear duct possibly requiring additional surgery, injury to the eye or eye socket resulting in  changes in vision or blindness, changes in sense of smell and/or taste, cerebrospinal fluid leak, persistent nasal obstruction, nasal septal perforation possibly requiring additional surgery.    2/24/25 update:    His symptoms are most consistent with tension headaches and I feel like the opacified right ethmoid air cell may not be related to his symptoms.  That being said, he has noticed improvement with the medicated sinus irrigations.  We once again discussed the option for endoscopic sinus surgery with drainage of this air cell but agreed to hold off on that for now since he is improving so much.    7/15/25 update:    We reviewed his CT images and previous history.  I still suspect that his headaches may not be secondary to his sinusitis.  He does have an opacified ethmoid air cell on the right side, but I am not convinced that addressing this will resolve his headaches.  We agreed on a neurology evaluation prior to making a decision on further sinus surgery.  He will follow-up in 3 months    Martín Brand M.D.  Department of Otolaryngology - Head & Neck Surgery  [address] [facility] [place] [address]  P: [phone]  F: [phone]        DISCLAIMER: This note was prepared with [employer] voice recognition transcription software. Garbled syntax, mangled pronouns, and other bizarre constructions may be attributed to that software system. While efforts were made to correct any mistakes made by this voice recognition program, some errors and/or omissions may remain in the note that were missed when the note was originally created.

## 2025-07-28 ENCOUNTER — PATIENT MESSAGE (OUTPATIENT)
Dept: OTOLARYNGOLOGY | Facility: CLINIC | Age: 55
End: 2025-07-28
Payer: COMMERCIAL

## 2025-08-13 ENCOUNTER — PATIENT MESSAGE (OUTPATIENT)
Dept: OTOLARYNGOLOGY | Facility: CLINIC | Age: 55
End: 2025-08-13
Payer: COMMERCIAL

## 2025-09-04 DIAGNOSIS — J30.9 CHRONIC ALLERGIC RHINITIS: ICD-10-CM

## 2025-09-04 RX ORDER — CETIRIZINE HYDROCHLORIDE 10 MG/1
10 TABLET ORAL
Qty: 30 TABLET | Refills: 0 | Status: SHIPPED | OUTPATIENT
Start: 2025-09-04

## 2025-09-05 DIAGNOSIS — I10 ESSENTIAL HYPERTENSION: ICD-10-CM

## 2025-09-05 RX ORDER — AMLODIPINE BESYLATE 10 MG/1
10 TABLET ORAL
Qty: 90 TABLET | Refills: 0 | Status: SHIPPED | OUTPATIENT
Start: 2025-09-05